# Patient Record
Sex: MALE | Race: WHITE | NOT HISPANIC OR LATINO | URBAN - METROPOLITAN AREA
[De-identification: names, ages, dates, MRNs, and addresses within clinical notes are randomized per-mention and may not be internally consistent; named-entity substitution may affect disease eponyms.]

---

## 2019-04-27 ENCOUNTER — EMERGENCY (EMERGENCY)
Facility: HOSPITAL | Age: 34
LOS: 1 days | Discharge: HOME | End: 2019-04-27
Attending: EMERGENCY MEDICINE
Payer: COMMERCIAL

## 2019-04-27 VITALS
DIASTOLIC BLOOD PRESSURE: 80 MMHG | HEART RATE: 67 BPM | TEMPERATURE: 98 F | OXYGEN SATURATION: 100 % | HEIGHT: 67 IN | WEIGHT: 151.9 LBS | RESPIRATION RATE: 18 BRPM | SYSTOLIC BLOOD PRESSURE: 125 MMHG

## 2019-04-27 LAB
ALBUMIN SERPL ELPH-MCNC: 4.1 G/DL — SIGNIFICANT CHANGE UP (ref 3.5–5)
ALP SERPL-CCNC: 64 U/L — SIGNIFICANT CHANGE UP (ref 40–120)
ALT FLD-CCNC: 27 U/L DA — SIGNIFICANT CHANGE UP (ref 10–60)
ANION GAP SERPL CALC-SCNC: 5 MMOL/L — SIGNIFICANT CHANGE UP (ref 5–17)
APPEARANCE UR: CLEAR — SIGNIFICANT CHANGE UP
APTT BLD: 33.4 SEC — SIGNIFICANT CHANGE UP (ref 27.5–36.3)
AST SERPL-CCNC: 21 U/L — SIGNIFICANT CHANGE UP (ref 10–40)
BASOPHILS # BLD AUTO: 0.08 K/UL — SIGNIFICANT CHANGE UP (ref 0–0.2)
BASOPHILS NFR BLD AUTO: 0.7 % — SIGNIFICANT CHANGE UP (ref 0–2)
BILIRUB SERPL-MCNC: 0.7 MG/DL — SIGNIFICANT CHANGE UP (ref 0.2–1.2)
BILIRUB UR-MCNC: NEGATIVE — SIGNIFICANT CHANGE UP
BLD GP AB SCN SERPL QL: SIGNIFICANT CHANGE UP
BUN SERPL-MCNC: 12 MG/DL — SIGNIFICANT CHANGE UP (ref 7–18)
CALCIUM SERPL-MCNC: 9.1 MG/DL — SIGNIFICANT CHANGE UP (ref 8.4–10.5)
CHLORIDE SERPL-SCNC: 104 MMOL/L — SIGNIFICANT CHANGE UP (ref 96–108)
CO2 SERPL-SCNC: 29 MMOL/L — SIGNIFICANT CHANGE UP (ref 22–31)
COLOR SPEC: YELLOW — SIGNIFICANT CHANGE UP
CREAT SERPL-MCNC: 0.74 MG/DL — SIGNIFICANT CHANGE UP (ref 0.5–1.3)
DIFF PNL FLD: NEGATIVE — SIGNIFICANT CHANGE UP
EOSINOPHIL # BLD AUTO: 0.08 K/UL — SIGNIFICANT CHANGE UP (ref 0–0.5)
EOSINOPHIL NFR BLD AUTO: 0.7 % — SIGNIFICANT CHANGE UP (ref 0–6)
GLUCOSE SERPL-MCNC: 97 MG/DL — SIGNIFICANT CHANGE UP (ref 70–99)
GLUCOSE UR QL: NEGATIVE — SIGNIFICANT CHANGE UP
HCT VFR BLD CALC: 44.3 % — SIGNIFICANT CHANGE UP (ref 39–50)
HGB BLD-MCNC: 15 G/DL — SIGNIFICANT CHANGE UP (ref 13–17)
IMM GRANULOCYTES NFR BLD AUTO: 0.3 % — SIGNIFICANT CHANGE UP (ref 0–1.5)
KETONES UR-MCNC: NEGATIVE — SIGNIFICANT CHANGE UP
LEUKOCYTE ESTERASE UR-ACNC: NEGATIVE — SIGNIFICANT CHANGE UP
LYMPHOCYTES # BLD AUTO: 2.82 K/UL — SIGNIFICANT CHANGE UP (ref 1–3.3)
LYMPHOCYTES # BLD AUTO: 24.1 % — SIGNIFICANT CHANGE UP (ref 13–44)
MCHC RBC-ENTMCNC: 31.4 PG — SIGNIFICANT CHANGE UP (ref 27–34)
MCHC RBC-ENTMCNC: 33.9 GM/DL — SIGNIFICANT CHANGE UP (ref 32–36)
MCV RBC AUTO: 92.9 FL — SIGNIFICANT CHANGE UP (ref 80–100)
MONOCYTES # BLD AUTO: 1.42 K/UL — HIGH (ref 0–0.9)
MONOCYTES NFR BLD AUTO: 12.2 % — SIGNIFICANT CHANGE UP (ref 2–14)
NEUTROPHILS # BLD AUTO: 7.25 K/UL — SIGNIFICANT CHANGE UP (ref 1.8–7.4)
NEUTROPHILS NFR BLD AUTO: 62 % — SIGNIFICANT CHANGE UP (ref 43–77)
NITRITE UR-MCNC: NEGATIVE — SIGNIFICANT CHANGE UP
NRBC # BLD: 0 /100 WBCS — SIGNIFICANT CHANGE UP (ref 0–0)
PH UR: 7 — SIGNIFICANT CHANGE UP (ref 5–8)
PLATELET # BLD AUTO: 269 K/UL — SIGNIFICANT CHANGE UP (ref 150–400)
POTASSIUM SERPL-MCNC: 4.3 MMOL/L — SIGNIFICANT CHANGE UP (ref 3.5–5.3)
POTASSIUM SERPL-SCNC: 4.3 MMOL/L — SIGNIFICANT CHANGE UP (ref 3.5–5.3)
PROT SERPL-MCNC: 7.8 G/DL — SIGNIFICANT CHANGE UP (ref 6–8.3)
PROT UR-MCNC: NEGATIVE — SIGNIFICANT CHANGE UP
RBC # BLD: 4.77 M/UL — SIGNIFICANT CHANGE UP (ref 4.2–5.8)
RBC # FLD: 11.3 % — SIGNIFICANT CHANGE UP (ref 10.3–14.5)
SODIUM SERPL-SCNC: 138 MMOL/L — SIGNIFICANT CHANGE UP (ref 135–145)
SP GR SPEC: 1.01 — SIGNIFICANT CHANGE UP (ref 1.01–1.02)
UROBILINOGEN FLD QL: NEGATIVE — SIGNIFICANT CHANGE UP
WBC # BLD: 11.68 K/UL — HIGH (ref 3.8–10.5)
WBC # FLD AUTO: 11.68 K/UL — HIGH (ref 3.8–10.5)

## 2019-04-27 PROCEDURE — 74177 CT ABD & PELVIS W/CONTRAST: CPT

## 2019-04-27 PROCEDURE — 36415 COLL VENOUS BLD VENIPUNCTURE: CPT

## 2019-04-27 PROCEDURE — 85730 THROMBOPLASTIN TIME PARTIAL: CPT

## 2019-04-27 PROCEDURE — 86901 BLOOD TYPING SEROLOGIC RH(D): CPT

## 2019-04-27 PROCEDURE — 74177 CT ABD & PELVIS W/CONTRAST: CPT | Mod: 26

## 2019-04-27 PROCEDURE — 96374 THER/PROPH/DIAG INJ IV PUSH: CPT | Mod: XU

## 2019-04-27 PROCEDURE — 99285 EMERGENCY DEPT VISIT HI MDM: CPT

## 2019-04-27 PROCEDURE — 85027 COMPLETE CBC AUTOMATED: CPT

## 2019-04-27 PROCEDURE — 81003 URINALYSIS AUTO W/O SCOPE: CPT

## 2019-04-27 PROCEDURE — 86900 BLOOD TYPING SEROLOGIC ABO: CPT

## 2019-04-27 PROCEDURE — 96375 TX/PRO/DX INJ NEW DRUG ADDON: CPT | Mod: XU

## 2019-04-27 PROCEDURE — 80053 COMPREHEN METABOLIC PANEL: CPT

## 2019-04-27 PROCEDURE — 86850 RBC ANTIBODY SCREEN: CPT

## 2019-04-27 PROCEDURE — 99284 EMERGENCY DEPT VISIT MOD MDM: CPT | Mod: 25

## 2019-04-27 RX ORDER — IOHEXOL 300 MG/ML
30 INJECTION, SOLUTION INTRAVENOUS ONCE
Qty: 0 | Refills: 0 | Status: COMPLETED | OUTPATIENT
Start: 2019-04-27 | End: 2019-04-27

## 2019-04-27 RX ORDER — SODIUM CHLORIDE 9 MG/ML
1000 INJECTION INTRAMUSCULAR; INTRAVENOUS; SUBCUTANEOUS ONCE
Qty: 0 | Refills: 0 | Status: COMPLETED | OUTPATIENT
Start: 2019-04-27 | End: 2019-04-27

## 2019-04-27 RX ORDER — PIPERACILLIN AND TAZOBACTAM 4; .5 G/20ML; G/20ML
3.38 INJECTION, POWDER, LYOPHILIZED, FOR SOLUTION INTRAVENOUS ONCE
Qty: 0 | Refills: 0 | Status: COMPLETED | OUTPATIENT
Start: 2019-04-27 | End: 2019-04-27

## 2019-04-27 RX ORDER — MORPHINE SULFATE 50 MG/1
4 CAPSULE, EXTENDED RELEASE ORAL ONCE
Qty: 0 | Refills: 0 | Status: DISCONTINUED | OUTPATIENT
Start: 2019-04-27 | End: 2019-04-27

## 2019-04-27 RX ORDER — CIPROFLOXACIN LACTATE 400MG/40ML
400 VIAL (ML) INTRAVENOUS ONCE
Qty: 0 | Refills: 0 | Status: DISCONTINUED | OUTPATIENT
Start: 2019-04-27 | End: 2019-04-27

## 2019-04-27 RX ORDER — METRONIDAZOLE 500 MG
500 TABLET ORAL ONCE
Qty: 0 | Refills: 0 | Status: DISCONTINUED | OUTPATIENT
Start: 2019-04-27 | End: 2019-04-27

## 2019-04-27 RX ADMIN — SODIUM CHLORIDE 1000 MILLILITER(S): 9 INJECTION INTRAMUSCULAR; INTRAVENOUS; SUBCUTANEOUS at 16:28

## 2019-04-27 RX ADMIN — MORPHINE SULFATE 4 MILLIGRAM(S): 50 CAPSULE, EXTENDED RELEASE ORAL at 18:58

## 2019-04-27 RX ADMIN — PIPERACILLIN AND TAZOBACTAM 200 GRAM(S): 4; .5 INJECTION, POWDER, LYOPHILIZED, FOR SOLUTION INTRAVENOUS at 19:50

## 2019-04-27 RX ADMIN — IOHEXOL 30 MILLILITER(S): 300 INJECTION, SOLUTION INTRAVENOUS at 16:25

## 2019-04-27 RX ADMIN — MORPHINE SULFATE 4 MILLIGRAM(S): 50 CAPSULE, EXTENDED RELEASE ORAL at 16:28

## 2019-04-27 NOTE — ED ADULT NURSE NOTE - OBJECTIVE STATEMENT
pt is here for abdominal pain.  pt stated that right lower abdominal pain since yesterday, denied N/V/D or fever, denied chills, pt calm at this time.

## 2019-04-27 NOTE — ED PROVIDER NOTE - OBJECTIVE STATEMENT
35 y/o M with a PMHx/PSHx of appendectomy (6-7 years ago, with complications) presents to ED c/o RLQ abd pain x 2 days, worsening pain today. States pain is similar to appendicitis. Endorses associated nausea with headache. Denies vomiting/diarrhea. NKDA.

## 2019-04-30 NOTE — ED POST DISCHARGE NOTE - OTHER COMMUNICATION
JULIET PHAM: received call from pt's PCP Dr. Brown, confirm discharge diagnose of diverticulitis because pt was d/c with Augmentin. Charted reviewed, CT shows acute cecal diverticulitis, pt treated with Zosyn, Cipro, Flagyl and d/c with Augmentin. States she'll switch patient to Cipro & Flagyl.

## 2019-09-20 ENCOUNTER — OCCUPATIONAL HEALTH (OUTPATIENT)
Dept: URGENT CARE | Facility: CLINIC | Age: 34
End: 2019-09-20

## 2019-09-20 DIAGNOSIS — Z02.89 ENCOUNTER FOR PHYSICAL EXAMINATION RELATED TO EMPLOYMENT: ICD-10-CM

## 2019-09-20 PROCEDURE — 82075 ASSAY OF BREATH ETHANOL: CPT | Mod: S$GLB,,, | Performed by: EMERGENCY MEDICINE

## 2019-09-20 PROCEDURE — 80305 PR COLLECTION ONLY DRUG SCREEN: ICD-10-PCS | Mod: S$GLB,,, | Performed by: EMERGENCY MEDICINE

## 2019-09-20 PROCEDURE — 80305 DRUG TEST PRSMV DIR OPT OBS: CPT | Mod: S$GLB,,, | Performed by: EMERGENCY MEDICINE

## 2019-09-20 PROCEDURE — 82075 CHG ASSAY OF BREATH ETHANOL: ICD-10-PCS | Mod: S$GLB,,, | Performed by: EMERGENCY MEDICINE

## 2024-05-16 ENCOUNTER — HOSPITAL ENCOUNTER (OUTPATIENT)
Dept: RADIOLOGY | Facility: HOSPITAL | Age: 39
Discharge: HOME | End: 2024-05-16
Attending: NURSE PRACTITIONER
Payer: COMMERCIAL

## 2024-05-16 ENCOUNTER — OFFICE VISIT (OUTPATIENT)
Dept: FAMILY MEDICINE | Facility: CLINIC | Age: 39
End: 2024-05-16
Payer: COMMERCIAL

## 2024-05-16 VITALS
SYSTOLIC BLOOD PRESSURE: 136 MMHG | WEIGHT: 174 LBS | BODY MASS INDEX: 26.37 KG/M2 | DIASTOLIC BLOOD PRESSURE: 88 MMHG | HEIGHT: 68 IN | OXYGEN SATURATION: 98 % | HEART RATE: 94 BPM

## 2024-05-16 DIAGNOSIS — G89.29 CHRONIC MIDLINE THORACIC BACK PAIN: ICD-10-CM

## 2024-05-16 DIAGNOSIS — M54.6 CHRONIC MIDLINE THORACIC BACK PAIN: ICD-10-CM

## 2024-05-16 DIAGNOSIS — Z13.30 ENCOUNTER FOR SCREENING EXAMINATION FOR MENTAL HEALTH AND BEHAVIORAL DISORDERS: ICD-10-CM

## 2024-05-16 DIAGNOSIS — Z11.4 SCREENING FOR HIV (HUMAN IMMUNODEFICIENCY VIRUS): ICD-10-CM

## 2024-05-16 DIAGNOSIS — Z76.89 ENCOUNTER TO ESTABLISH CARE: Primary | ICD-10-CM

## 2024-05-16 DIAGNOSIS — R14.2 BELCHING: ICD-10-CM

## 2024-05-16 DIAGNOSIS — Z13.1 SCREENING FOR DIABETES MELLITUS (DM): ICD-10-CM

## 2024-05-16 DIAGNOSIS — Z13.6 ENCOUNTER FOR LIPID SCREENING FOR CARDIOVASCULAR DISEASE: ICD-10-CM

## 2024-05-16 DIAGNOSIS — Z13.220 ENCOUNTER FOR LIPID SCREENING FOR CARDIOVASCULAR DISEASE: ICD-10-CM

## 2024-05-16 DIAGNOSIS — F41.9 ANXIETY: ICD-10-CM

## 2024-05-16 DIAGNOSIS — Z00.00 HEALTHCARE MAINTENANCE: ICD-10-CM

## 2024-05-16 DIAGNOSIS — K21.9 GASTROESOPHAGEAL REFLUX DISEASE, UNSPECIFIED WHETHER ESOPHAGITIS PRESENT: ICD-10-CM

## 2024-05-16 DIAGNOSIS — Z11.59 NEED FOR HEPATITIS C SCREENING TEST: ICD-10-CM

## 2024-05-16 PROCEDURE — 99204 OFFICE O/P NEW MOD 45 MIN: CPT | Mod: S$GLB,,, | Performed by: NURSE PRACTITIONER

## 2024-05-16 PROCEDURE — 72070 X-RAY EXAM THORAC SPINE 2VWS: CPT | Mod: TC

## 2024-05-16 PROCEDURE — 3075F SYST BP GE 130 - 139MM HG: CPT | Mod: CPTII,S$GLB,, | Performed by: NURSE PRACTITIONER

## 2024-05-16 PROCEDURE — 72070 X-RAY EXAM THORAC SPINE 2VWS: CPT | Mod: 26,,, | Performed by: RADIOLOGY

## 2024-05-16 PROCEDURE — 1160F RVW MEDS BY RX/DR IN RCRD: CPT | Mod: CPTII,S$GLB,, | Performed by: NURSE PRACTITIONER

## 2024-05-16 PROCEDURE — 3079F DIAST BP 80-89 MM HG: CPT | Mod: CPTII,S$GLB,, | Performed by: NURSE PRACTITIONER

## 2024-05-16 PROCEDURE — 99999 PR PBB SHADOW E&M-EST. PATIENT-LVL IV: CPT | Mod: PBBFAC,,, | Performed by: NURSE PRACTITIONER

## 2024-05-16 PROCEDURE — 1159F MED LIST DOCD IN RCRD: CPT | Mod: CPTII,S$GLB,, | Performed by: NURSE PRACTITIONER

## 2024-05-16 PROCEDURE — 3008F BODY MASS INDEX DOCD: CPT | Mod: CPTII,S$GLB,, | Performed by: NURSE PRACTITIONER

## 2024-05-16 RX ORDER — BUSPIRONE HYDROCHLORIDE 7.5 MG/1
7.5 TABLET ORAL 3 TIMES DAILY
Qty: 60 TABLET | Refills: 1 | Status: SHIPPED | OUTPATIENT
Start: 2024-05-16 | End: 2024-05-16

## 2024-05-16 RX ORDER — OMEPRAZOLE 40 MG/1
40 CAPSULE, DELAYED RELEASE ORAL
Qty: 90 CAPSULE | Refills: 1 | Status: SHIPPED | OUTPATIENT
Start: 2024-05-16

## 2024-05-16 RX ORDER — BUSPIRONE HYDROCHLORIDE 7.5 MG/1
7.5 TABLET ORAL 2 TIMES DAILY
Qty: 60 TABLET | Refills: 1 | Status: SHIPPED | OUTPATIENT
Start: 2024-05-16

## 2024-05-16 NOTE — PROGRESS NOTES
"    SUBJECTIVE:      Patient ID: Yovani Melvin is a 39 y.o. male.    Chief Complaint: Establish Care    39-year-old male presents to the clinic as a new patient to establish care. He has no significant past medical history and does not take any prescription medications. He is a nonsmoker. Denies alcohol and illicit drug use. He has multiple complaints today that he would like addressed.    *GERD - reports heartburn and belching for years. Gas Ex helps. Multiple otc meds have not helped, including some PPIs. He belches even when he does not drink soda or carbonated drinks. Tries to limits spicy foods. Tea triggers the heartburn. Mostly drinking water and lemonade.     *Anxiety - reports his anxiety has been elevated. He reports holding in his anxiety, until he eventually takes it out on his family. Feels it is stress related. Denies panic attacks. Denies depression.     *Back pain - report mid thoracic back pain. Symptoms stared 3 months ago. The pain is located in one spot. Bending the right way makes pain worse. The area is tender to palpation. Reports falling off a horse years ago. Report hx of spinal fx, which was found incidentally on imaging. CT scan showed chronic left L5 pars defect. Prior imaging in Epic showed no known fractures.     He would like to be tested to see if he is on the spectrum or has Asperger's. Reports often has difficulty talking if called upon at random or if he is put on the spot. Wears headphones at home 80% of the time to drown out the background nose. Sometimes has difficulty socializing. He also has "trigger's" throughout his body. Reports family hx of some relatives on the spectrum.         Family History   Problem Relation Name Age of Onset    Migraines Mother      Hypertension Mother      Diabetes Father      Diabetes Paternal Aunt      Sarcoidosis Paternal Aunt      Diabetes Paternal Grandfather      Emphysema Maternal Grandfather      COPD Maternal Grandfather        Social History "     Socioeconomic History    Marital status:    Tobacco Use    Smoking status: Never    Smokeless tobacco: Never   Substance and Sexual Activity    Alcohol use: Never    Drug use: Never     Social Determinants of Health     Financial Resource Strain: High Risk (5/15/2024)    Overall Financial Resource Strain (CARDIA)     Difficulty of Paying Living Expenses: Hard   Food Insecurity: Patient Declined (5/15/2024)    Hunger Vital Sign     Worried About Running Out of Food in the Last Year: Patient declined     Ran Out of Food in the Last Year: Patient declined   Transportation Needs: No Transportation Needs (5/15/2024)    PRAPARE - Transportation     Lack of Transportation (Medical): No     Lack of Transportation (Non-Medical): No   Physical Activity: Insufficiently Active (5/15/2024)    Exercise Vital Sign     Days of Exercise per Week: 3 days     Minutes of Exercise per Session: 20 min   Stress: Stress Concern Present (5/15/2024)    [employer] [place] [employer] - Occupational Stress Questionnaire     Feeling of Stress : To some extent   Housing Stability: Unknown (5/15/2024)    Housing Stability Vital Sign     Unable to Pay for Housing in the Last Year: No     Current Outpatient Medications   Medication Sig Dispense Refill    busPIRone (BUSPAR) 7.5 MG tablet Take 1 tablet (7.5 mg total) by mouth 3 (three) times daily. 60 tablet 1    omeprazole (PRILOSEC) 40 MG capsule Take 1 capsule (40 mg total) by mouth before breakfast. 90 capsule 1     No current facility-administered medications for this visit.     Review of patient's allergies indicates:   Allergen Reactions    Aspirin Palpitations and Shortness Of Breath    Hydrocodone     Penicillins       Past Medical History:   Diagnosis Date    Nephrolithiasis      History reviewed. No pertinent surgical history.    Review of Systems   Constitutional:  Negative for activity change, appetite change, chills, diaphoresis, fatigue, fever and unexpected weight  "change.   HENT:  Negative for hearing loss, rhinorrhea and trouble swallowing.    Eyes:  Negative for discharge and visual disturbance.   Respiratory:  Negative for chest tightness, shortness of breath and wheezing.    Cardiovascular:  Negative for chest pain and palpitations.   Gastrointestinal:  Negative for blood in stool, constipation, diarrhea and vomiting.   Endocrine: Negative for polydipsia and polyuria.   Genitourinary:  Negative for difficulty urinating, hematuria and urgency.   Musculoskeletal:  Positive for back pain. Negative for arthralgias, joint swelling and neck pain.   Neurological:  Negative for weakness and headaches.   Psychiatric/Behavioral:  Negative for confusion and dysphoric mood. The patient is nervous/anxious.       OBJECTIVE:      Vitals:    05/16/24 1031   BP: 136/88   BP Location: Left arm   Patient Position: Sitting   BP Method: Medium (Manual)   Pulse: 94   SpO2: 98%   Weight: 78.9 kg (174 lb)   Height: 5' 8" (1.727 m)     Physical Exam  Vitals and nursing note reviewed.   Constitutional:       General: He is awake. He is not in acute distress.     Appearance: Normal appearance. He is overweight. He is not ill-appearing, toxic-appearing or diaphoretic.   HENT:      Head: Normocephalic and atraumatic.      Right Ear: Tympanic membrane, ear canal and external ear normal.      Left Ear: Tympanic membrane, ear canal and external ear normal.      Nose: Nose normal.      Mouth/Throat:      Lips: Pink.      Mouth: Mucous membranes are moist.      Dentition: Normal dentition. No dental tenderness.      Tongue: Tongue does not deviate from midline.      Pharynx: Oropharynx is clear. No pharyngeal swelling, oropharyngeal exudate, posterior oropharyngeal erythema or uvula swelling.   Eyes:      General: Lids are normal. Gaze aligned appropriately.      Conjunctiva/sclera: Conjunctivae normal.      Right eye: Right conjunctiva is not injected.      Left eye: Left conjunctiva is not injected.     "  Pupils: Pupils are equal, round, and reactive to light.   Cardiovascular:      Rate and Rhythm: Normal rate and regular rhythm.      Pulses: Normal pulses.      Heart sounds: Normal heart sounds, S1 normal and S2 normal. No murmur heard.     No friction rub. No gallop.   Pulmonary:      Effort: Pulmonary effort is normal. No respiratory distress.      Breath sounds: Normal breath sounds. No stridor. No decreased breath sounds, wheezing, rhonchi or rales.   Chest:      Chest wall: No tenderness.   Musculoskeletal:      Cervical back: Neck supple.      Right lower leg: No edema.      Left lower leg: No edema.   Lymphadenopathy:      Cervical: No cervical adenopathy.   Skin:     General: Skin is warm and dry.      Capillary Refill: Capillary refill takes less than 2 seconds.      Findings: No erythema or rash.   Neurological:      Mental Status: He is alert and oriented to person, place, and time. Mental status is at baseline.   Psychiatric:         Attention and Perception: Attention normal.         Mood and Affect: Mood normal.         Speech: Speech normal.         Behavior: Behavior normal. Behavior is cooperative.         Thought Content: Thought content normal.         Judgment: Judgment normal.        Assessment:       1. Encounter to establish care    2. Gastroesophageal reflux disease, unspecified whether esophagitis present    3. Belching    4. Anxiety    5. Chronic midline thoracic back pain    6. Encounter for screening examination for mental health and behavioral disorders    7. Healthcare maintenance    8. Encounter for lipid screening for cardiovascular disease    9. Screening for HIV (human immunodeficiency virus)    10. Need for hepatitis C screening test    11. Screening for diabetes mellitus (DM)        Plan:       Encounter to establish care  New patient.  Will start getting his routine care and overdue health maintenance completed.  Advised patient about age and season appropriate immunizations/  cancer screenings. Influenza yearly and Tdap vaccine ever 10 years.  Age appropriate screenings ordered.    Gastroesophageal reflux disease, unspecified whether esophagitis present  Will check for H. Pylori with breast test. Patient to start Omeprazole. Will reevaluate in 1 month. If no improvement will send patient to GI for EGD. Limit acidic/spicy foods.   -     Helicobacter Pylori Urea Breath Test; Future; Expected date: 05/16/2024  -     omeprazole (PRILOSEC) 40 MG capsule; Take 1 capsule (40 mg total) by mouth before breakfast.  Dispense: 90 capsule; Refill: 1    Belching  H. Pylori test pending.  -     Helicobacter Pylori Urea Breath Test; Future; Expected date: 05/16/2024    Anxiety  Trial of Buspar. Take as prescribed. Follow-up in 1 month for further eval.  -     busPIRone (BUSPAR) 7.5 MG tablet; Take 1 tablet (7.5 mg total) by mouth 2 (two) times daily.  Dispense: 60 tablet; Refill: 1    Chronic midline thoracic back pain  X-ray pending. NSAIDs prn pain.   -     X-Ray Thoracic Spine AP Lateral; Future; Expected date: 05/16/2024    Encounter for screening examination for mental health and behavioral disorders  Patient needs further eval and testing by psychiatry if he wants to know if he has Asperger's or other syndromes. He is high functioning if testing turns out to be positive. Patient to contact a psychiatrist of his choice and I will place the referral.     Healthcare maintenance  -     Comprehensive Metabolic Panel; Future; Expected date: 05/16/2024  -     Lipid Panel; Future; Expected date: 05/16/2024  -     TSH; Future; Expected date: 05/16/2024  -     Hepatitis C Antibody; Future; Expected date: 05/16/2024  -     HIV 1/2 Ag/Ab (4th Gen); Future; Expected date: 05/16/2024  -     Hemoglobin A1C; Future; Expected date: 05/16/2024    Encounter for lipid screening for cardiovascular disease  -     Lipid Panel; Future; Expected date: 05/16/2024    Screening for HIV (human immunodeficiency virus)  -      HIV 1/2 Ag/Ab (4th Gen); Future; Expected date: 05/16/2024    Need for hepatitis C screening test  -     Hepatitis C Antibody; Future; Expected date: 05/16/2024    Screening for diabetes mellitus (DM)  -     Comprehensive Metabolic Panel; Future; Expected date: 05/16/2024  -     Hemoglobin A1C; Future; Expected date: 05/16/2024    This note was created using [employer] voice recognition software that occasionally misinterprets phrases or words.     I spent a total of 47 minutes on the day of the visit.This includes face to face time and non-face to face time preparing to see the patient (eg, review of tests), obtaining and/or reviewing separately obtained history, documenting clinical information in the electronic or other health record, independently interpreting results and communicating results to the patient/family/caregiver, or care coordinator.    Follow up in about 1 month (around 6/16/2024) for Anxiety and GERD.          5/16/2024 JASON Rodas, FNP

## 2024-05-17 ENCOUNTER — PATIENT MESSAGE (OUTPATIENT)
Dept: FAMILY MEDICINE | Facility: CLINIC | Age: 39
End: 2024-05-17
Payer: COMMERCIAL

## 2024-05-17 NOTE — TELEPHONE ENCOUNTER
Patient message    Okay thanks! Ill let the pharmacy know as well. So is it the whole pill twice daily or just the half? Sorry for all the questions just got all confused with the two prescriptions getting jumbled up.

## 2024-05-18 LAB
ALBUMIN SERPL-MCNC: 4.8 G/DL (ref 3.6–5.1)
ALBUMIN/GLOB SERPL: 1.9 (CALC) (ref 1–2.5)
ALP SERPL-CCNC: 70 U/L (ref 36–130)
ALT SERPL-CCNC: 26 U/L (ref 9–46)
AST SERPL-CCNC: 19 U/L (ref 10–40)
BILIRUB SERPL-MCNC: 0.7 MG/DL (ref 0.2–1.2)
BUN SERPL-MCNC: 11 MG/DL (ref 7–25)
BUN/CREAT SERPL: NORMAL (CALC) (ref 6–22)
CALCIUM SERPL-MCNC: 9.9 MG/DL (ref 8.6–10.3)
CHLORIDE SERPL-SCNC: 104 MMOL/L (ref 98–110)
CHOLEST SERPL-MCNC: 228 MG/DL
CHOLEST/HDLC SERPL: 6.5 (CALC)
CO2 SERPL-SCNC: 27 MMOL/L (ref 20–32)
CREAT SERPL-MCNC: 1.1 MG/DL (ref 0.6–1.26)
EGFR: 89 ML/MIN/1.73M2
GLOBULIN SER CALC-MCNC: 2.5 G/DL (CALC) (ref 1.9–3.7)
GLUCOSE SERPL-MCNC: 87 MG/DL (ref 65–99)
HBA1C MFR BLD: 5.6 % OF TOTAL HGB
HCV AB SERPL QL IA: NORMAL
HDLC SERPL-MCNC: 35 MG/DL
HIV 1+2 AB+HIV1 P24 AG SERPL QL IA: NORMAL
LDLC SERPL CALC-MCNC: 163 MG/DL (CALC)
NONHDLC SERPL-MCNC: 193 MG/DL (CALC)
POTASSIUM SERPL-SCNC: 5.1 MMOL/L (ref 3.5–5.3)
PROT SERPL-MCNC: 7.3 G/DL (ref 6.1–8.1)
SODIUM SERPL-SCNC: 142 MMOL/L (ref 135–146)
TRIGL SERPL-MCNC: 152 MG/DL
TSH SERPL-ACNC: 2.71 MIU/L (ref 0.4–4.5)
UREA BREATH TEST QL: NOT DETECTED

## 2024-05-21 ENCOUNTER — TELEPHONE (OUTPATIENT)
Dept: FAMILY MEDICINE | Facility: CLINIC | Age: 39
End: 2024-05-21
Payer: COMMERCIAL

## 2024-05-21 NOTE — TELEPHONE ENCOUNTER
Called and spoke to patient's wife and advise for patient to discontinue the medication. Verbal understanding was expressed.

## 2024-05-21 NOTE — TELEPHONE ENCOUNTER
Patient's spouse called stating the patient is having severe side effects from the Buspar. She stated that he is having numbness in his hands, arms and legs, severe dizziness and it has put him in a panic attack. She stated that he is currently trying to calm himself down.  The patient has started the medication on Friday and has taken the medication today.   Please advise

## 2024-06-02 ENCOUNTER — HOSPITAL ENCOUNTER (EMERGENCY)
Facility: HOSPITAL | Age: 39
Discharge: HOME | End: 2024-06-02
Attending: EMERGENCY MEDICINE
Payer: COMMERCIAL

## 2024-06-02 VITALS
DIASTOLIC BLOOD PRESSURE: 86 MMHG | TEMPERATURE: 99 F | OXYGEN SATURATION: 96 % | SYSTOLIC BLOOD PRESSURE: 133 MMHG | HEIGHT: 68 IN | HEART RATE: 78 BPM | WEIGHT: 170 LBS | RESPIRATION RATE: 18 BRPM | BODY MASS INDEX: 25.76 KG/M2

## 2024-06-02 DIAGNOSIS — N20.0 KIDNEY STONE: Primary | ICD-10-CM

## 2024-06-02 LAB
ALBUMIN SERPL BCP-MCNC: 4.5 G/DL (ref 3.5–5.2)
ALP SERPL-CCNC: 60 U/L (ref 55–135)
ALT SERPL W/O P-5'-P-CCNC: 30 U/L (ref 10–44)
ANION GAP SERPL CALC-SCNC: 13 MMOL/L (ref 8–16)
AST SERPL-CCNC: 21 U/L (ref 10–40)
BACTERIA #/AREA URNS HPF: ABNORMAL /HPF
BASOPHILS # BLD AUTO: 0.05 K/UL (ref 0–0.2)
BASOPHILS NFR BLD: 0.7 % (ref 0–1.9)
BILIRUB SERPL-MCNC: 0.6 MG/DL (ref 0.1–1)
BILIRUB UR QL STRIP: NEGATIVE
BUN SERPL-MCNC: 11 MG/DL (ref 6–20)
CALCIUM SERPL-MCNC: 9.6 MG/DL (ref 8.7–10.5)
CHLORIDE SERPL-SCNC: 108 MMOL/L (ref 95–110)
CLARITY UR: CLEAR
CO2 SERPL-SCNC: 21 MMOL/L (ref 23–29)
COLOR UR: YELLOW
CREAT SERPL-MCNC: 1.3 MG/DL (ref 0.5–1.4)
DIFFERENTIAL METHOD BLD: ABNORMAL
EOSINOPHIL # BLD AUTO: 0.1 K/UL (ref 0–0.5)
EOSINOPHIL NFR BLD: 0.7 % (ref 0–8)
ERYTHROCYTE [DISTWIDTH] IN BLOOD BY AUTOMATED COUNT: 12.2 % (ref 11.5–14.5)
EST. GFR  (NO RACE VARIABLE): >60 ML/MIN/1.73 M^2
GLUCOSE SERPL-MCNC: 148 MG/DL (ref 70–110)
GLUCOSE UR QL STRIP: NEGATIVE
HCT VFR BLD AUTO: 44.3 % (ref 40–54)
HGB BLD-MCNC: 15.3 G/DL (ref 14–18)
HGB UR QL STRIP: ABNORMAL
IMM GRANULOCYTES # BLD AUTO: 0.01 K/UL (ref 0–0.04)
IMM GRANULOCYTES NFR BLD AUTO: 0.1 % (ref 0–0.5)
KETONES UR QL STRIP: ABNORMAL
LEUKOCYTE ESTERASE UR QL STRIP: NEGATIVE
LYMPHOCYTES # BLD AUTO: 0.8 K/UL (ref 1–4.8)
LYMPHOCYTES NFR BLD: 11.1 % (ref 18–48)
MCH RBC QN AUTO: 30.5 PG (ref 27–31)
MCHC RBC AUTO-ENTMCNC: 34.5 G/DL (ref 32–36)
MCV RBC AUTO: 88 FL (ref 82–98)
MICROSCOPIC COMMENT: ABNORMAL
MONOCYTES # BLD AUTO: 0.4 K/UL (ref 0.3–1)
MONOCYTES NFR BLD: 5.1 % (ref 4–15)
NEUTROPHILS # BLD AUTO: 5.7 K/UL (ref 1.8–7.7)
NEUTROPHILS NFR BLD: 82.3 % (ref 38–73)
NITRITE UR QL STRIP: NEGATIVE
NRBC BLD-RTO: 0 /100 WBC
PH UR STRIP: 6 [PH] (ref 5–8)
PLATELET # BLD AUTO: 238 K/UL (ref 150–450)
PMV BLD AUTO: 9.3 FL (ref 9.2–12.9)
POTASSIUM SERPL-SCNC: 3.9 MMOL/L (ref 3.5–5.1)
PROT SERPL-MCNC: 7.3 G/DL (ref 6–8.4)
PROT UR QL STRIP: ABNORMAL
RBC # BLD AUTO: 5.02 M/UL (ref 4.6–6.2)
RBC #/AREA URNS HPF: 18 /HPF (ref 0–4)
SODIUM SERPL-SCNC: 142 MMOL/L (ref 136–145)
SP GR UR STRIP: 1.02 (ref 1–1.03)
SQUAMOUS #/AREA URNS HPF: 1 /HPF
URN SPEC COLLECT METH UR: ABNORMAL
UROBILINOGEN UR STRIP-ACNC: NEGATIVE EU/DL
WBC # BLD AUTO: 6.93 K/UL (ref 3.9–12.7)
WBC #/AREA URNS HPF: 3 /HPF (ref 0–5)

## 2024-06-02 PROCEDURE — 25000003 PHARM REV CODE 250: Performed by: EMERGENCY MEDICINE

## 2024-06-02 PROCEDURE — 99285 EMERGENCY DEPT VISIT HI MDM: CPT | Mod: 25

## 2024-06-02 PROCEDURE — 85025 COMPLETE CBC W/AUTO DIFF WBC: CPT | Performed by: EMERGENCY MEDICINE

## 2024-06-02 PROCEDURE — 96361 HYDRATE IV INFUSION ADD-ON: CPT

## 2024-06-02 PROCEDURE — 36415 COLL VENOUS BLD VENIPUNCTURE: CPT | Performed by: EMERGENCY MEDICINE

## 2024-06-02 PROCEDURE — 80053 COMPREHEN METABOLIC PANEL: CPT | Performed by: EMERGENCY MEDICINE

## 2024-06-02 PROCEDURE — 81000 URINALYSIS NONAUTO W/SCOPE: CPT | Performed by: EMERGENCY MEDICINE

## 2024-06-02 PROCEDURE — 96374 THER/PROPH/DIAG INJ IV PUSH: CPT

## 2024-06-02 PROCEDURE — 96375 TX/PRO/DX INJ NEW DRUG ADDON: CPT

## 2024-06-02 PROCEDURE — 63600175 PHARM REV CODE 636 W HCPCS: Performed by: EMERGENCY MEDICINE

## 2024-06-02 RX ORDER — TAMSULOSIN HYDROCHLORIDE 0.4 MG/1
0.4 CAPSULE ORAL DAILY
Qty: 30 CAPSULE | Refills: 0 | Status: SHIPPED | OUTPATIENT
Start: 2024-06-02 | End: 2024-06-05

## 2024-06-02 RX ORDER — KETOROLAC TROMETHAMINE 30 MG/ML
15 INJECTION, SOLUTION INTRAMUSCULAR; INTRAVENOUS
Status: COMPLETED | OUTPATIENT
Start: 2024-06-02 | End: 2024-06-02

## 2024-06-02 RX ORDER — ONDANSETRON HYDROCHLORIDE 2 MG/ML
4 INJECTION, SOLUTION INTRAVENOUS
Status: COMPLETED | OUTPATIENT
Start: 2024-06-02 | End: 2024-06-02

## 2024-06-02 RX ORDER — TAMSULOSIN HYDROCHLORIDE 0.4 MG/1
0.4 CAPSULE ORAL
Status: COMPLETED | OUTPATIENT
Start: 2024-06-02 | End: 2024-06-02

## 2024-06-02 RX ORDER — TRAMADOL HYDROCHLORIDE 50 MG/1
50 TABLET ORAL EVERY 6 HOURS PRN
Qty: 10 TABLET | Refills: 0 | Status: SHIPPED | OUTPATIENT
Start: 2024-06-02

## 2024-06-02 RX ORDER — DICLOFENAC SODIUM 50 MG/1
50 TABLET, DELAYED RELEASE ORAL 3 TIMES DAILY PRN
Qty: 15 TABLET | Refills: 0 | Status: SHIPPED | OUTPATIENT
Start: 2024-06-02 | End: 2024-06-05

## 2024-06-02 RX ORDER — ONDANSETRON 4 MG/1
4 TABLET, ORALLY DISINTEGRATING ORAL EVERY 8 HOURS PRN
Qty: 12 TABLET | Refills: 0 | Status: SHIPPED | OUTPATIENT
Start: 2024-06-02

## 2024-06-02 RX ADMIN — SODIUM CHLORIDE 1000 ML: 9 INJECTION, SOLUTION INTRAVENOUS at 08:06

## 2024-06-02 RX ADMIN — KETOROLAC TROMETHAMINE 15 MG: 30 INJECTION, SOLUTION INTRAMUSCULAR at 08:06

## 2024-06-02 RX ADMIN — TAMSULOSIN HYDROCHLORIDE 0.4 MG: 0.4 CAPSULE ORAL at 10:06

## 2024-06-02 RX ADMIN — ONDANSETRON 4 MG: 2 INJECTION INTRAMUSCULAR; INTRAVENOUS at 08:06

## 2024-06-02 NOTE — ED PROVIDER NOTES
Encounter Date: 6/2/2024       History     Chief Complaint   Patient presents with    Flank Pain     39-year-old male with a past medical history of kidney stones presents for evaluation of left-sided flank pain.  The patient reports he actually started with left sided abdominal pain yesterday that has now spread to the left flank and left posterior back this morning.  He reports some associated nausea.  He denies any dysuria, hematuria, fever, cough, or congestion.  He reports that he did take over-the-counter ibuprofen and Zofran for his symptoms this morning, without improvement in them.  There are no alleviating or aggravating factors.      Review of patient's allergies indicates:   Allergen Reactions    Aspirin Palpitations and Shortness Of Breath    Hydrocodone     Penicillins      Past Medical History:   Diagnosis Date    Nephrolithiasis      No past surgical history on file.  Family History   Problem Relation Name Age of Onset    Migraines Mother      Hypertension Mother      Diabetes Father      Diabetes Paternal Aunt      Sarcoidosis Paternal Aunt      Diabetes Paternal Grandfather      Emphysema Maternal Grandfather      COPD Maternal Grandfather       Social History     Tobacco Use    Smoking status: Never    Smokeless tobacco: Never   Substance Use Topics    Alcohol use: Never    Drug use: Never     Review of Systems   Constitutional:  Negative for chills, diaphoresis, fatigue and fever.   HENT:  Negative for congestion and rhinorrhea.    Respiratory:  Negative for cough and shortness of breath.    Cardiovascular:  Negative for chest pain.   Gastrointestinal:  Positive for nausea. Negative for abdominal pain, diarrhea and vomiting.   Genitourinary:  Positive for flank pain. Negative for dysuria, frequency and testicular pain.   Musculoskeletal:  Negative for gait problem.   Skin:  Negative for color change.   Neurological:  Negative for dizziness and numbness.   Psychiatric/Behavioral:  Negative for  agitation and confusion.        Physical Exam     Initial Vitals [06/02/24 0746]   BP Pulse Resp Temp SpO2   120/82 63 18 97.9 °F (36.6 °C) 97 %      MAP       --         Physical Exam    Nursing note and vitals reviewed.  Constitutional: He appears well-developed.   Pale appearing   HENT:   Head: Normocephalic and atraumatic.   Eyes: EOM are normal. Pupils are equal, round, and reactive to light.   Neck: Neck supple.   Cardiovascular:  Normal rate and regular rhythm.           Pulmonary/Chest: Breath sounds normal.   Abdominal: Abdomen is soft. Bowel sounds are normal. He exhibits no distension. There is no abdominal tenderness. There is no rebound.   Musculoskeletal:         General: Normal range of motion.      Cervical back: Neck supple.     Neurological: He is alert and oriented to person, place, and time.   Skin: Skin is warm and dry.   Psychiatric: He has a normal mood and affect.         ED Course   Procedures  Labs Reviewed   CBC W/ AUTO DIFFERENTIAL - Abnormal; Notable for the following components:       Result Value    Lymph # 0.8 (*)     Gran % 82.3 (*)     Lymph % 11.1 (*)     All other components within normal limits   COMPREHENSIVE METABOLIC PANEL - Abnormal; Notable for the following components:    CO2 21 (*)     Glucose 148 (*)     All other components within normal limits   URINALYSIS, REFLEX TO URINE CULTURE - Abnormal; Notable for the following components:    Protein, UA Trace (*)     Ketones, UA 1+ (*)     Occult Blood UA 3+ (*)     All other components within normal limits    Narrative:     Specimen Source->Urine   URINALYSIS MICROSCOPIC - Abnormal; Notable for the following components:    RBC, UA 18 (*)     Bacteria Few (*)     All other components within normal limits    Narrative:     Specimen Source->Urine          Imaging Results              CT Renal Stone Study ABD Pelvis WO (Final result)  Result time 06/02/24 09:32:18      Final result by Michael Mckeon MD (06/02/24 09:32:18)                    Impression:      1. Mild obstructive uropathy on the left with a 5 mm proximal ureterolith.  2. Additional right nephrolith.  3. Mild splenomegaly.      Electronically signed by: Michael Mckeon  Date:    06/02/2024  Time:    09:32               Narrative:    EXAMINATION:  CT RENAL STONE STUDY ABD PELVIS WO    CLINICAL HISTORY:  Flank pain, kidney stone suspected;    TECHNIQUE:  Low dose axial images, sagittal and coronal reformations were obtained from the lung bases to the pubic symphysis.  Contrast was not administered.    COMPARISON:  06/19/2019    FINDINGS:  Minimal atelectasis in the lung bases.  Normal size heart.  Unremarkable gallbladder.    2 mm right renal stone.  There is hydroureteronephrosis on the left with a 5 mm proximal ureteral stone.  Spleen is 14 cm in length.  Remaining solid abdominal organs are grossly unremarkable on this noncontrast exam.    There is no enteric contrast which limits bowel assessment.  No dilated bowel loops.    Small fat containing umbilical defect.  Under distension presumably accounts for urinary bladder wall thickening.  Unremarkable prostate noncontrast appearance.    Unilateral left L5 pars defect.  Slight dextrocurvature lumbar spine.                                       Medications   sodium chloride 0.9% bolus 1,000 mL 1,000 mL (0 mLs Intravenous Stopped 6/2/24 0934)   ondansetron injection 4 mg (4 mg Intravenous Given 6/2/24 0830)   ketorolac injection 15 mg (15 mg Intravenous Given 6/2/24 0830)   tamsulosin 24 hr capsule 0.4 mg (0.4 mg Oral Given 6/2/24 1043)     Medical Decision Making  39-year-old male presented for flank pain.    Initial differential diagnosis included but not limited to nephrolithiasis, pyelonephritis, and diverticulitis.    Amount and/or Complexity of Data Reviewed  Labs: ordered.  Radiology: ordered.    Risk  Prescription drug management.  Risk Details: The patient was emergently evaluated in the emergency department, his  evaluation was significant for a middle-age male who is pale appearing.  The patient was treated with IV fluids, IV Toradol, and IV Zofran here in the emergency department.  The patient reports improvement in his symptoms after treatment.  The patient's labs were significant for red blood cells in his urine without evidence of infection.  The patient's CT scan does show a kidney stone coming down on the left side, this is likely the etiology of his symptoms.  The patient was also started on p.o. Flomax here in the emergency department.  He is stable for discharge to home and does not require further care or workup at this time.  He will be discharged home with p.o. Flomax, p.o. Ultram, p.o. diclofenac, and ODT Zofran.  He is referred to Urology for follow-up.                                      Clinical Impression:  Final diagnoses:  [N20.0] Kidney stone (Primary)          ED Disposition Condition    Discharge Stable          ED Prescriptions       Medication Sig Dispense Start Date End Date Auth. Provider    tamsulosin (FLOMAX) 0.4 mg Cap Take 1 capsule (0.4 mg total) by mouth once daily. 30 capsule 6/2/2024 7/2/2024 Cassius Duenas MD    diclofenac (VOLTAREN) 50 MG EC tablet Take 1 tablet (50 mg total) by mouth 3 (three) times daily as needed (pain). 15 tablet 6/2/2024 -- Cassius Duenas MD    ondansetron (ZOFRAN-ODT) 4 MG TbDL Take 1 tablet (4 mg total) by mouth every 8 (eight) hours as needed (nausea/vomiting). 12 tablet 6/2/2024 -- Cassius Duenas MD    traMADoL (ULTRAM) 50 mg tablet Take 1 tablet (50 mg total) by mouth every 6 (six) hours as needed for Pain. 10 tablet 6/2/2024 -- Cassius Duenas MD          Follow-up Information       Follow up With Specialties Details Why Contact Info    Primo Chappell MD Urology Schedule an appointment as soon as possible for a visit   [address] [place] [address] [address]ANELIS CURRAN [id number]  [phone]               Cassius Duenas MD  06/02/24  [id number]

## 2024-06-05 ENCOUNTER — OFFICE VISIT (OUTPATIENT)
Dept: UROLOGY | Facility: CLINIC | Age: 39
End: 2024-06-05
Payer: COMMERCIAL

## 2024-06-05 VITALS
SYSTOLIC BLOOD PRESSURE: 133 MMHG | HEART RATE: 78 BPM | BODY MASS INDEX: 26.19 KG/M2 | HEIGHT: 68 IN | DIASTOLIC BLOOD PRESSURE: 86 MMHG | WEIGHT: 172.81 LBS

## 2024-06-05 DIAGNOSIS — N20.0 KIDNEY STONE: ICD-10-CM

## 2024-06-05 DIAGNOSIS — N20.1 URETERAL STONE: Primary | ICD-10-CM

## 2024-06-05 DIAGNOSIS — N22 CALCULUS OF URINARY TRACT IN DISEASES CLASSIFIED ELSEWHERE: ICD-10-CM

## 2024-06-05 LAB
BILIRUBIN, UA POC OHS: NEGATIVE
BLOOD, UA POC OHS: ABNORMAL
CLARITY, UA POC OHS: CLEAR
COLOR, UA POC OHS: YELLOW
GLUCOSE, UA POC OHS: NEGATIVE
KETONES, UA POC OHS: NEGATIVE
LEUKOCYTES, UA POC OHS: NEGATIVE
NITRITE, UA POC OHS: NEGATIVE
PH, UA POC OHS: 6
PROTEIN, UA POC OHS: NEGATIVE
SPECIFIC GRAVITY, UA POC OHS: <=1.005
UROBILINOGEN, UA POC OHS: 0.2

## 2024-06-05 PROCEDURE — 3075F SYST BP GE 130 - 139MM HG: CPT | Mod: CPTII,S$GLB,, | Performed by: NURSE PRACTITIONER

## 2024-06-05 PROCEDURE — 99204 OFFICE O/P NEW MOD 45 MIN: CPT | Mod: S$GLB,,, | Performed by: NURSE PRACTITIONER

## 2024-06-05 PROCEDURE — 1160F RVW MEDS BY RX/DR IN RCRD: CPT | Mod: CPTII,S$GLB,, | Performed by: NURSE PRACTITIONER

## 2024-06-05 PROCEDURE — 3079F DIAST BP 80-89 MM HG: CPT | Mod: CPTII,S$GLB,, | Performed by: NURSE PRACTITIONER

## 2024-06-05 PROCEDURE — 81003 URINALYSIS AUTO W/O SCOPE: CPT | Mod: QW,S$GLB,, | Performed by: NURSE PRACTITIONER

## 2024-06-05 PROCEDURE — 99999 PR PBB SHADOW E&M-EST. PATIENT-LVL IV: CPT | Mod: PBBFAC,,, | Performed by: NURSE PRACTITIONER

## 2024-06-05 PROCEDURE — 3044F HG A1C LEVEL LT 7.0%: CPT | Mod: CPTII,S$GLB,, | Performed by: NURSE PRACTITIONER

## 2024-06-05 PROCEDURE — 3008F BODY MASS INDEX DOCD: CPT | Mod: CPTII,S$GLB,, | Performed by: NURSE PRACTITIONER

## 2024-06-05 PROCEDURE — 82365 CALCULUS SPECTROSCOPY: CPT | Performed by: NURSE PRACTITIONER

## 2024-06-05 PROCEDURE — 1159F MED LIST DOCD IN RCRD: CPT | Mod: CPTII,S$GLB,, | Performed by: NURSE PRACTITIONER

## 2024-06-05 NOTE — H&P (VIEW-ONLY)
"[facility] Note  Staff: MARCUS Middleton    PCP: MARCUS Fountain    Chief Complaint: ER F/UP-Ureteral Stone    Subjective:        HPI: Yovani Melvin is a 39 y.o. male NEW PT presents today for evaluation of ureteral stone.  Was evaluated in ER few days ago.    ER Encounter on 6/2/24 (3 days ago)  39-year-old male with a past medical history of kidney stones presents for evaluation of left-sided flank pain. The patient reports he actually started with left sided abdominal pain yesterday that has now spread to the left flank and left posterior back this morning. He reports some associated nausea. He denies any dysuria, hematuria, fever, cough, or congestion. He reports that he did take over-the-counter ibuprofen and Zofran for his symptoms this morning, without improvement in them. There are no alleviating or aggravating factors.     BUN/Cr on 6/2/24 is 11/1.3  GFR of >60    CT RSS done during ER Evaluation:  IMPRESSION:  1. Mild obstructive uropathy on the left with a 5 mm proximal ureterolith.  2. Additional right nephrolith.  3. Mild splenomegaly.    OV 6/5/24:  UA in office showed trace-intact blood, otherwise WNL  No gross hematuria  Pain has improved as of today's ov.  He only took "3" doses of the Flomax, due to side affect of dizziness with med.  Pt brings in an old stone today to be analyzed at this time.    REVIEW OF SYSTEMS:  A comprehensive 10 system review was performed and is negative except as noted above in HPI    PMHx:  Past Medical History:   Diagnosis Date    Nephrolithiasis      PSHx:  History reviewed. No pertinent surgical history.    Allergies:  Aspirin, Hydrocodone, and Penicillins    Medications: reviewed     Objective:     Vitals:    06/05/24 1020   BP: 133/86   Pulse: 78     General:WDWN in NAD  Eyes: PERRLA, normal conjunctiva  Respiratory: no increased work on breathing, clear to auscultation  Cardiovascular: regular rate and rhythm. No obvious extremity " edema.  GI: palpation of masses. No tenderness. No hepatosplenomegaly to palpation.  Musculoskeletal: normal range of motion of bilateral upper extremities. Normal muscle strength and tone.  Skin: no obvious rashes or lesions. No tightening of skin noted.  Neurologic: CN grossly normal. Normal sensation.   Psychiatric: awake, alert and oriented x 3. Mood and affect normal. Cooperative.    Assessment:       1. Ureteral stone    2. Kidney stone    3. Calculus of urinary tract in diseases classified elsewhere          Plan:   Per AUA Guidelines/NIH.Gov on kidney stone trial of passage.  Please be advised that Up to 95% of 2-4 mm ureteral stones will pass spontaneously over a period of 40 days by medical observation alone.  Stone passage rate up to 80% for distal ureteral stone size of 4.6-6.7 mm has also been reported.   A period of 2-6 weeks of clinical observation and medical therapy has been recommended in literature to maximize the chance for spontaneous stone passage.      CT RSS in 2 weeks with BUN/CR recheck.  Stone analysis to be performed on stone brought into clinic today. (It's an old stone)    F/u TBMICK Puentes, MARCUS

## 2024-06-11 LAB
COMPN STONE: NORMAL
LABORATORY COMMENT REPORT: NORMAL
SPECIMEN SOURCE: NORMAL
STONE ANALYSIS IR-IMP: NORMAL

## 2024-06-13 ENCOUNTER — TELEPHONE (OUTPATIENT)
Dept: UROLOGY | Facility: CLINIC | Age: 39
End: 2024-06-13
Payer: COMMERCIAL

## 2024-06-13 NOTE — TELEPHONE ENCOUNTER
----- Message from Zarina Toribio sent at 6/13/2024  4:43 PM CDT -----  Type:  Patient Returning Call    Who Called:PT  Who Left Message for Patient:NURSE  Does the patient know what this is regarding?:Pt returning missed call in regards to kidney stone results  Would the patient rather a call back or a response via [facility]? CALL  Best Call Back Number:.Telephone Information:  Mobile          [phone]      Additional Information: Please advise thank you

## 2024-06-24 ENCOUNTER — HOSPITAL ENCOUNTER (OUTPATIENT)
Dept: RADIOLOGY | Facility: HOSPITAL | Age: 39
Discharge: HOME | End: 2024-06-24
Attending: NURSE PRACTITIONER
Payer: COMMERCIAL

## 2024-06-24 ENCOUNTER — LAB VISIT (OUTPATIENT)
Dept: LAB | Facility: HOSPITAL | Age: 39
End: 2024-06-24
Attending: NURSE PRACTITIONER
Payer: COMMERCIAL

## 2024-06-24 ENCOUNTER — PATIENT MESSAGE (OUTPATIENT)
Dept: UROLOGY | Facility: CLINIC | Age: 39
End: 2024-06-24
Payer: COMMERCIAL

## 2024-06-24 ENCOUNTER — TELEPHONE (OUTPATIENT)
Dept: UROLOGY | Facility: CLINIC | Age: 39
End: 2024-06-24

## 2024-06-24 DIAGNOSIS — N20.1 URETERAL STONE: Primary | ICD-10-CM

## 2024-06-24 DIAGNOSIS — N22 CALCULUS OF URINARY TRACT IN DISEASES CLASSIFIED ELSEWHERE: ICD-10-CM

## 2024-06-24 DIAGNOSIS — N20.1 URETERAL STONE: ICD-10-CM

## 2024-06-24 DIAGNOSIS — N20.0 KIDNEY STONE: ICD-10-CM

## 2024-06-24 LAB
ANION GAP SERPL CALC-SCNC: 8 MMOL/L (ref 8–16)
BUN SERPL-MCNC: 10 MG/DL (ref 6–20)
CALCIUM SERPL-MCNC: 9.6 MG/DL (ref 8.7–10.5)
CHLORIDE SERPL-SCNC: 106 MMOL/L (ref 95–110)
CO2 SERPL-SCNC: 25 MMOL/L (ref 23–29)
CREAT SERPL-MCNC: 1.1 MG/DL (ref 0.5–1.4)
EST. GFR  (NO RACE VARIABLE): >60 ML/MIN/1.73 M^2
GLUCOSE SERPL-MCNC: 98 MG/DL (ref 70–110)
POTASSIUM SERPL-SCNC: 4.2 MMOL/L (ref 3.5–5.1)
SODIUM SERPL-SCNC: 139 MMOL/L (ref 136–145)

## 2024-06-24 PROCEDURE — 36415 COLL VENOUS BLD VENIPUNCTURE: CPT | Performed by: NURSE PRACTITIONER

## 2024-06-24 PROCEDURE — 80048 BASIC METABOLIC PNL TOTAL CA: CPT | Performed by: NURSE PRACTITIONER

## 2024-06-24 PROCEDURE — 74176 CT ABD & PELVIS W/O CONTRAST: CPT | Mod: TC

## 2024-06-24 RX ORDER — CIPROFLOXACIN 2 MG/ML
400 INJECTION, SOLUTION INTRAVENOUS
OUTPATIENT
Start: 2024-06-24

## 2024-06-24 NOTE — PROGRESS NOTES
Procedure Order to Urology [[id number]]    Electronically signed by: Beatrice Puentes FNP on 06/24/24 1325 Status: Active   Ordering user: Beatrice Puentes FNP 06/24/24 1325 Authorized by: Beatrice Puentes FNP   Ordering mode: Standard   Frequency:  06/24/24 -     Diagnoses  Ureteral stone [N20.1]   Questionnaire    Question Answer   Procedure Ureteroscopy Stone Extraction (Remove Calculus Ureter) Comment - Ce  7/5/24   Facility Name: [facility]   Please order out patient hospital.CBC, BMP, U/A and culture , EKG over 40, Start IV,NPO, General sedation ,  Ancef 2gram ( alternative for pcn allergy cipro 400mg IV ) cpt- [id number]

## 2024-06-26 DIAGNOSIS — N20.0 KIDNEY STONE: ICD-10-CM

## 2024-06-26 RX ORDER — ONDANSETRON 4 MG/1
4 TABLET, ORALLY DISINTEGRATING ORAL EVERY 8 HOURS PRN
Qty: 12 TABLET | Refills: 0 | Status: SHIPPED | OUTPATIENT
Start: 2024-06-26 | End: 2024-06-27 | Stop reason: SDUPTHER

## 2024-06-27 DIAGNOSIS — N20.0 KIDNEY STONE: ICD-10-CM

## 2024-06-27 RX ORDER — KETOROLAC TROMETHAMINE 10 MG/1
10 TABLET, FILM COATED ORAL EVERY 6 HOURS
COMMUNITY

## 2024-06-27 RX ORDER — TAMSULOSIN HYDROCHLORIDE 0.4 MG/1
0.4 CAPSULE ORAL DAILY
COMMUNITY
End: 2024-06-27 | Stop reason: SDUPTHER

## 2024-06-27 RX ORDER — TRAMADOL HYDROCHLORIDE 50 MG/1
50 TABLET ORAL EVERY 6 HOURS PRN
Qty: 20 TABLET | Refills: 0 | Status: SHIPPED | OUTPATIENT
Start: 2024-06-27

## 2024-06-27 RX ORDER — ONDANSETRON 4 MG/1
4 TABLET, ORALLY DISINTEGRATING ORAL EVERY 8 HOURS PRN
Qty: 15 TABLET | Refills: 0 | Status: SHIPPED | OUTPATIENT
Start: 2024-06-27

## 2024-06-27 RX ORDER — TAMSULOSIN HYDROCHLORIDE 0.4 MG/1
0.4 CAPSULE ORAL DAILY
Qty: 30 CAPSULE | Refills: 0 | Status: SHIPPED | OUTPATIENT
Start: 2024-06-27 | End: 2024-07-27

## 2024-06-27 NOTE — PROGRESS NOTES
Sent a secure chat message to Dr Encinas's office about pt reporting that he needed prescriptions for kidney stone pain. Told patient that I would message and that he should follow up with the office as well, v/u.

## 2024-06-27 NOTE — PROGRESS NOTES
Dr Encinas called in scripts for patient when she read the secure chat messages. Notified the patient of the update for the medications, v/u.

## 2024-07-03 ENCOUNTER — ANESTHESIA EVENT (OUTPATIENT)
Dept: SURGERY | Facility: HOSPITAL | Age: 39
End: 2024-07-03
Payer: COMMERCIAL

## 2024-07-05 ENCOUNTER — ANESTHESIA (OUTPATIENT)
Dept: SURGERY | Facility: HOSPITAL | Age: 39
End: 2024-07-05
Payer: COMMERCIAL

## 2024-07-05 ENCOUNTER — HOSPITAL ENCOUNTER (OUTPATIENT)
Facility: HOSPITAL | Age: 39
Discharge: HOME | End: 2024-07-05
Attending: STUDENT IN AN ORGANIZED HEALTH CARE EDUCATION/TRAINING PROGRAM | Admitting: STUDENT IN AN ORGANIZED HEALTH CARE EDUCATION/TRAINING PROGRAM
Payer: COMMERCIAL

## 2024-07-05 DIAGNOSIS — N20.1 URETERAL STONE: Primary | ICD-10-CM

## 2024-07-05 DIAGNOSIS — N20.0 KIDNEY STONE: ICD-10-CM

## 2024-07-05 PROCEDURE — C1894 INTRO/SHEATH, NON-LASER: HCPCS | Performed by: STUDENT IN AN ORGANIZED HEALTH CARE EDUCATION/TRAINING PROGRAM

## 2024-07-05 PROCEDURE — 52356 CYSTO/URETERO W/LITHOTRIPSY: CPT | Mod: LT,,, | Performed by: STUDENT IN AN ORGANIZED HEALTH CARE EDUCATION/TRAINING PROGRAM

## 2024-07-05 PROCEDURE — 63600175 PHARM REV CODE 636 W HCPCS: Performed by: STUDENT IN AN ORGANIZED HEALTH CARE EDUCATION/TRAINING PROGRAM

## 2024-07-05 PROCEDURE — C1747 OPTIME ENDOSCOPE, SINGLE, URINARY TRACT: HCPCS | Performed by: STUDENT IN AN ORGANIZED HEALTH CARE EDUCATION/TRAINING PROGRAM

## 2024-07-05 PROCEDURE — 63600175 PHARM REV CODE 636 W HCPCS: Performed by: NURSE ANESTHETIST, CERTIFIED REGISTERED

## 2024-07-05 PROCEDURE — 63600175 PHARM REV CODE 636 W HCPCS: Performed by: ANESTHESIOLOGY

## 2024-07-05 PROCEDURE — 74420 UROGRAPHY RTRGR +-KUB: CPT | Mod: 26,,, | Performed by: STUDENT IN AN ORGANIZED HEALTH CARE EDUCATION/TRAINING PROGRAM

## 2024-07-05 PROCEDURE — 71000015 HC POSTOP RECOV 1ST HR: Performed by: STUDENT IN AN ORGANIZED HEALTH CARE EDUCATION/TRAINING PROGRAM

## 2024-07-05 PROCEDURE — C1769 GUIDE WIRE: HCPCS | Performed by: STUDENT IN AN ORGANIZED HEALTH CARE EDUCATION/TRAINING PROGRAM

## 2024-07-05 PROCEDURE — 82365 CALCULUS SPECTROSCOPY: CPT | Performed by: STUDENT IN AN ORGANIZED HEALTH CARE EDUCATION/TRAINING PROGRAM

## 2024-07-05 PROCEDURE — 37000009 HC ANESTHESIA EA ADD 15 MINS: Performed by: STUDENT IN AN ORGANIZED HEALTH CARE EDUCATION/TRAINING PROGRAM

## 2024-07-05 PROCEDURE — 94799 UNLISTED PULMONARY SVC/PX: CPT

## 2024-07-05 PROCEDURE — 36000707: Performed by: STUDENT IN AN ORGANIZED HEALTH CARE EDUCATION/TRAINING PROGRAM

## 2024-07-05 PROCEDURE — 25000003 PHARM REV CODE 250: Performed by: ANESTHESIOLOGY

## 2024-07-05 PROCEDURE — 36000706: Performed by: STUDENT IN AN ORGANIZED HEALTH CARE EDUCATION/TRAINING PROGRAM

## 2024-07-05 PROCEDURE — 25000003 PHARM REV CODE 250: Performed by: NURSE ANESTHETIST, CERTIFIED REGISTERED

## 2024-07-05 PROCEDURE — 71000033 HC RECOVERY, INTIAL HOUR: Performed by: STUDENT IN AN ORGANIZED HEALTH CARE EDUCATION/TRAINING PROGRAM

## 2024-07-05 PROCEDURE — 27201423 OPTIME MED/SURG SUP & DEVICES STERILE SUPPLY: Performed by: STUDENT IN AN ORGANIZED HEALTH CARE EDUCATION/TRAINING PROGRAM

## 2024-07-05 PROCEDURE — C1758 CATHETER, URETERAL: HCPCS | Performed by: STUDENT IN AN ORGANIZED HEALTH CARE EDUCATION/TRAINING PROGRAM

## 2024-07-05 PROCEDURE — 25500020 PHARM REV CODE 255: Performed by: STUDENT IN AN ORGANIZED HEALTH CARE EDUCATION/TRAINING PROGRAM

## 2024-07-05 PROCEDURE — 71000039 HC RECOVERY, EACH ADD'L HOUR: Performed by: STUDENT IN AN ORGANIZED HEALTH CARE EDUCATION/TRAINING PROGRAM

## 2024-07-05 PROCEDURE — 71000016 HC POSTOP RECOV ADDL HR: Performed by: STUDENT IN AN ORGANIZED HEALTH CARE EDUCATION/TRAINING PROGRAM

## 2024-07-05 PROCEDURE — 37000008 HC ANESTHESIA 1ST 15 MINUTES: Performed by: STUDENT IN AN ORGANIZED HEALTH CARE EDUCATION/TRAINING PROGRAM

## 2024-07-05 PROCEDURE — C2617 STENT, NON-COR, TEM W/O DEL: HCPCS | Performed by: STUDENT IN AN ORGANIZED HEALTH CARE EDUCATION/TRAINING PROGRAM

## 2024-07-05 DEVICE — STENT SET URETERAL 6X26CM: Type: IMPLANTABLE DEVICE | Site: URETER | Status: FUNCTIONAL

## 2024-07-05 RX ORDER — ONDANSETRON 4 MG/1
8 TABLET, ORALLY DISINTEGRATING ORAL EVERY 8 HOURS PRN
Status: DISCONTINUED | OUTPATIENT
Start: 2024-07-05 | End: 2024-07-05 | Stop reason: HOSPADM

## 2024-07-05 RX ORDER — PHENYLEPHRINE HYDROCHLORIDE 10 MG/ML
INJECTION INTRAVENOUS
Status: DISCONTINUED | OUTPATIENT
Start: 2024-07-05 | End: 2024-07-05

## 2024-07-05 RX ORDER — DEXAMETHASONE SODIUM PHOSPHATE 4 MG/ML
INJECTION, SOLUTION INTRA-ARTICULAR; INTRALESIONAL; INTRAMUSCULAR; INTRAVENOUS; SOFT TISSUE
Status: DISCONTINUED | OUTPATIENT
Start: 2024-07-05 | End: 2024-07-05

## 2024-07-05 RX ORDER — LIDOCAINE HYDROCHLORIDE 10 MG/ML
1 INJECTION, SOLUTION EPIDURAL; INFILTRATION; INTRACAUDAL; PERINEURAL ONCE
Status: DISCONTINUED | OUTPATIENT
Start: 2024-07-05 | End: 2024-07-05 | Stop reason: HOSPADM

## 2024-07-05 RX ORDER — PROPOFOL 10 MG/ML
VIAL (ML) INTRAVENOUS
Status: DISCONTINUED | OUTPATIENT
Start: 2024-07-05 | End: 2024-07-05

## 2024-07-05 RX ORDER — DEXMEDETOMIDINE HYDROCHLORIDE 100 UG/ML
INJECTION, SOLUTION INTRAVENOUS
Status: DISCONTINUED | OUTPATIENT
Start: 2024-07-05 | End: 2024-07-05

## 2024-07-05 RX ORDER — PHENAZOPYRIDINE HYDROCHLORIDE 100 MG/1
100 TABLET, FILM COATED ORAL 3 TIMES DAILY PRN
Qty: 30 TABLET | Refills: 0 | Status: SHIPPED | OUTPATIENT
Start: 2024-07-05 | End: 2024-07-15

## 2024-07-05 RX ORDER — OXYCODONE HYDROCHLORIDE 5 MG/1
5 TABLET ORAL
Status: DISCONTINUED | OUTPATIENT
Start: 2024-07-05 | End: 2024-07-05 | Stop reason: HOSPADM

## 2024-07-05 RX ORDER — OXYCODONE HYDROCHLORIDE 5 MG/1
5 TABLET ORAL EVERY 6 HOURS PRN
Status: COMPLETED | OUTPATIENT
Start: 2024-07-05 | End: 2024-07-05

## 2024-07-05 RX ORDER — ACETAMINOPHEN 10 MG/ML
INJECTION, SOLUTION INTRAVENOUS
Status: DISCONTINUED | OUTPATIENT
Start: 2024-07-05 | End: 2024-07-05

## 2024-07-05 RX ORDER — HYDROCODONE BITARTRATE AND ACETAMINOPHEN 5; 325 MG/1; MG/1
1 TABLET ORAL EVERY 4 HOURS PRN
Status: DISCONTINUED | OUTPATIENT
Start: 2024-07-05 | End: 2024-07-05

## 2024-07-05 RX ORDER — ONDANSETRON HYDROCHLORIDE 2 MG/ML
INJECTION, SOLUTION INTRAVENOUS
Status: DISCONTINUED | OUTPATIENT
Start: 2024-07-05 | End: 2024-07-05

## 2024-07-05 RX ORDER — LIDOCAINE HYDROCHLORIDE 20 MG/ML
INJECTION INTRAVENOUS
Status: DISCONTINUED | OUTPATIENT
Start: 2024-07-05 | End: 2024-07-05

## 2024-07-05 RX ORDER — ONDANSETRON HYDROCHLORIDE 2 MG/ML
4 INJECTION, SOLUTION INTRAVENOUS ONCE
Status: COMPLETED | OUTPATIENT
Start: 2024-07-05 | End: 2024-07-05

## 2024-07-05 RX ORDER — FENTANYL CITRATE 50 UG/ML
INJECTION, SOLUTION INTRAMUSCULAR; INTRAVENOUS
Status: DISCONTINUED | OUTPATIENT
Start: 2024-07-05 | End: 2024-07-05

## 2024-07-05 RX ORDER — MIDAZOLAM HYDROCHLORIDE 1 MG/ML
INJECTION INTRAMUSCULAR; INTRAVENOUS
Status: DISCONTINUED | OUTPATIENT
Start: 2024-07-05 | End: 2024-07-05

## 2024-07-05 RX ORDER — ONDANSETRON HYDROCHLORIDE 2 MG/ML
4 INJECTION, SOLUTION INTRAVENOUS DAILY PRN
Status: DISCONTINUED | OUTPATIENT
Start: 2024-07-05 | End: 2024-07-05 | Stop reason: HOSPADM

## 2024-07-05 RX ORDER — TRAMADOL HYDROCHLORIDE 50 MG/1
50 TABLET ORAL EVERY 6 HOURS PRN
Qty: 20 TABLET | Refills: 0 | Status: SHIPPED | OUTPATIENT
Start: 2024-07-05

## 2024-07-05 RX ORDER — SODIUM CHLORIDE 0.9 % (FLUSH) 0.9 %
3 SYRINGE (ML) INJECTION
Status: DISCONTINUED | OUTPATIENT
Start: 2024-07-05 | End: 2024-07-05 | Stop reason: HOSPADM

## 2024-07-05 RX ORDER — CIPROFLOXACIN 2 MG/ML
400 INJECTION, SOLUTION INTRAVENOUS
Status: COMPLETED | OUTPATIENT
Start: 2024-07-05 | End: 2024-07-05

## 2024-07-05 RX ORDER — FENTANYL CITRATE 50 UG/ML
25 INJECTION, SOLUTION INTRAMUSCULAR; INTRAVENOUS EVERY 5 MIN PRN
Status: DISCONTINUED | OUTPATIENT
Start: 2024-07-05 | End: 2024-07-05 | Stop reason: HOSPADM

## 2024-07-05 RX ADMIN — SODIUM CHLORIDE, SODIUM GLUCONATE, SODIUM ACETATE, POTASSIUM CHLORIDE AND MAGNESIUM CHLORIDE: 526; 502; 368; 37; 30 INJECTION, SOLUTION INTRAVENOUS at 09:07

## 2024-07-05 RX ADMIN — DEXAMETHASONE SODIUM PHOSPHATE 4 MG: 4 INJECTION, SOLUTION INTRA-ARTICULAR; INTRALESIONAL; INTRAMUSCULAR; INTRAVENOUS; SOFT TISSUE at 12:07

## 2024-07-05 RX ADMIN — FENTANYL CITRATE 50 MCG: 50 INJECTION, SOLUTION INTRAMUSCULAR; INTRAVENOUS at 12:07

## 2024-07-05 RX ADMIN — SODIUM CHLORIDE, SODIUM GLUCONATE, SODIUM ACETATE, POTASSIUM CHLORIDE AND MAGNESIUM CHLORIDE: 526; 502; 368; 37; 30 INJECTION, SOLUTION INTRAVENOUS at 01:07

## 2024-07-05 RX ADMIN — MIDAZOLAM HYDROCHLORIDE 2 MG: 1 INJECTION INTRAMUSCULAR; INTRAVENOUS at 11:07

## 2024-07-05 RX ADMIN — FENTANYL CITRATE 50 MCG: 50 INJECTION, SOLUTION INTRAMUSCULAR; INTRAVENOUS at 11:07

## 2024-07-05 RX ADMIN — ONDANSETRON 4 MG: 2 INJECTION INTRAMUSCULAR; INTRAVENOUS at 02:07

## 2024-07-05 RX ADMIN — DEXMEDETOMIDINE HYDROCHLORIDE 8 MCG: 100 INJECTION, SOLUTION INTRAVENOUS at 12:07

## 2024-07-05 RX ADMIN — CIPROFLOXACIN 400 MG: 2 INJECTION INTRAVENOUS at 12:07

## 2024-07-05 RX ADMIN — ONDANSETRON 4 MG: 2 INJECTION INTRAMUSCULAR; INTRAVENOUS at 12:07

## 2024-07-05 RX ADMIN — LIDOCAINE HYDROCHLORIDE 100 MG: 20 INJECTION, SOLUTION INTRAVENOUS at 12:07

## 2024-07-05 RX ADMIN — OXYCODONE HYDROCHLORIDE 5 MG: 5 TABLET ORAL at 01:07

## 2024-07-05 RX ADMIN — GLYCOPYRROLATE 0.2 MG: 0.2 INJECTION, SOLUTION INTRAMUSCULAR; INTRAVITREAL at 11:07

## 2024-07-05 RX ADMIN — ACETAMINOPHEN 1000 MG: 10 INJECTION, SOLUTION INTRAVENOUS at 12:07

## 2024-07-05 RX ADMIN — PHENYLEPHRINE HYDROCHLORIDE 100 MCG: 10 INJECTION INTRAVENOUS at 12:07

## 2024-07-05 RX ADMIN — PROPOFOL 200 MG: 10 INJECTION, EMULSION INTRAVENOUS at 12:07

## 2024-07-05 RX ADMIN — OXYCODONE HYDROCHLORIDE 5 MG: 5 TABLET ORAL at 02:07

## 2024-07-05 NOTE — DISCHARGE SUMMARY
[facility]  Discharge Note  Short Stay    Admit Date: 7/5/2024    Discharge Date and Time: 07/05/2024 1:00 PM      Attending Physician: Milana Encinas MD     Discharge Provider: Milana Encinas    Diagnoses:  Active Hospital Problems    Diagnosis  POA    *Ureteral stone [N20.1]  Yes      Resolved Hospital Problems   No resolved problems to display.       Discharged Condition: good    Hospital Course: Patient was admitted for outpatient procedure and tolerated the procedure well with no complications. The patient was discharged home in good condition on the same day.       Final Diagnoses: Same as principal problem.    Disposition: Home or Self Care    Follow up/Patient Instructions:    Medications:  Reconciled Home Medications:   Current Discharge Medication List        START taking these medications    Details   phenazopyridine (PYRIDIUM) 100 MG tablet Take 1 tablet (100 mg total) by mouth 3 (three) times daily as needed.  Qty: 30 tablet, Refills: 0           CONTINUE these medications which have CHANGED    Details   traMADoL (ULTRAM) 50 mg tablet Take 1 tablet (50 mg total) by mouth every 6 (six) hours as needed for Pain.  Qty: 20 tablet, Refills: 0    Comments: Quantity prescribed more than 7 day supply? No  Associated Diagnoses: Kidney stone           CONTINUE these medications which have NOT CHANGED    Details   ondansetron (ZOFRAN-ODT) 4 MG TbDL Take 1 tablet (4 mg total) by mouth every 8 (eight) hours as needed (nausea/vomiting).  Qty: 15 tablet, Refills: 0      ketorolac (TORADOL) 10 mg tablet Take 10 mg by mouth every 6 (six) hours.      tamsulosin (FLOMAX) 0.4 mg Cap Take 1 capsule (0.4 mg total) by mouth once daily.  Qty: 30 capsule, Refills: 0           Discharge Procedure Orders   US Retroperitoneal Complete   Standing Status: Future Standing Exp. Date: 07/05/25     Order Specific Question Answer Comments   May the Radiologist modify the order per protocol to meet the clinical needs  of the patient? Yes    Release to patient Immediate      Diet general     Call MD for:  temperature >100.4     Call MD for:  persistent nausea and vomiting     Call MD for:  severe uncontrolled pain     No dressing needed     Activity as tolerated     Procedure Order to Urology   Standing Status: Future Standing Exp. Date: 07/05/25     Order Specific Question Answer Comments   Procedure Cystoscopy with Stent Removal left, 7/31   Facility Name: [facility]       Follow-up Information       Milana Encinas MD Follow up on 7/31/2024.    Specialty: Urology  Why: stent removal  Contact information:  [address] [place] [address]  [address]  [facility] [address]  [phone]                               Milana Encinas MD  Urology Department

## 2024-07-05 NOTE — TRANSFER OF CARE
"Anesthesia Transfer of Care Note    Patient: Yovani Melvin    Procedure(s) Performed: Procedure(s) (LRB):  REMOVAL, CALCULUS, URETER, URETEROSCOPIC (N/A)  CYSTOSCOPY, WITH RETROGRADE PYELOGRAM AND URETERAL STENT INSERTION (Left)    Patient location: PACU    Anesthesia Type: general    Transport from OR: Transported from OR on 2-3 L/min O2 by NC with adequate spontaneous ventilation    Post pain: adequate analgesia    Post assessment: no apparent anesthetic complications and tolerated procedure well    Post vital signs: stable    Level of consciousness: sedated and responds to stimulation    Nausea/Vomiting: no nausea/vomiting    Complications: none    Transfer of care protocol was followed    Last vitals: Visit Vitals  BP (!) 140/91 (BP Location: Right arm, Patient Position: Lying)   Pulse 84   Temp 37 °C (98.6 °F) (Skin)   Resp 16   Ht 5' 8" (1.727 m)   Wt 77.1 kg (170 lb)   SpO2 99%   BMI 25.85 kg/m²     "

## 2024-07-05 NOTE — OP NOTE
[facility]  Operative Note    Date: 07/05/2024    Pre-Op Diagnosis: Right ureteral stone    Post-Op Diagnosis: same    Procedure(s) Performed:   1.  Left ureteroscopy  2.  Cystoscopy  3.  Laser lithotripsy   4.  Stone basket extraction   5.  Left retrograde pyelogram   6.  Left ureteral stent placement   7.  Fluoro < 1 h    Specimen(s): stone for analysis     Staff Surgeon:  Milana Encinas MD    Anesthesia: General LMA anesthesia    Indications: Yovani Melvin is a 39 y.o. male with a left ureteral stone, presenting for definitive stone management.  He currently does not have a JJ ureteral stent in place.      Findings:   - difficulty passing scope into distal ureter requiring placement of access sheath into the mid ureter, this passed without complication   - severe impaction of proximal left ureteral stone with surrounding inflammation   - on attempted advancement up to the stone there was a large posterior false passage created just distal to the stone level. Remainder of mucosa circumferentially appeared intact and undamaged.  - stone was ultimately able to be grasped and pulled down into the mid ureter, here it was fragmented and removed  - a large amount of extravasation was seen on RGP however I was able to navigate the scope into the renal pelvis to confirm wire was in correct location       Estimated Blood Loss: min    Drains: 6 Fr x 26 cm JJ ureteral stent without strings    Procedure in detail:  After informed consent was obtained, the patient was brought the the cystoscopy suite and placed in the supine position.  SCDs were applied and working.  Anesthesia was administered.  The patient was then placed in the dorsal lithotomy position and prepped and draped in the usual sterile fashion.      A rigid cystoscope in a 22 Fr sheath was introduced into the patient's urethra.  This passed easily.  The entire urethra was visualized which showed no strictures or masses.  Formal cystoscopy was  performed which revealed no masses or lesions suspicious for malignancy, no bladder stones, no bladder diverticuli, no trabeculations.  The ureteral orifices were visualized in the normal anatomic position bilaterally.    A motion wire was passed up the left ureteral orifice and up into the kidney.  There was difficulty passing the wire into the renal pelvis past the level of the suspected stone.  The cystoscope was removed keeping the guidewire in place and the wire was secured to the drape.      An 8 Fr rigid ureteroscope was passed into the patient's bladder alongside the wire under direct vision.  It was then passed through the left ureteral orifice alongside the wire.  I was then unable to advance the scope past the distal ureter over the iliacs. A second wire was placed. I attempted to then advance just the flexible scope over the wire into the mid ureter however this was unsuccessful. I then was able to gently pass a 10/12 ureteral access sheath over the wire into the mid ureter.     The flexible scope was then inserted up to the proximal ureter without difficulty. At the level of the known stone there was severe impaction and inflammation noted. On attempts to advance scope up to the stone a posterior false passage was created. A second motion wire was placed through the true lumen and the scope was advanced up to the stone. The stone was grasped and was able to be pulled into the mid ureter. Here, a 272 micro laser fiber was used to fragment the stone and all fragments were removed using a Nitinol tipless basket.     The scope was advanced back to the proximal ureter. A RGP was performed which did show a large amount of extravasation. However, I was then able to easily advance the scope into the renal pelvis and confirmed correct placement of the wire. The scope was removed. The access sheath was removed under direct vision, no other significant trauma was noted to the ureter.     A 6 Fr x 26 cm JJ ureteral  stent without strings was passed over the wire and up into the renal pelvis using fluoro as well as direct vision.  When the coil appeared to be in good position in the kidney the wire was removed under continuous fluoro.  Good coils were seen in the kidney and the bladder using fluoro.      A 16 fr tyler was placed for maximal drainage.    The patient tolerated the procedure well and was transferred to the recovery room in stable condition.      Disposition:  The patient will follow up on 7/31 for stent removal, and then in 3 months with a renal US. He will remove his tyler at home in 72 hours.     Milana Encinas MD

## 2024-07-05 NOTE — PLAN OF CARE
Patient and spouse given discharge instructions. Educated on post-anesthesia precautions, catheter and stent care and when to notify MD. Instructed when to follow up. Peripheral IV discontinued with catheter intact. Transferred to car via wheelchair and left with spouse to home.

## 2024-07-05 NOTE — ANESTHESIA PROCEDURE NOTES
Intubation    Date/Time: 7/5/2024 12:09 PM    Performed by: Froy Reyes CRNA  Authorized by: Renny Curtis MD    Intubation:     Induction:  Intravenous    Intubated:  Postinduction    Mask Ventilation:  Not attempted    Attempts:  1    Attempted By:  CRNA    Difficult Airway Encountered?: No      Complications:  None    Airway Device:  Supraglottic airway/LMA    Airway Device Size:  4.0    Style/Cuff Inflation:  Cuffed (inflated to minimal occlusive pressure)    Placement Verified By:  Capnometry    Complicating Factors:  None    Findings Post-Intubation:  BS equal bilateral and atraumatic/condition of teeth unchanged

## 2024-07-05 NOTE — DISCHARGE INSTRUCTIONS
The patient will follow up on 7/31 for stent removal, and then in 3 months with a renal US. He will remove his tyler at home in 72 hours.      Milana Encinas MD          Using an Incentive Spirometer    An incentive spirometer is a device that helps you do deep breathing exercises. These exercises expand your lungs, aid in circulation, and help prevent pneumonia. Deep breathing exercises also help you breathe better and improve the function of your lungs by:  Keeping your lungs clear  Strengthening your breathing muscles  Helping prevent respiratory complications or problems  The incentive spirometer gives you a way to take an active part in recover. A nurse or therapist will teach you breathing exercises. To do these exercises, you will breathe in through your mouth and not your nose. The incentive spirometer only works correctly if you breathe in through your mouth.  Steps to clear lungs  Step 1. Exhale normally. Then, inhale normally.  Relax and breathe out.  Step 2. Place your lips tightly around the mouthpiece.  Make sure the device is upright and not tilted.  Step 3. Inhale as much air as you can through the mouthpiece (don't breath through your nose).  Inhale slowly and deeply.  Hold your breath long enough to keep the balls or disk raised for at least 3 to 5 seconds, or as instructed by your healthcare provider.  Some spirometers have an indicator to let you know that you are breathing in too fast. If the indicator goes off, breathe in more slowly.  Step 4. Repeat the exercise regularly.  Do this exercise every hour while you're awake, or as instructed by your healthcare provider.  If you were taught deep breathing and coughing exercises, do them regularly as instructed by your healthcare provider.             Post op instructions for prevention of DVT  What is deep vein thrombosis?  Deep vein thrombosis (DVT) is the medical term for blood clots in the deep veins of the leg.  These blood clots can be  dangerous.  A DVT can block a blood vessel and keep blood from getting where it needs to go.  Another problem is that the clot can travel to other parts of the body such as the lungs.  A clot that travels to the lungs is called a pulmonary embolus (PE) and can cause serious problems with breathing which can lead to death.  Am I at risk for DVT/PE?  If you are not very active, you are at risk of DVT.  Anyone confined to bed, sitting for long periods of time, recovering from surgery, etc. increases the risk of DVT.  Other risk factors are cancer diagnosis, certain medications, estrogen replacement in any form,older age, obesity, pregnancy, smoking, history of clotting disorders, and dehydration.  How will I know if I have a DVT?  Swelling in the lower leg  Pain  Warmth, redness, hardness or bulging of the vein  If you have any of these symptoms, call your doctors office right away.  Some people will not have any symptoms until the clot moves to the lungs.  What are the symptoms of a PE?  Panting, shortness of breath, or trouble breathing  Sharp, knife-like chest pain when you breathe  Coughing or coughing up blood  Rapid heartbeat  If you have any of these symptoms or get worse quickly, call 911 for emergency treatment.  How can I prevent a DVT?  Avoid long periods of inactivity and dont cross your legs--get up and walk around every hour or so.  Stay active--walking after surgery is highly encouraged.  This means you should get out of the house and walk in the neighborhood.  Going up and down stairs will not impair healing (unless advised against such activity by your doctor).    Drink plenty of noncaffeinated, nonalcoholic fluids each day to prevent dehydration.  Wear special support stockings, if they have been advised by your doctor.  If you travel, stop at least once an hour and walk around.  Avoid smoking (assistance with stopping is available through your healthcare provider)  Always notify your doctor if you  are not able to follow the post operative instructions that are given to you at the time of discharge.  It may be necessary to prescribe one of the medications available to prevent DVT.

## 2024-07-05 NOTE — ANESTHESIA PREPROCEDURE EVALUATION
07/05/2024  Yovani Melvin is a 39 y.o., male.      Pre-op Assessment    I have reviewed the Patient Summary Reports.     I have reviewed the Nursing Notes. I have reviewed the NPO Status.   I have reviewed the Medications.     Review of Systems  Anesthesia Hx:  No previous Anesthesia                Social:  Non-Smoker       Hematology/Oncology:  Hematology Normal   Oncology Normal                                   EENT/Dental:  EENT/Dental Normal           Cardiovascular:  Cardiovascular Normal                                            Pulmonary:  Pulmonary Normal                       Renal/:  Chronic Renal Disease renal calculi               Hepatic/GI:  Hepatic/GI Normal                 Musculoskeletal:  Musculoskeletal Normal                Neurological:  Neurology Normal                                      Endocrine:  Endocrine Normal            Dermatological:  Skin Normal    Psych:  Psychiatric Normal                    Physical Exam  General: Well nourished, Cooperative, Alert and Oriented    Airway:  Mallampati: II   Mouth Opening: Normal  TM Distance: Normal  Tongue: Normal  Neck ROM: Normal ROM    Dental:  Intact    Chest/Lungs:  Normal Respiratory Rate    Heart:  Rate: Normal        Anesthesia Plan  Type of Anesthesia, risks & benefits discussed:    Anesthesia Type: Gen Supraglottic Airway  Intra-op Monitoring Plan: Standard ASA Monitors  Post Op Pain Control Plan: multimodal analgesia  Induction:  IV  Airway Plan: , Post-Induction  Informed Consent: Informed consent signed with the Patient and all parties understand the risks and agree with anesthesia plan.  All questions answered.   ASA Score: 1    Ready For Surgery From Anesthesia Perspective.     .

## 2024-07-06 NOTE — ANESTHESIA POSTPROCEDURE EVALUATION
Anesthesia Post Evaluation    Patient: Yovani Melvin    Procedure(s) Performed: Procedure(s) (LRB):  REMOVAL, CALCULUS, URETER, URETEROSCOPIC (N/A)  CYSTOSCOPY, WITH RETROGRADE PYELOGRAM AND URETERAL STENT INSERTION (Left)    Final Anesthesia Type: general      Patient location during evaluation: PACU  Patient participation: Yes- Able to Participate  Level of consciousness: awake and alert  Post-procedure vital signs: reviewed and stable  Pain management: adequate  Airway patency: patent    PONV status at discharge: No PONV  Anesthetic complications: no      Cardiovascular status: blood pressure returned to baseline  Respiratory status: unassisted and room air  Hydration status: euvolemic  Follow-up not needed.              Vitals Value Taken Time   /81 07/05/24 1437   Temp 36.5 °C (97.7 °F) 07/05/24 1303   Pulse 86 07/05/24 1437   Resp 16 07/05/24 1449   SpO2 96 % 07/05/24 1437         Event Time   Out of Recovery 14:07:00         Pain/Ashvin Score: Pain Rating Prior to Med Admin: 7 (7/5/2024  2:49 PM)  Pain Rating Post Med Admin: 3 (7/5/2024  1:47 PM)  Ashvin Score: 10 (7/5/2024  2:38 PM)

## 2024-07-08 VITALS
SYSTOLIC BLOOD PRESSURE: 120 MMHG | HEART RATE: 86 BPM | BODY MASS INDEX: 25.76 KG/M2 | TEMPERATURE: 98 F | DIASTOLIC BLOOD PRESSURE: 81 MMHG | RESPIRATION RATE: 16 BRPM | WEIGHT: 170 LBS | OXYGEN SATURATION: 96 % | HEIGHT: 68 IN

## 2024-07-08 DIAGNOSIS — N20.0 KIDNEY STONE: Primary | ICD-10-CM

## 2024-07-08 NOTE — PROGRESS NOTES
7/8/24 Stated very pleased with care given.  All staff were kind and helpful. Pt. And/or family member states they have read and understand all discharge instructions. They stated they know how to reach out to MD for any needs.

## 2024-07-08 NOTE — PROGRESS NOTES
Procedure Order to Urology [[id number]]    Electronically signed by: Milana Encinas MD on 07/05/24 1259 Status: Active   Ordering user: Milana Encinas MD 07/05/24 1259 Ordering provider: Milana Encinas MD   Authorized by: Milana Encinas MD Ordering mode: Standard   Frequency:  07/05/24 -     Diagnoses  Ureteral stone [N20.1]   Questionnaire    Question Answer   Procedure Cystoscopy with Stent Removal Comment - left, 7/31   Facility Name: [facility], Please order Local sedation, order poct urine (No urine Dr Booth, or Dr Encinas )

## 2024-07-18 ENCOUNTER — TELEPHONE (OUTPATIENT)
Dept: UROLOGY | Facility: CLINIC | Age: 39
End: 2024-07-18
Payer: COMMERCIAL

## 2024-07-18 ENCOUNTER — PATIENT MESSAGE (OUTPATIENT)
Dept: UROLOGY | Facility: CLINIC | Age: 39
End: 2024-07-18
Payer: COMMERCIAL

## 2024-07-18 NOTE — TELEPHONE ENCOUNTER
Spoke with patient, advisement given, CT rescheduled to be done sooner on next week, patient verbally understood.

## 2024-07-21 ENCOUNTER — HOSPITAL ENCOUNTER (INPATIENT)
Facility: HOSPITAL | Age: 39
LOS: 1 days | Discharge: HOME | DRG: 176 | End: 2024-07-22
Attending: EMERGENCY MEDICINE | Admitting: STUDENT IN AN ORGANIZED HEALTH CARE EDUCATION/TRAINING PROGRAM
Payer: COMMERCIAL

## 2024-07-21 DIAGNOSIS — I26.99 ACUTE PULMONARY EMBOLISM WITHOUT ACUTE COR PULMONALE, UNSPECIFIED PULMONARY EMBOLISM TYPE: Primary | ICD-10-CM

## 2024-07-21 DIAGNOSIS — R07.9 CHEST PAIN: ICD-10-CM

## 2024-07-21 DIAGNOSIS — R00.0 TACHYCARDIA: ICD-10-CM

## 2024-07-21 DIAGNOSIS — I26.99 ACUTE PULMONARY EMBOLISM: ICD-10-CM

## 2024-07-21 PROBLEM — N30.01 ACUTE CYSTITIS WITH HEMATURIA: Status: ACTIVE | Noted: 2024-07-21

## 2024-07-21 PROBLEM — R00.2 PALPITATIONS: Status: ACTIVE | Noted: 2024-07-21

## 2024-07-21 PROBLEM — R55 NEAR SYNCOPE: Status: ACTIVE | Noted: 2024-07-21

## 2024-07-21 LAB
ALBUMIN SERPL BCP-MCNC: 4.1 G/DL (ref 3.5–5.2)
ALLENS TEST: NORMAL
ALP SERPL-CCNC: 67 U/L (ref 55–135)
ALT SERPL W/O P-5'-P-CCNC: 19 U/L (ref 10–44)
ANION GAP SERPL CALC-SCNC: 13 MMOL/L (ref 8–16)
APTT PPP: 30 SEC (ref 21–32)
AST SERPL-CCNC: 12 U/L (ref 10–40)
BACTERIA #/AREA URNS HPF: ABNORMAL /HPF
BASOPHILS # BLD AUTO: 0.05 K/UL (ref 0–0.2)
BASOPHILS # BLD AUTO: 0.07 K/UL (ref 0–0.2)
BASOPHILS NFR BLD: 0.6 % (ref 0–1.9)
BASOPHILS NFR BLD: 0.8 % (ref 0–1.9)
BILIRUB SERPL-MCNC: 0.4 MG/DL (ref 0.1–1)
BILIRUB UR QL STRIP: NEGATIVE
BUN SERPL-MCNC: 12 MG/DL (ref 6–20)
CALCIUM SERPL-MCNC: 9.8 MG/DL (ref 8.7–10.5)
CHLORIDE SERPL-SCNC: 105 MMOL/L (ref 95–110)
CLARITY UR: ABNORMAL
CO2 SERPL-SCNC: 22 MMOL/L (ref 23–29)
COLOR UR: ABNORMAL
CREAT SERPL-MCNC: 1.2 MG/DL (ref 0.5–1.4)
D DIMER PPP IA.FEU-MCNC: 1.01 MG/L FEU
DIFFERENTIAL METHOD BLD: ABNORMAL
DIFFERENTIAL METHOD BLD: ABNORMAL
EOSINOPHIL # BLD AUTO: 0.1 K/UL (ref 0–0.5)
EOSINOPHIL # BLD AUTO: 0.1 K/UL (ref 0–0.5)
EOSINOPHIL NFR BLD: 1.4 % (ref 0–8)
EOSINOPHIL NFR BLD: 1.6 % (ref 0–8)
ERYTHROCYTE [DISTWIDTH] IN BLOOD BY AUTOMATED COUNT: 11.8 % (ref 11.5–14.5)
ERYTHROCYTE [DISTWIDTH] IN BLOOD BY AUTOMATED COUNT: 11.9 % (ref 11.5–14.5)
EST. GFR  (NO RACE VARIABLE): >60 ML/MIN/1.73 M^2
GLUCOSE SERPL-MCNC: 122 MG/DL (ref 70–110)
GLUCOSE UR QL STRIP: NEGATIVE
HCT VFR BLD AUTO: 45.9 % (ref 40–54)
HCT VFR BLD AUTO: 47.6 % (ref 40–54)
HGB BLD-MCNC: 15.3 G/DL (ref 14–18)
HGB BLD-MCNC: 16 G/DL (ref 14–18)
HGB UR QL STRIP: ABNORMAL
HYALINE CASTS #/AREA URNS LPF: 0 /LPF
IMM GRANULOCYTES # BLD AUTO: 0.03 K/UL (ref 0–0.04)
IMM GRANULOCYTES # BLD AUTO: 0.03 K/UL (ref 0–0.04)
IMM GRANULOCYTES NFR BLD AUTO: 0.3 % (ref 0–0.5)
IMM GRANULOCYTES NFR BLD AUTO: 0.4 % (ref 0–0.5)
INR PPP: 1 (ref 0.8–1.2)
KETONES UR QL STRIP: NEGATIVE
LACTATE SERPL-SCNC: 1 MMOL/L (ref 0.5–2.2)
LDH SERPL L TO P-CCNC: 1.12 MMOL/L (ref 0.5–2.2)
LEUKOCYTE ESTERASE UR QL STRIP: ABNORMAL
LYMPHOCYTES # BLD AUTO: 1.1 K/UL (ref 1–4.8)
LYMPHOCYTES # BLD AUTO: 1.3 K/UL (ref 1–4.8)
LYMPHOCYTES NFR BLD: 12.8 % (ref 18–48)
LYMPHOCYTES NFR BLD: 15 % (ref 18–48)
MCH RBC QN AUTO: 30 PG (ref 27–31)
MCH RBC QN AUTO: 30.1 PG (ref 27–31)
MCHC RBC AUTO-ENTMCNC: 33.3 G/DL (ref 32–36)
MCHC RBC AUTO-ENTMCNC: 33.6 G/DL (ref 32–36)
MCV RBC AUTO: 89 FL (ref 82–98)
MCV RBC AUTO: 90 FL (ref 82–98)
MICROSCOPIC COMMENT: ABNORMAL
MONOCYTES # BLD AUTO: 0.6 K/UL (ref 0.3–1)
MONOCYTES # BLD AUTO: 0.7 K/UL (ref 0.3–1)
MONOCYTES NFR BLD: 7.6 % (ref 4–15)
MONOCYTES NFR BLD: 7.6 % (ref 4–15)
NEUTROPHILS # BLD AUTO: 6.4 K/UL (ref 1.8–7.7)
NEUTROPHILS # BLD AUTO: 6.5 K/UL (ref 1.8–7.7)
NEUTROPHILS NFR BLD: 74.9 % (ref 38–73)
NEUTROPHILS NFR BLD: 77 % (ref 38–73)
NITRITE UR QL STRIP: NEGATIVE
NRBC BLD-RTO: 0 /100 WBC
NRBC BLD-RTO: 0 /100 WBC
PH UR STRIP: 6 [PH] (ref 5–8)
PLATELET # BLD AUTO: 275 K/UL (ref 150–450)
PLATELET # BLD AUTO: 288 K/UL (ref 150–450)
PMV BLD AUTO: 9.1 FL (ref 9.2–12.9)
PMV BLD AUTO: 9.3 FL (ref 9.2–12.9)
POTASSIUM SERPL-SCNC: 3.8 MMOL/L (ref 3.5–5.1)
PROCALCITONIN SERPL IA-MCNC: 0.05 NG/ML (ref 0–0.5)
PROT SERPL-MCNC: 7.3 G/DL (ref 6–8.4)
PROT UR QL STRIP: ABNORMAL
PROTHROMBIN TIME: 10.8 SEC (ref 9–12.5)
RBC # BLD AUTO: 5.09 M/UL (ref 4.6–6.2)
RBC # BLD AUTO: 5.34 M/UL (ref 4.6–6.2)
RBC #/AREA URNS HPF: >100 /HPF (ref 0–4)
SAMPLE: NORMAL
SITE: NORMAL
SODIUM SERPL-SCNC: 140 MMOL/L (ref 136–145)
SP GR UR STRIP: 1.02 (ref 1–1.03)
UNIDENT CRYS URNS QL MICRO: 3
URN SPEC COLLECT METH UR: ABNORMAL
UROBILINOGEN UR STRIP-ACNC: NEGATIVE EU/DL
WBC # BLD AUTO: 8.37 K/UL (ref 3.9–12.7)
WBC # BLD AUTO: 8.6 K/UL (ref 3.9–12.7)
WBC #/AREA URNS HPF: >100 /HPF (ref 0–5)

## 2024-07-21 PROCEDURE — 94799 UNLISTED PULMONARY SVC/PX: CPT

## 2024-07-21 PROCEDURE — 81000 URINALYSIS NONAUTO W/SCOPE: CPT | Performed by: EMERGENCY MEDICINE

## 2024-07-21 PROCEDURE — 87040 BLOOD CULTURE FOR BACTERIA: CPT | Mod: 59 | Performed by: EMERGENCY MEDICINE

## 2024-07-21 PROCEDURE — 25000003 PHARM REV CODE 250: Performed by: NURSE PRACTITIONER

## 2024-07-21 PROCEDURE — 94760 N-INVAS EAR/PLS OXIMETRY 1: CPT

## 2024-07-21 PROCEDURE — 11000001 HC ACUTE MED/SURG PRIVATE ROOM

## 2024-07-21 PROCEDURE — 85730 THROMBOPLASTIN TIME PARTIAL: CPT | Performed by: EMERGENCY MEDICINE

## 2024-07-21 PROCEDURE — 83605 ASSAY OF LACTIC ACID: CPT

## 2024-07-21 PROCEDURE — 25000003 PHARM REV CODE 250: Performed by: EMERGENCY MEDICINE

## 2024-07-21 PROCEDURE — 99900035 HC TECH TIME PER 15 MIN (STAT)

## 2024-07-21 PROCEDURE — 96367 TX/PROPH/DG ADDL SEQ IV INF: CPT

## 2024-07-21 PROCEDURE — 36415 COLL VENOUS BLD VENIPUNCTURE: CPT | Performed by: EMERGENCY MEDICINE

## 2024-07-21 PROCEDURE — 80053 COMPREHEN METABOLIC PANEL: CPT | Performed by: EMERGENCY MEDICINE

## 2024-07-21 PROCEDURE — 94761 N-INVAS EAR/PLS OXIMETRY MLT: CPT

## 2024-07-21 PROCEDURE — 84145 PROCALCITONIN (PCT): CPT | Performed by: NURSE PRACTITIONER

## 2024-07-21 PROCEDURE — 99285 EMERGENCY DEPT VISIT HI MDM: CPT | Mod: 25

## 2024-07-21 PROCEDURE — 63600175 PHARM REV CODE 636 W HCPCS: Performed by: EMERGENCY MEDICINE

## 2024-07-21 PROCEDURE — 63600175 PHARM REV CODE 636 W HCPCS: Performed by: NURSE PRACTITIONER

## 2024-07-21 PROCEDURE — 96365 THER/PROPH/DIAG IV INF INIT: CPT

## 2024-07-21 PROCEDURE — 93010 ELECTROCARDIOGRAM REPORT: CPT | Mod: ,,, | Performed by: GENERAL PRACTICE

## 2024-07-21 PROCEDURE — 25500020 PHARM REV CODE 255

## 2024-07-21 PROCEDURE — 85025 COMPLETE CBC W/AUTO DIFF WBC: CPT | Performed by: EMERGENCY MEDICINE

## 2024-07-21 PROCEDURE — 93005 ELECTROCARDIOGRAM TRACING: CPT

## 2024-07-21 PROCEDURE — 85379 FIBRIN DEGRADATION QUANT: CPT | Performed by: EMERGENCY MEDICINE

## 2024-07-21 PROCEDURE — 87086 URINE CULTURE/COLONY COUNT: CPT | Performed by: EMERGENCY MEDICINE

## 2024-07-21 PROCEDURE — 83605 ASSAY OF LACTIC ACID: CPT | Performed by: EMERGENCY MEDICINE

## 2024-07-21 PROCEDURE — 36416 COLLJ CAPILLARY BLOOD SPEC: CPT

## 2024-07-21 PROCEDURE — 85610 PROTHROMBIN TIME: CPT | Performed by: EMERGENCY MEDICINE

## 2024-07-21 RX ORDER — TALC
6 POWDER (GRAM) TOPICAL NIGHTLY PRN
Status: DISCONTINUED | OUTPATIENT
Start: 2024-07-21 | End: 2024-07-22 | Stop reason: HOSPADM

## 2024-07-21 RX ORDER — SODIUM,POTASSIUM PHOSPHATES 280-250MG
2 POWDER IN PACKET (EA) ORAL
Status: DISCONTINUED | OUTPATIENT
Start: 2024-07-21 | End: 2024-07-22 | Stop reason: HOSPADM

## 2024-07-21 RX ORDER — ENOXAPARIN SODIUM 100 MG/ML
1 INJECTION SUBCUTANEOUS EVERY 12 HOURS
Status: DISCONTINUED | OUTPATIENT
Start: 2024-07-21 | End: 2024-07-22

## 2024-07-21 RX ORDER — AMOXICILLIN 250 MG
1 CAPSULE ORAL 2 TIMES DAILY PRN
Status: DISCONTINUED | OUTPATIENT
Start: 2024-07-21 | End: 2024-07-22 | Stop reason: HOSPADM

## 2024-07-21 RX ORDER — ACETAMINOPHEN 325 MG/1
650 TABLET ORAL EVERY 4 HOURS PRN
Status: DISCONTINUED | OUTPATIENT
Start: 2024-07-21 | End: 2024-07-22 | Stop reason: HOSPADM

## 2024-07-21 RX ORDER — SODIUM CHLORIDE 0.9 % (FLUSH) 0.9 %
10 SYRINGE (ML) INJECTION EVERY 12 HOURS PRN
Status: DISCONTINUED | OUTPATIENT
Start: 2024-07-21 | End: 2024-07-22 | Stop reason: HOSPADM

## 2024-07-21 RX ORDER — KETOROLAC TROMETHAMINE 30 MG/ML
15 INJECTION, SOLUTION INTRAMUSCULAR; INTRAVENOUS EVERY 6 HOURS PRN
Status: DISCONTINUED | OUTPATIENT
Start: 2024-07-21 | End: 2024-07-22 | Stop reason: HOSPADM

## 2024-07-21 RX ORDER — ONDANSETRON HYDROCHLORIDE 2 MG/ML
4 INJECTION, SOLUTION INTRAVENOUS EVERY 6 HOURS PRN
Status: DISCONTINUED | OUTPATIENT
Start: 2024-07-21 | End: 2024-07-22 | Stop reason: HOSPADM

## 2024-07-21 RX ORDER — HEPARIN SODIUM,PORCINE/D5W 25000/250
0-40 INTRAVENOUS SOLUTION INTRAVENOUS CONTINUOUS
Status: DISCONTINUED | OUTPATIENT
Start: 2024-07-21 | End: 2024-07-21

## 2024-07-21 RX ORDER — TRAMADOL HYDROCHLORIDE 50 MG/1
50 TABLET ORAL EVERY 6 HOURS PRN
Status: DISCONTINUED | OUTPATIENT
Start: 2024-07-21 | End: 2024-07-22 | Stop reason: HOSPADM

## 2024-07-21 RX ORDER — LANOLIN ALCOHOL/MO/W.PET/CERES
800 CREAM (GRAM) TOPICAL
Status: DISCONTINUED | OUTPATIENT
Start: 2024-07-21 | End: 2024-07-22 | Stop reason: HOSPADM

## 2024-07-21 RX ORDER — IPRATROPIUM BROMIDE AND ALBUTEROL SULFATE 2.5; .5 MG/3ML; MG/3ML
3 SOLUTION RESPIRATORY (INHALATION) EVERY 6 HOURS PRN
Status: DISCONTINUED | OUTPATIENT
Start: 2024-07-21 | End: 2024-07-22 | Stop reason: HOSPADM

## 2024-07-21 RX ORDER — ALUMINUM HYDROXIDE, MAGNESIUM HYDROXIDE, AND SIMETHICONE 1200; 120; 1200 MG/30ML; MG/30ML; MG/30ML
30 SUSPENSION ORAL EVERY 6 HOURS PRN
Status: DISCONTINUED | OUTPATIENT
Start: 2024-07-21 | End: 2024-07-22 | Stop reason: HOSPADM

## 2024-07-21 RX ORDER — PHENAZOPYRIDINE HYDROCHLORIDE 100 MG/1
200 TABLET, FILM COATED ORAL 3 TIMES DAILY PRN
Status: DISCONTINUED | OUTPATIENT
Start: 2024-07-21 | End: 2024-07-22 | Stop reason: HOSPADM

## 2024-07-21 RX ORDER — TAMSULOSIN HYDROCHLORIDE 0.4 MG/1
0.4 CAPSULE ORAL DAILY
Status: DISCONTINUED | OUTPATIENT
Start: 2024-07-22 | End: 2024-07-22 | Stop reason: HOSPADM

## 2024-07-21 RX ORDER — NALOXONE HCL 0.4 MG/ML
0.02 VIAL (ML) INJECTION
Status: DISCONTINUED | OUTPATIENT
Start: 2024-07-21 | End: 2024-07-22 | Stop reason: HOSPADM

## 2024-07-21 RX ADMIN — ACETAMINOPHEN 650 MG: 325 TABLET ORAL at 09:07

## 2024-07-21 RX ADMIN — ENOXAPARIN SODIUM 80 MG: 80 INJECTION SUBCUTANEOUS at 10:07

## 2024-07-21 RX ADMIN — CEFTRIAXONE SODIUM 1 G: 1 INJECTION, POWDER, FOR SOLUTION INTRAMUSCULAR; INTRAVENOUS at 05:07

## 2024-07-21 RX ADMIN — ENOXAPARIN SODIUM 80 MG: 80 INJECTION SUBCUTANEOUS at 11:07

## 2024-07-21 RX ADMIN — HEPARIN SODIUM 12 UNITS/KG/HR: 10000 INJECTION, SOLUTION INTRAVENOUS at 07:07

## 2024-07-21 RX ADMIN — IOHEXOL 100 ML: 350 INJECTION, SOLUTION INTRAVENOUS at 05:07

## 2024-07-21 RX ADMIN — KETOROLAC TROMETHAMINE 15 MG: 30 INJECTION, SOLUTION INTRAMUSCULAR at 05:07

## 2024-07-21 NOTE — ED PROVIDER NOTES
Encounter Date: 7/21/2024       History     Chief Complaint   Patient presents with    Shoulder Pain     On right side to right arm and side      HPI 39-year-old man presents emergency department for evaluation acute onset pain to his right flank radiating to his right shoulder.  Worse with taking a deep breath.  Recent left ureteral stent, history of kidney stones.  Has been having some burning with urination.  No fever.  History of pleurisy/pneumonia in the past.  Review of patient's allergies indicates:   Allergen Reactions    Aspirin Palpitations and Shortness Of Breath    Hydrocodone     Penicillins      Past Medical History:   Diagnosis Date    Nephrolithiasis      Past Surgical History:   Procedure Laterality Date    CYSTOURETEROSCOPY, WITH HOLMIUM LASER LITHOTRIPSY OF URETERAL CALCULUS AND STENT INSERTION Left 7/5/2024    Procedure: CYSTOURETEROSCOPY, WITH HOLMIUM LASER LITHOTRIPSY OF URETERAL CALCULUS AND STENT INSERTION;  Surgeon: Milana Encinas MD;  Location: [facility];  Service: Urology;  Laterality: Left;    URETEROSCOPIC REMOVAL OF URETERIC CALCULUS N/A 7/5/2024    Procedure: REMOVAL, CALCULUS, URETER, URETEROSCOPIC;  Surgeon: Milana Encinas MD;  Location: [facility];  Service: Urology;  Laterality: N/A;     Family History   Problem Relation Name Age of Onset    Migraines Mother      Hypertension Mother      Diabetes Father      Diabetes Paternal Aunt      Sarcoidosis Paternal Aunt      Diabetes Paternal Grandfather      Emphysema Maternal Grandfather      COPD Maternal Grandfather       Social History     Tobacco Use    Smoking status: Never    Smokeless tobacco: Never   Substance Use Topics    Alcohol use: Never    Drug use: Never     Review of Systems   Constitutional:  Negative for fever.   HENT:  Negative for sore throat.    Respiratory:  Negative for shortness of breath.    Cardiovascular:  Negative for chest pain.   Gastrointestinal:  Negative for nausea.   Genitourinary:  Positive for  dysuria, flank pain and hematuria.   Musculoskeletal:  Negative for back pain.   Skin:  Negative for rash.   Neurological:  Negative for weakness.   Hematological:  Does not bruise/bleed easily.       Physical Exam     Initial Vitals [07/21/24 0328]   BP Pulse Resp Temp SpO2   126/84 (!) 136 18 98.4 °F (36.9 °C) 96 %      MAP       --         Physical Exam    Constitutional: Vital signs are normal. He appears well-developed and well-nourished.  Non-toxic appearance. No distress.   HENT:   Head: Normocephalic and atraumatic.   Eyes: EOM are normal. Pupils are equal, round, and reactive to light.   Neck: Neck supple. No JVD present.   Normal range of motion.  Cardiovascular:  Regular rhythm, normal heart sounds and intact distal pulses.   Tachycardia present.   Exam reveals no gallop and no friction rub.       No murmur heard.  Pulmonary/Chest: Breath sounds normal. He has no wheezes. He has no rhonchi. He has no rales.   Abdominal: Abdomen is soft. Bowel sounds are normal. There is no abdominal tenderness.   No right CVA tenderness.  No left CVA tenderness. There is no rebound, no guarding and negative Nelson's sign.   Musculoskeletal:         General: Normal range of motion.      Cervical back: Normal range of motion and neck supple. No rigidity.     Neurological: He is alert and oriented to person, place, and time. He has normal strength and normal reflexes. No cranial nerve deficit or sensory deficit. He exhibits normal muscle tone. Coordination normal. GCS eye subscore is 4. GCS verbal subscore is 5. GCS motor subscore is 6.   Skin: Skin is warm and dry.   Psychiatric: He has a normal mood and affect. His speech is normal and behavior is normal. He is not actively hallucinating.         ED Course   Procedures  Labs Reviewed   CBC W/ AUTO DIFFERENTIAL - Abnormal       Result Value    WBC 8.37      RBC 5.09      Hemoglobin 15.3      Hematocrit 45.9      MCV 90      MCH 30.1      MCHC 33.3      RDW 11.8       Platelets 275      MPV 9.1 (*)     Immature Granulocytes 0.4      Gran # (ANC) 6.5      Immature Grans (Abs) 0.03      Lymph # 1.1      Mono # 0.6      Eos # 0.1      Baso # 0.05      nRBC 0      Gran % 77.0 (*)     Lymph % 12.8 (*)     Mono % 7.6      Eosinophil % 1.6      Basophil % 0.6      Differential Method Automated     COMPREHENSIVE METABOLIC PANEL - Abnormal    Sodium 140      Potassium 3.8      Chloride 105      CO2 22 (*)     Glucose 122 (*)     BUN 12      Creatinine 1.2      Calcium 9.8      Total Protein 7.3      Albumin 4.1      Total Bilirubin 0.4      Alkaline Phosphatase 67      AST 12      ALT 19      eGFR >60      Anion Gap 13     URINALYSIS, REFLEX TO URINE CULTURE - Abnormal    Specimen UA Urine, Clean Catch      Color, UA Orange (*)     Appearance, UA Hazy (*)     pH, UA 6.0      Specific Gravity, UA 1.020      Protein, UA 2+ (*)     Glucose, UA Negative      Ketones, UA Negative      Bilirubin (UA) Negative      Occult Blood UA 3+ (*)     Nitrite, UA Negative      Urobilinogen, UA Negative      Leukocytes, UA 3+ (*)     Narrative:     Specimen Source->Urine   D DIMER, QUANTITATIVE - Abnormal    D-Dimer 1.01 (*)     Narrative:      DDimer  critical result(s) called and verbal readback obtained from   Nola Tobar RN ED by [facility] 07/21/2024 04:53   URINALYSIS MICROSCOPIC - Abnormal    RBC, UA >100 (*)     WBC, UA >100 (*)     Bacteria Many (*)     Hyaline Casts, UA 0      Unclass Karena UA 3      Microscopic Comment SEE COMMENT      Narrative:     Specimen Source->Urine   CBC W/ AUTO DIFFERENTIAL - Abnormal    WBC 8.60      RBC 5.34      Hemoglobin 16.0      Hematocrit 47.6      MCV 89      MCH 30.0      MCHC 33.6      RDW 11.9      Platelets 288      MPV 9.3      Immature Granulocytes 0.3      Gran # (ANC) 6.4      Immature Grans (Abs) 0.03      Lymph # 1.3      Mono # 0.7      Eos # 0.1      Baso # 0.07      nRBC 0      Gran % 74.9 (*)     Lymph % 15.0 (*)     Mono % 7.6      Eosinophil % 1.4       Basophil % 0.8      Differential Method Automated      Narrative:     Draw baseline aPTT prior to starting the heparin bolus or  infusion  (if patient is on warfarin prior to heparin therapy)   CULTURE, URINE   APTT    aPTT 30.0      Narrative:     Draw baseline aPTT prior to starting the heparin bolus or  infusion  (if patient is on warfarin prior to heparin therapy)   PROTIME-INR    Prothrombin Time 10.8      INR 1.0      Narrative:     Draw baseline aPTT prior to starting the heparin bolus or  infusion  (if patient is on warfarin prior to heparin therapy)   LACTIC ACID, PLASMA    Lactate (Lactic Acid) 1.0     PROCALCITONIN    Procalcitonin 0.05     ISTAT LACTATE    POC Lactate 1.12      Sample VENOUS      Site Other      Allens Test N/A       EKG Readings: (Independently Interpreted)   Sinus tachycardia, 115 beats per minute with normal T-waves, normal ST segments, otherwise normal EKG.       Imaging Results              US Lower Extremity Veins Bilateral (Final result)  Result time 07/21/24 09:32:00      Final result by Isidro Gee Jr., MD (07/21/24 09:32:00)                   Impression:      No evidence of deep venous thrombosis in either lower extremity.      Electronically signed by: Isidro Gee MD  Date:    07/21/2024  Time:    09:32               Narrative:    EXAMINATION:  US LOWER EXTREMITY VEINS BILATERAL    CLINICAL HISTORY:  PE;    TECHNIQUE:  Duplex and color flow Doppler and dynamic compression was performed of the bilateral lower extremity veins was performed.    COMPARISON:  None    FINDINGS:  Right thigh veins: The common femoral, femoral, popliteal, upper greater saphenous, and deep femoral veins are patent and free of thrombus. The veins are normally compressible and have normal phasic flow and augmentation response.    Right calf veins: The visualized calf veins are patent.    Left thigh veins: The common femoral, femoral, popliteal, upper greater saphenous, and deep femoral  veins are patent and free of thrombus. The veins are normally compressible and have normal phasic flow and augmentation response.    Left calf veins: The visualized calf veins are patent.    Miscellaneous: None                                       CTA Chest Non-Coronary (PE Studies) (Final result)  Result time 07/21/24 08:32:39      Final result by Isidro Gee Jr., MD (07/21/24 08:32:39)                   Impression:      Probable small tertiary artery pulmonary emboli bilaterally.  Bilateral dependent atelectasis.  Small area of atelectasis in the left lingula.      Electronically signed by: Isidro Gee MD  Date:    07/21/2024  Time:    08:32               Narrative:    EXAMINATION:  CTA CHEST NON CORONARY (PE STUDIES)    CLINICAL HISTORY:  Pulmonary embolism (PE) suspected, positive D-dimer;    TECHNIQUE:  Low dose axial images, sagittal and coronal reformations were obtained from the thoracic inlet to the lung bases following the IV administration of 100 mL of Omnipaque 350.  Contrast timing was optimized to evaluate the pulmonary arteries.  MIP images were performed.    COMPARISON:  CTA chest of November 6, 2019.    FINDINGS:  There are small vessels in both lower lobes showing probable tertiary pulmonary emboli.  Larger emboli in the main pulmonary arteries are not seen however.  The heart and great vessels are of normal size and contour.  Dissection or aneurysm is not seen.    There is a small area of atelectasis in the left lingula.  Other intrapulmonary masses are not seen.  There is mild bilateral dependent atelectasis.  No pneumothorax or pleural effusion is noted.                                       CT Abdomen Pelvis With IV Contrast NO Oral Contrast (Final result)  Result time 07/21/24 08:36:16      Final result by Isidro Gee Jr., MD (07/21/24 08:36:16)                   Impression:      Double pigtail stent in place on the left from the kidney to the bladder without residual  hydronephrosis.      Electronically signed by: Isidro Gee MD  Date:    07/21/2024  Time:    08:36               Narrative:    EXAMINATION:  CT ABDOMEN PELVIS WITH IV CONTRAST    CLINICAL HISTORY:  Abdominal abscess/infection suspected;Flank pain, kidney stone suspected;    TECHNIQUE:  Low dose axial images, sagittal and coronal reformations were obtained from the lung bases to the pubic symphysis following the IV administration of 100 mL of Omnipaque 350    COMPARISON:  CT abdomen pelvis of June 24, 2024.    FINDINGS:  The liver is of normal size contour and CT density without focal defect.  The gallbladder is of normal size without CT evidence of stone.  The pancreas is of normal contour and CT density without edema or mass.  The spleen is of normal size and CT density.  A small accessory spleen is noted.    The adrenal glands are not enlarged.  The kidneys are of normal size contour and contrast enhancement.  On the left there is a double pigtail stent which extends from the renal pelvis to the bladder without hydronephrosis.  A left ureteral stone is not presently seen.  Hydronephrosis on the right is not seen.  The abdominal aorta and inferior vena cava are of normal caliber.    The stomach is of normal configuration.  Small bowel dilatation or air-fluid levels are not seen.  The colon appears of normal configuration without distention or mass.  A normal appendix is noted.  Free fluid or free air is not seen.    The bladder is of normal contour without mass or asymmetry.  The prostate is not enlarged.                                       X-Ray Chest AP Portable (Final result)  Result time 07/21/24 08:43:59      Final result by Isidro Gee Jr., MD (07/21/24 08:43:59)                   Impression:      No acute abnormality.      Electronically signed by: Isidro Gee MD  Date:    07/21/2024  Time:    08:43               Narrative:    EXAMINATION:  XR CHEST AP PORTABLE    CLINICAL  HISTORY:  Sepsis;    TECHNIQUE:  Single frontal view of the chest was performed.    COMPARISON:  Chest x-ray of November 15, 2019    FINDINGS:  The lungs are clear, with normal appearance of pulmonary vasculature and no pleural effusion or pneumothorax.    The cardiac silhouette is normal in size. The hilar and mediastinal contours are unremarkable.    Bones are intact.                                       Medications   sodium chloride 0.9% flush 10 mL (has no administration in time range)   naloxone 0.4 mg/mL injection 0.02 mg (has no administration in time range)   potassium bicarbonate disintegrating tablet 50 mEq (has no administration in time range)   potassium bicarbonate disintegrating tablet 35 mEq (has no administration in time range)   potassium bicarbonate disintegrating tablet 60 mEq (has no administration in time range)   magnesium oxide tablet 800 mg (has no administration in time range)   magnesium oxide tablet 800 mg (has no administration in time range)   potassium, sodium phosphates 280-160-250 mg packet 2 packet (has no administration in time range)   potassium, sodium phosphates 280-160-250 mg packet 2 packet (has no administration in time range)   potassium, sodium phosphates 280-160-250 mg packet 2 packet (has no administration in time range)   acetaminophen tablet 650 mg (650 mg Oral Given 7/21/24 0906)   senna-docusate 8.6-50 mg per tablet 1 tablet (has no administration in time range)   ondansetron injection 4 mg (has no administration in time range)   albuterol-ipratropium 2.5 mg-0.5 mg/3 mL nebulizer solution 3 mL (has no administration in time range)   melatonin tablet 6 mg (has no administration in time range)   aluminum-magnesium hydroxide-simethicone 200-200-20 mg/5 mL suspension 30 mL (has no administration in time range)   tamsulosin 24 hr capsule 0.4 mg (has no administration in time range)   traMADoL tablet 50 mg (has no administration in time range)   phenazopyridine tablet 200  mg (has no administration in time range)   ketorolac injection 15 mg (15 mg Intravenous Given 7/21/24 3669)   enoxaparin injection 80 mg (80 mg Subcutaneous Given 7/21/24 1024)   iohexoL (OMNIPAQUE 350) 350 mg iodine/mL injection (100 mLs Intravenous Given 7/21/24 0519)   cefTRIAXone (Rocephin) 1 g in D5W 100 mL IVPB (MB+) (0 g Intravenous Stopped 7/21/24 0615)     Medical Decision Making  39-year-old man presents emergency department for evaluation acute onset pain to his right flank radiating to his right shoulder.  Worse with taking a deep breath.  Recent left ureteral stent, history of kidney stones.  Has been having some burning with urination.  No fever.  Tachycardic on examination.  Differential diagnosis includes pyelonephritis, acute pulmonary embolism, pneumothorax, pneumonia, perinephric abscess.  Chest x-ray interpreted myself shows no evidence of pneumothorax or pneumonia.  D-dimer elevated at 1 with a CTA PE study showing bilateral pulmonary embolisms.  Patient with renal stents and hematuria with UA showing many bacteria, nitrite negative, greater than 100 white blood cells and greater than 100 red blood cells.  Will start on minimal dose heparin given active hematuria.  Patient is not anemic with a normal hemoglobin 15.3 white blood cell of 8.3.  Patient covered with Rocephin possible urinary tract infection although this may just be due to the stent.  Discussed Hospital Medicine.  Patient is stable condition.  They will admit for further treatment.    Amount and/or Complexity of Data Reviewed  Labs: ordered.  Radiology: ordered.    Risk  Decision regarding hospitalization.                                      Clinical Impression:  Final diagnoses:  [R00.0] Tachycardia  [I26.99] Acute pulmonary embolism without acute cor pulmonale, unspecified pulmonary embolism type (Primary)          ED Disposition Condition    Admit Stable                Amadou Benoit MD  07/21/24 2579

## 2024-07-21 NOTE — HPI
Mr. Melvin is a 39-year-old male with a past medical history of nephrolithiasis and is status post left ureteral stent placement on 07/05/2024 per Dr. Encinas.  He presented to the ED last night for complaints of left flank pain.  While in the ED, he was found to be tachycardic in his D-dimer came back elevated.  He had a chest CTA which was positive bilateral PE.  He was started on a heparin infusion.  He also had a CT of abdomen/pelvis with IV contrast which showed the left ureteral stent to be stable without residual hydronephrosis and a nonobstructing right renal stone.  He was also found to have a UTI and started on IV Rocephin.  A culture was collected. He reports having a recent episode at home where he felt like he was going to pass out.  He also gets a fluttering on the left side of his neck intermittently.  This started after he was prescribed BuSpar, which she has since stopped taking, however, this fluttering persist.  He also feels like he has trouble swallowing.  His spouse is at bedside endorses talking with Dr. Encinas today about getting the ureteral stent removed while he is here.  It was scheduled to come out on 07/30.  He reports the pain to be significant and has not taken anything other than Tylenol.  He has not tried the tramadol.  He was also been having urinary burning in his urine has been dark. He currently denies dizziness, shortness of breath, chest pain, nausea/vomiting. He is being admitted for further treatment and management of his condition.

## 2024-07-21 NOTE — SIGNIFICANT EVENT
Latest Reference Range & Units 07/21/24 04:20   Sample  VENOUS   Site  Other   POC Lactate 0.5 - 2.2 mmol/L 1.12

## 2024-07-21 NOTE — PLAN OF CARE
"[facility] - Med/Surg  Initial Discharge Assessment       Primary Care Provider: Mumtaz Fountain FNP-C    Admission Diagnosis: Acute pulmonary embolism without acute cor pulmonale, unspecified pulmonary embolism type [I26.99]    Admission Date: 7/21/2024  Expected Discharge Date:     Transition of Care Barriers: None    Initial assessment completed at bedside with pt and spouse,Estela. Pt confirmed demographics, PCP and pharmacy. Pt manages medical care independently. Pt anticipates discharge home when medically clear. Pt denies discharge needs.    Payor: BLUE CROSS BLUE [employer]IELD / Plan: [employer] EM[employer] [place] / Product Type: Commercial /     Extended Emergency Contact Information  Primary Emergency Contact: Estela Melvin  Address: [address] [place] [address] [place]  Mobile Phone: [phone]  Relation: Spouse    Discharge Plan A: Home  Discharge Plan B: Home      [facility] - [place], [place] - [address]  [address]  [facility] [address]  Phone: [phone] Fax: [phone]      Initial Assessment (most recent)       Adult Discharge Assessment - 07/21/24 1057          Discharge Assessment    Assessment Type Discharge Planning Assessment     Confirmed/corrected address, phone number and insurance Yes     Confirmed Demographics Correct on Facesheet     Source of Information patient;family     When was your last doctors appointment? 06/03/24     Reason For Admission "severe shoulder pain, right lower side and heart"     People in Home spouse     Facility Arrived From: home     Do you expect to return to your current living situation? Yes     Do you have help at home or someone to help you manage your care at home? Yes     Who are your caregiver(s) and their phone number(s)? Estela (spouse)     Prior to hospitilization cognitive status: Alert/Oriented     Current cognitive status: Alert/Oriented     Walking or Climbing Stairs Difficulty no "     Dressing/Bathing Difficulty no     Equipment Currently Used at Home none     Readmission within 30 days? No     Patient currently being followed by outpatient case management? No     Do you currently have service(s) that help you manage your care at home? No     Do you take prescription medications? Yes     Do you have prescription coverage? Yes     Coverage BCBS     Do you have any problems affording any of your prescribed medications? No     Is the patient taking medications as prescribed? yes     Who is going to help you get home at discharge? Estela     How do you get to doctors appointments? car, drives self     Are you on dialysis? No     Do you take coumadin? No     Discharge Plan A Home     Discharge Plan B Home     DME Needed Upon Discharge  none     Discharge Plan discussed with: Spouse/sig other;Patient     Transition of Care Barriers None        Physical Activity    On average, how many days per week do you engage in moderate to strenuous exercise (like a brisk walk)? 3 days     On average, how many minutes do you engage in exercise at this level? 20 min        Financial Resource Strain    How hard is it for you to pay for the very basics like food, housing, medical care, and heating? Hard        Housing Stability    In the last 12 months, was there a time when you were not able to pay the mortgage or rent on time? Yes     At any time in the past 12 months, were you homeless or living in a shelter (including now)? No        Transportation Needs    Has the lack of transportation kept you from medical appointments, meetings, work or from getting things needed for daily living? No        Food Insecurity    Within the past 12 months, you worried that your food would run out before you got the money to buy more. Never true     Within the past 12 months, the food you bought just didn't last and you didn't have money to get more. Never true        Stress    Do you feel stress - tense, restless, nervous, or  anxious, or unable to sleep at night because your mind is troubled all the time - these days? Very much        Social Isolation    How often do you feel lonely or isolated from those around you?  Never        Alcohol Use    Q1: How often do you have a drink containing alcohol? Never     Q2: How many drinks containing alcohol do you have on a typical day when you are drinking? Patient does not drink     Q3: How often do you have six or more drinks on one occasion? Never        Utilities    In the past 12 months has the electric, gas, oil, or water company threatened to shut off services in your home? No        Health Literacy    How often do you need to have someone help you when you read instructions, pamphlets, or other written material from your doctor or pharmacy? Never

## 2024-07-21 NOTE — ASSESSMENT & PLAN NOTE
Reportedly after starting BuSpar, no episodes since discontinuing the medication   Obtain echocardiogram and carotid ultrasound  Monitor

## 2024-07-21 NOTE — ASSESSMENT & PLAN NOTE
Currently sinus tachycardia on telemetry, likely due to PE  Patient with underlying anxiety, and may also be contributory to sensation  Monitor on telemetry

## 2024-07-21 NOTE — ED TRIAGE NOTES
Yovani Melvin is here with right neck to shoulder to right side pain and heart rate 'issues', watch saying HR over 100 for extended time.

## 2024-07-21 NOTE — SUBJECTIVE & OBJECTIVE
Past Medical History:   Diagnosis Date    Nephrolithiasis        Past Surgical History:   Procedure Laterality Date    CYSTOURETEROSCOPY, WITH HOLMIUM LASER LITHOTRIPSY OF URETERAL CALCULUS AND STENT INSERTION Left 7/5/2024    Procedure: CYSTOURETEROSCOPY, WITH HOLMIUM LASER LITHOTRIPSY OF URETERAL CALCULUS AND STENT INSERTION;  Surgeon: Milana Encinas MD;  Location: [facility];  Service: Urology;  Laterality: Left;    URETEROSCOPIC REMOVAL OF URETERIC CALCULUS N/A 7/5/2024    Procedure: REMOVAL, CALCULUS, URETER, URETEROSCOPIC;  Surgeon: Milana Encinas MD;  Location: [facility];  Service: Urology;  Laterality: N/A;       Review of patient's allergies indicates:   Allergen Reactions    Aspirin Palpitations and Shortness Of Breath    Hydrocodone     Penicillins        No current facility-administered medications on file prior to encounter.     Current Outpatient Medications on File Prior to Encounter   Medication Sig    ketorolac (TORADOL) 10 mg tablet Take 10 mg by mouth every 6 (six) hours.    ondansetron (ZOFRAN-ODT) 4 MG TbDL Take 1 tablet (4 mg total) by mouth every 8 (eight) hours as needed (nausea/vomiting).    tamsulosin (FLOMAX) 0.4 mg Cap Take 1 capsule (0.4 mg total) by mouth once daily.    traMADoL (ULTRAM) 50 mg tablet Take 1 tablet (50 mg total) by mouth every 6 (six) hours as needed for Pain.     Family History       Problem Relation (Age of Onset)    COPD Maternal Grandfather    Diabetes Father, Paternal Aunt, Paternal Grandfather    Emphysema Maternal Grandfather    Hypertension Mother    Migraines Mother    Sarcoidosis Paternal Aunt          Tobacco Use    Smoking status: Never    Smokeless tobacco: Never   Substance and Sexual Activity    Alcohol use: Never    Drug use: Never    Sexual activity: Not on file     Review of Systems   Cardiovascular:  Positive for palpitations.        Feels flutter in neck   Gastrointestinal:  Positive for abdominal pain (stent area).   Genitourinary:  Positive  for dysuria and flank pain.        Urine dark   Musculoskeletal:  Positive for myalgias.   Neurological:  Positive for light-headedness (episode of pre-syncope).   Psychiatric/Behavioral:  The patient is nervous/anxious.      Objective:     Vital Signs (Most Recent):  Temp: 98.4 °F (36.9 °C) (07/21/24 0328)  Pulse: 106 (07/21/24 0603)  Resp: 18 (07/21/24 0328)  BP: 125/89 (07/21/24 0603)  SpO2: 95 % (07/21/24 0603) Vital Signs (24h Range):  Temp:  [98.4 °F (36.9 °C)] 98.4 °F (36.9 °C)  Pulse:  [106-136] 106  Resp:  [18] 18  SpO2:  [95 %-96 %] 95 %  BP: (125-126)/(84-89) 125/89     Weight: 77.1 kg (170 lb)  Body mass index is 25.85 kg/m².     Physical Exam  Constitutional:       General: He is not in acute distress.  HENT:      Head: Normocephalic and atraumatic.   Eyes:      Conjunctiva/sclera: Conjunctivae normal.      Pupils: Pupils are equal, round, and reactive to light.   Cardiovascular:      Rate and Rhythm: Regular rhythm. Tachycardia present.      Heart sounds: Normal heart sounds.   Pulmonary:      Effort: Pulmonary effort is normal. No respiratory distress.      Breath sounds: Normal breath sounds.   Abdominal:      General: Bowel sounds are normal. There is no distension.      Palpations: Abdomen is soft.      Tenderness: There is no abdominal tenderness.   Musculoskeletal:         General: No swelling. Normal range of motion.      Cervical back: Normal range of motion and neck supple.   Skin:     General: Skin is warm and dry.      Capillary Refill: Capillary refill takes less than 2 seconds.   Neurological:      General: No focal deficit present.      Mental Status: He is alert and oriented to person, place, and time.              CRANIAL NERVES     CN III, IV, VI   Pupils are equal, round, and reactive to light.       Significant Labs: All pertinent labs within the past 24 hours have been reviewed.  CBC:   Recent Labs   Lab 07/21/24  0428 07/21/24  0716   WBC 8.37 8.60   HGB 15.3 16.0   HCT 45.9 47.6     288     CMP:   Recent Labs   Lab 07/21/24 0427      K 3.8      CO2 22*   *   BUN 12   CREATININE 1.2   CALCIUM 9.8   PROT 7.3   ALBUMIN 4.1   BILITOT 0.4   ALKPHOS 67   AST 12   ALT 19   ANIONGAP 13     Coagulation:   Recent Labs   Lab 07/21/24  0716   INR 1.0   APTT 30.0     Urine Studies:   Recent Labs   Lab 07/21/24 0433   COLORU [place]*   APPEARANCEUA Hazy*   PHUR 6.0   SPECGRAV 1.020   PROTEINUA 2+*   GLUCUA Negative   KETONESU Negative   BILIRUBINUA Negative   OCCULTUA 3+*   NITRITE Negative   UROBILINOGEN Negative   LEUKOCYTESUR 3+*   RBCUA >100*   WBCUA >100*   BACTERIA Many*   HYALINECASTS 0     Microbiology Results (last 7 days)       Procedure Component Value Units Date/Time    Urine culture [[id number]] Collected: 07/21/24 0433    Order Status: No result Specimen: Urine Updated: 07/21/24 0508    Blood culture x two cultures. Draw prior to antibiotics. [[id number]] Collected: 07/21/24 0427    Order Status: Sent Specimen: Blood from Peripheral, Hand, Right     Blood culture x two cultures. Draw prior to antibiotics. [[id number]] Collected: 07/21/24 0427    Order Status: Sent Specimen: Blood from Peripheral, Antecubital, Left                 Significant Imaging: I have reviewed all pertinent imaging results/findings within the past 24 hours.

## 2024-07-21 NOTE — NURSING
Nurses Note -- 4 Eyes      7/21/2024   10:46 AM      Skin assessed during: Admit      [x] No Altered Skin Integrity Present    [x]Prevention Measures Documented      [] Yes- Altered Skin Integrity Present or Discovered   [] LDA Added if Not in Epic (Describe Wound)   [] New Altered Skin Integrity was Present on Admit and Documented in LDA   [] Wound Image Taken    Wound Care Consulted? No    Attending Nurse:  Anny Baig RN/Staff Member:  Sherie

## 2024-07-21 NOTE — H&P
[facility] Medicine  History & Physical    Patient Name: Yovani Melvin  MRN: [id number]  Patient Class: IP- Inpatient  Admission Date: 7/21/2024  Attending Physician: Galdino Hurst MD   Primary Care Provider: Mumtaz Fountain FNP-C         Patient information was obtained from patient, past medical records, and ER records.     Subjective:     Principal Problem:Bilateral pulmonary embolism    Chief Complaint:   Chief Complaint   Patient presents with    Shoulder Pain     On right side to right arm and side         HPI: Mr. Melvin is a 39-year-old male with a past medical history of nephrolithiasis and is status post left ureteral stent placement on 07/05/2024 per Dr. Encinas.  He presented to the ED last night for complaints of left flank pain.  While in the ED, he was found to be tachycardic in his D-dimer came back elevated.  He had a chest CTA which was positive bilateral PE.  He was started on a heparin infusion.  He also had a CT of abdomen/pelvis with IV contrast which showed the left ureteral stent to be stable without residual hydronephrosis and a nonobstructing right renal stone.  He was also found to have a UTI and started on IV Rocephin.  A culture was collected. He reports having a recent episode at home where he felt like he was going to pass out.  He also gets a fluttering on the left side of his neck intermittently.  This started after he was prescribed BuSpar, which she has since stopped taking, however, this fluttering persist.  He also feels like he has trouble swallowing.  His spouse is at bedside endorses talking with Dr. Encinas today about getting the ureteral stent removed while he is here.  It was scheduled to come out on 07/30.  He reports the pain to be significant and has not taken anything other than Tylenol.  He has not tried the tramadol.  He was also been having urinary burning in his urine has been dark. He currently denies dizziness, shortness of  breath, chest pain, nausea/vomiting. He is being admitted for further treatment and management of his condition.    Past Medical History:   Diagnosis Date    Nephrolithiasis        Past Surgical History:   Procedure Laterality Date    CYSTOURETEROSCOPY, WITH HOLMIUM LASER LITHOTRIPSY OF URETERAL CALCULUS AND STENT INSERTION Left 7/5/2024    Procedure: CYSTOURETEROSCOPY, WITH HOLMIUM LASER LITHOTRIPSY OF URETERAL CALCULUS AND STENT INSERTION;  Surgeon: Milana Encinas MD;  Location: [facility];  Service: Urology;  Laterality: Left;    URETEROSCOPIC REMOVAL OF URETERIC CALCULUS N/A 7/5/2024    Procedure: REMOVAL, CALCULUS, URETER, URETEROSCOPIC;  Surgeon: Milana Encinas MD;  Location: [facility];  Service: Urology;  Laterality: N/A;       Review of patient's allergies indicates:   Allergen Reactions    Aspirin Palpitations and Shortness Of Breath    Hydrocodone     Penicillins        No current facility-administered medications on file prior to encounter.     Current Outpatient Medications on File Prior to Encounter   Medication Sig    ketorolac (TORADOL) 10 mg tablet Take 10 mg by mouth every 6 (six) hours.    ondansetron (ZOFRAN-ODT) 4 MG TbDL Take 1 tablet (4 mg total) by mouth every 8 (eight) hours as needed (nausea/vomiting).    tamsulosin (FLOMAX) 0.4 mg Cap Take 1 capsule (0.4 mg total) by mouth once daily.    traMADoL (ULTRAM) 50 mg tablet Take 1 tablet (50 mg total) by mouth every 6 (six) hours as needed for Pain.     Family History       Problem Relation (Age of Onset)    COPD Maternal Grandfather    Diabetes Father, Paternal Aunt, Paternal Grandfather    Emphysema Maternal Grandfather    Hypertension Mother    Migraines Mother    Sarcoidosis Paternal Aunt          Tobacco Use    Smoking status: Never    Smokeless tobacco: Never   Substance and Sexual Activity    Alcohol use: Never    Drug use: Never    Sexual activity: Not on file     Review of Systems   Cardiovascular:  Positive for palpitations.         Feels flutter in neck   Gastrointestinal:  Positive for abdominal pain (stent area).   Genitourinary:  Positive for dysuria and flank pain.        Urine dark   Musculoskeletal:  Positive for myalgias.   Neurological:  Positive for light-headedness (episode of pre-syncope).   Psychiatric/Behavioral:  The patient is nervous/anxious.      Objective:     Vital Signs (Most Recent):  Temp: 98.4 °F (36.9 °C) (07/21/24 0328)  Pulse: 106 (07/21/24 0603)  Resp: 18 (07/21/24 0328)  BP: 125/89 (07/21/24 0603)  SpO2: 95 % (07/21/24 0603) Vital Signs (24h Range):  Temp:  [98.4 °F (36.9 °C)] 98.4 °F (36.9 °C)  Pulse:  [106-136] 106  Resp:  [18] 18  SpO2:  [95 %-96 %] 95 %  BP: (125-126)/(84-89) 125/89     Weight: 77.1 kg (170 lb)  Body mass index is 25.85 kg/m².     Physical Exam  Constitutional:       General: He is not in acute distress.  HENT:      Head: Normocephalic and atraumatic.   Eyes:      Conjunctiva/sclera: Conjunctivae normal.      Pupils: Pupils are equal, round, and reactive to light.   Cardiovascular:      Rate and Rhythm: Regular rhythm. Tachycardia present.      Heart sounds: Normal heart sounds.   Pulmonary:      Effort: Pulmonary effort is normal. No respiratory distress.      Breath sounds: Normal breath sounds.   Abdominal:      General: Bowel sounds are normal. There is no distension.      Palpations: Abdomen is soft.      Tenderness: There is no abdominal tenderness.   Musculoskeletal:         General: No swelling. Normal range of motion.      Cervical back: Normal range of motion and neck supple.   Skin:     General: Skin is warm and dry.      Capillary Refill: Capillary refill takes less than 2 seconds.   Neurological:      General: No focal deficit present.      Mental Status: He is alert and oriented to person, place, and time.              CRANIAL NERVES     CN III, IV, VI   Pupils are equal, round, and reactive to light.       Significant Labs: All pertinent labs within the past 24 hours have been  reviewed.  CBC:   Recent Labs   Lab 07/21/24 0428 07/21/24 0716   WBC 8.37 8.60   HGB 15.3 16.0   HCT 45.9 47.6    288     CMP:   Recent Labs   Lab 07/21/24 0427      K 3.8      CO2 22*   *   BUN 12   CREATININE 1.2   CALCIUM 9.8   PROT 7.3   ALBUMIN 4.1   BILITOT 0.4   ALKPHOS 67   AST 12   ALT 19   ANIONGAP 13     Coagulation:   Recent Labs   Lab 07/21/24 0716   INR 1.0   APTT 30.0     Urine Studies:   Recent Labs   Lab 07/21/24 0433   COLORU [place]*   APPEARANCEUA Hazy*   PHUR 6.0   SPECGRAV 1.020   PROTEINUA 2+*   GLUCUA Negative   KETONESU Negative   BILIRUBINUA Negative   OCCULTUA 3+*   NITRITE Negative   UROBILINOGEN Negative   LEUKOCYTESUR 3+*   RBCUA >100*   WBCUA >100*   BACTERIA Many*   HYALINECASTS 0     Microbiology Results (last 7 days)       Procedure Component Value Units Date/Time    Urine culture [[id number]] Collected: 07/21/24 0433    Order Status: No result Specimen: Urine Updated: 07/21/24 0508    Blood culture x two cultures. Draw prior to antibiotics. [[id number]] Collected: 07/21/24 0427    Order Status: Sent Specimen: Blood from Peripheral, Hand, Right     Blood culture x two cultures. Draw prior to antibiotics. [[id number]] Collected: 07/21/24 0427    Order Status: Sent Specimen: Blood from Peripheral, Antecubital, Left                 Significant Imaging: I have reviewed all pertinent imaging results/findings within the past 24 hours.  Assessment/Plan:     * Bilateral pulmonary embolism  CTA (-) RV strain  Obtain venous US BLE an echocardiogram  Transition to full-dose Lovenox    Palpitations  Currently sinus tachycardia on telemetry, likely due to PE  Patient with underlying anxiety, and may also be contributory to sensation  Monitor on telemetry      Near syncope  Reportedly after starting BuSpar, no episodes since discontinuing the medication   Obtain echocardiogram and carotid ultrasound  Monitor      Acute cystitis with hematuria  Urine culture  pending  IV Rocephin  Await final culture      Ureteral stone  S/P L-ureteral stent 7/5/24 per Dr. Encinas  CT Abd/pelvis: Non obstructing R-renal stone. Left ureteral stent stable.  Pain management: Resume home Tramadol. Add IV Toradol prn.  Ureteral stent removal scheduled for 07/31/2024 outpatient        VTE Risk Mitigation (From admission, onward)           Ordered     enoxaparin injection 80 mg  Every 12 hours         07/21/24 0947     IP VTE HIGH RISK PATIENT  Once         07/21/24 0756     Place sequential compression device  Until discontinued         07/21/24 0756                                    Diana German NP  Department of Hospital Medicine  Formerly [facility]

## 2024-07-21 NOTE — ASSESSMENT & PLAN NOTE
S/P L-ureteral stent 7/5/24 per Dr. Encinas  CT Abd/pelvis: Non obstructing R-renal stone. Left ureteral stent stable.  Pain management: Resume home Tramadol. Add IV Toradol prn.  Ureteral stent removal scheduled for 07/31/2024 outpatient

## 2024-07-21 NOTE — HOSPITAL COURSE
Mr. Melvin was admitted for bilateral PE and UTI.  While here, his labs and vital signs were monitored and he was maintained on telemetry. He was initially placed on a heparin infusion, then transitioned to full-dose Lovenox injections.  A venous ultrasound of BLE was done and was negative for DVT.  His echocardiogram was unremarkable.  He reports having an episode of pre syncope, which he thinks was due to a medication, and a carotid ultrasound was done and was negative for stenosis.  He had no neuro deficits or loss of consciousness and so head imaging was not obtained. A urine culture was collected and he was placed on empiric IV Rocephin. He had a recent left ureteral stent which was placed and causing him significant pain, for which he was treated with IV Toradol PRN. His urologist, Dr. Encinas, was consulted and evaluated him.  He had a CT of the abdomen/pelvis w& w/o contrast and was negative for contrast extravasation. He has not had any presyncope symptoms or sensation of palpitations since being here.  He reports having no overnight events and has been ambulating in the room without issues.  His vital signs remained stable and tachycardia improved.  He did urinate a blood clot and have some hematuria during the night, which is expected due to the ureteral stent, and has had no other issues with urinating.  He has been transitioned to Eliquis today and will continue a course of cefdinir for his UTI.  His urine culture was negative so far.  He is being discharged to home today in stable condition. He will follow up with Urology on 07/25 for ureteral stent removal.  He will be referred to Hematology, however, he states that he will be moving to [place] soon and we will most likely wait and follow up with a hematologist there.  He did not have any adverse events while here.

## 2024-07-22 ENCOUNTER — HOSPITAL ENCOUNTER (OUTPATIENT)
Facility: HOSPITAL | Age: 39
Discharge: HOME | End: 2024-07-23
Attending: STUDENT IN AN ORGANIZED HEALTH CARE EDUCATION/TRAINING PROGRAM | Admitting: STUDENT IN AN ORGANIZED HEALTH CARE EDUCATION/TRAINING PROGRAM
Payer: COMMERCIAL

## 2024-07-22 VITALS
HEART RATE: 107 BPM | RESPIRATION RATE: 16 BRPM | WEIGHT: 169 LBS | DIASTOLIC BLOOD PRESSURE: 83 MMHG | HEIGHT: 68 IN | TEMPERATURE: 99 F | BODY MASS INDEX: 25.61 KG/M2 | SYSTOLIC BLOOD PRESSURE: 132 MMHG | OXYGEN SATURATION: 94 %

## 2024-07-22 DIAGNOSIS — R07.9 CHEST PAIN: ICD-10-CM

## 2024-07-22 PROBLEM — R00.2 PALPITATIONS: Status: RESOLVED | Noted: 2024-07-21 | Resolved: 2024-07-22

## 2024-07-22 PROBLEM — R55 NEAR SYNCOPE: Status: RESOLVED | Noted: 2024-07-21 | Resolved: 2024-07-22

## 2024-07-22 LAB
ALBUMIN SERPL BCP-MCNC: 4.6 G/DL (ref 3.5–5.2)
ALP SERPL-CCNC: 62 U/L (ref 55–135)
ALT SERPL W/O P-5'-P-CCNC: 17 U/L (ref 10–44)
ANION GAP SERPL CALC-SCNC: 10 MMOL/L (ref 8–16)
ANION GAP SERPL CALC-SCNC: 13 MMOL/L (ref 8–16)
AORTIC ROOT ANNULUS: 3.46 CM
AORTIC VALVE CUSP SEPERATION: 1.96 CM
APICAL FOUR CHAMBER EJECTION FRACTION: 56 %
APICAL TWO CHAMBER EJECTION FRACTION: 62 %
APTT PPP: 31.5 SEC (ref 21–32)
ASCENDING AORTA: 2.85 CM
AST SERPL-CCNC: 15 U/L (ref 10–40)
AV INDEX (PROSTH): 0.88
AV MEAN GRADIENT: 2 MMHG
AV PEAK GRADIENT: 3 MMHG
AV VALVE AREA BY VELOCITY RATIO: 3.36 CM²
AV VALVE AREA: 3.5 CM²
AV VELOCITY RATIO: 0.85
BASOPHILS # BLD AUTO: 0.06 K/UL (ref 0–0.2)
BASOPHILS # BLD AUTO: 0.06 K/UL (ref 0–0.2)
BASOPHILS NFR BLD: 0.9 % (ref 0–1.9)
BASOPHILS NFR BLD: 1 % (ref 0–1.9)
BILIRUB SERPL-MCNC: 0.4 MG/DL (ref 0.1–1)
BNP SERPL-MCNC: 3 PG/ML (ref 0–99)
BSA FOR ECHO PROCEDURE: 1.92 M2
BUN SERPL-MCNC: 12 MG/DL (ref 6–20)
BUN SERPL-MCNC: 15 MG/DL (ref 6–20)
CALCIUM SERPL-MCNC: 10.2 MG/DL (ref 8.7–10.5)
CALCIUM SERPL-MCNC: 9.5 MG/DL (ref 8.7–10.5)
CHLORIDE SERPL-SCNC: 103 MMOL/L (ref 95–110)
CHLORIDE SERPL-SCNC: 104 MMOL/L (ref 95–110)
CO2 SERPL-SCNC: 22 MMOL/L (ref 23–29)
CO2 SERPL-SCNC: 24 MMOL/L (ref 23–29)
CREAT SERPL-MCNC: 1.2 MG/DL (ref 0.5–1.4)
CREAT SERPL-MCNC: 1.2 MG/DL (ref 0.5–1.4)
CV ECHO LV RWT: 0.57 CM
DIFFERENTIAL METHOD BLD: ABNORMAL
DIFFERENTIAL METHOD BLD: ABNORMAL
DOP CALC AO PEAK VEL: 0.84 M/S
DOP CALC AO VTI: 14.2 CM
DOP CALC LVOT AREA: 4 CM2
DOP CALC LVOT DIAMETER: 2.25 CM
DOP CALC LVOT PEAK VEL: 0.71 M/S
DOP CALC LVOT STROKE VOLUME: 49.68 CM3
DOP CALC MV VTI: 11.5 CM
DOP CALCLVOT PEAK VEL VTI: 12.5 CM
E WAVE DECELERATION TIME: 118.64 MSEC
E/A RATIO: 1.12
E/E' RATIO: 4 M/S
ECHO LV POSTERIOR WALL: 0.92 CM (ref 0.6–1.1)
EOSINOPHIL # BLD AUTO: 0.1 K/UL (ref 0–0.5)
EOSINOPHIL # BLD AUTO: 0.2 K/UL (ref 0–0.5)
EOSINOPHIL NFR BLD: 2.1 % (ref 0–8)
EOSINOPHIL NFR BLD: 2.9 % (ref 0–8)
ERYTHROCYTE [DISTWIDTH] IN BLOOD BY AUTOMATED COUNT: 12 % (ref 11.5–14.5)
ERYTHROCYTE [DISTWIDTH] IN BLOOD BY AUTOMATED COUNT: 12 % (ref 11.5–14.5)
EST. GFR  (NO RACE VARIABLE): >60 ML/MIN/1.73 M^2
EST. GFR  (NO RACE VARIABLE): >60 ML/MIN/1.73 M^2
FRACTIONAL SHORTENING: 31 % (ref 28–44)
GLUCOSE SERPL-MCNC: 103 MG/DL (ref 70–110)
GLUCOSE SERPL-MCNC: 111 MG/DL (ref 70–110)
HCT VFR BLD AUTO: 47.6 % (ref 40–54)
HCT VFR BLD AUTO: 50.7 % (ref 40–54)
HGB BLD-MCNC: 15.8 G/DL (ref 14–18)
HGB BLD-MCNC: 16.4 G/DL (ref 14–18)
IMM GRANULOCYTES # BLD AUTO: 0.02 K/UL (ref 0–0.04)
IMM GRANULOCYTES # BLD AUTO: 0.02 K/UL (ref 0–0.04)
IMM GRANULOCYTES NFR BLD AUTO: 0.3 % (ref 0–0.5)
IMM GRANULOCYTES NFR BLD AUTO: 0.3 % (ref 0–0.5)
INR PPP: 1 (ref 0.8–1.2)
INTERVENTRICULAR SEPTUM: 0.91 CM (ref 0.6–1.1)
IVRT: 133.21 MSEC
LA MAJOR: 4.25 CM
LEFT ATRIUM AREA SYSTOLIC (APICAL 2 CHAMBER): 13.26 CM2
LEFT ATRIUM AREA SYSTOLIC (APICAL 4 CHAMBER): 10.87 CM2
LEFT ATRIUM VOLUME INDEX MOD: 15 ML/M2
LEFT ATRIUM VOLUME MOD: 28.49 CM3
LEFT INTERNAL DIMENSION IN SYSTOLE: 2.22 CM (ref 2.1–4)
LEFT VENTRICLE DIASTOLIC VOLUME INDEX: 21.78 ML/M2
LEFT VENTRICLE DIASTOLIC VOLUME: 41.38 ML
LEFT VENTRICLE END DIASTOLIC VOLUME APICAL 2 CHAMBER: 51.53 ML
LEFT VENTRICLE END DIASTOLIC VOLUME APICAL 4 CHAMBER: 54.24 ML
LEFT VENTRICLE END SYSTOLIC VOLUME APICAL 2 CHAMBER: 32.14 ML
LEFT VENTRICLE END SYSTOLIC VOLUME APICAL 4 CHAMBER: 22.36 ML
LEFT VENTRICLE MASS INDEX: 42 G/M2
LEFT VENTRICLE SYSTOLIC VOLUME INDEX: 8.7 ML/M2
LEFT VENTRICLE SYSTOLIC VOLUME: 16.61 ML
LEFT VENTRICULAR INTERNAL DIMENSION IN DIASTOLE: 3.21 CM (ref 3.5–6)
LEFT VENTRICULAR MASS: 79.6 G
LV LATERAL E/E' RATIO: 3.29 M/S
LV SEPTAL E/E' RATIO: 5.11 M/S
LVED V (TEICH): 41.38 ML
LVES V (TEICH): 16.61 ML
LVOT MG: 1.41 MMHG
LVOT MV: 0.57 CM/S
LYMPHOCYTES # BLD AUTO: 1.4 K/UL (ref 1–4.8)
LYMPHOCYTES # BLD AUTO: 1.6 K/UL (ref 1–4.8)
LYMPHOCYTES NFR BLD: 21.4 % (ref 18–48)
LYMPHOCYTES NFR BLD: 24 % (ref 18–48)
MAGNESIUM SERPL-MCNC: 2.3 MG/DL (ref 1.6–2.6)
MCH RBC QN AUTO: 29.7 PG (ref 27–31)
MCH RBC QN AUTO: 30.2 PG (ref 27–31)
MCHC RBC AUTO-ENTMCNC: 32.3 G/DL (ref 32–36)
MCHC RBC AUTO-ENTMCNC: 33.2 G/DL (ref 32–36)
MCV RBC AUTO: 91 FL (ref 82–98)
MCV RBC AUTO: 92 FL (ref 82–98)
MONOCYTES # BLD AUTO: 0.5 K/UL (ref 0.3–1)
MONOCYTES # BLD AUTO: 0.5 K/UL (ref 0.3–1)
MONOCYTES NFR BLD: 7.5 % (ref 4–15)
MONOCYTES NFR BLD: 8.2 % (ref 4–15)
MV A" WAVE DURATION": 102.76 MSEC
MV MEAN GRADIENT: 1 MMHG
MV PEAK A VEL: 0.41 M/S
MV PEAK E VEL: 0.46 M/S
MV PEAK GRADIENT: 1 MMHG
MV STENOSIS PRESSURE HALF TIME: 42.73 MS
MV VALVE AREA BY CONTINUITY EQUATION: 4.32 CM2
MV VALVE AREA P 1/2 METHOD: 5.15 CM2
NEUTROPHILS # BLD AUTO: 4.2 K/UL (ref 1.8–7.7)
NEUTROPHILS # BLD AUTO: 4.3 K/UL (ref 1.8–7.7)
NEUTROPHILS NFR BLD: 64.5 % (ref 38–73)
NEUTROPHILS NFR BLD: 66.9 % (ref 38–73)
NRBC BLD-RTO: 0 /100 WBC
NRBC BLD-RTO: 0 /100 WBC
OHS CV RV/LV RATIO: 1.1 CM
OHS LV EJECTION FRACTION SIMPSONS BIPLANE MOD: 61 %
PHOSPHATE SERPL-MCNC: 3.7 MG/DL (ref 2.7–4.5)
PISA MRMAX VEL: 4.1 M/S
PISA TR MAX VEL: 2.4 M/S
PLATELET # BLD AUTO: 287 K/UL (ref 150–450)
PLATELET # BLD AUTO: 329 K/UL (ref 150–450)
PMV BLD AUTO: 9 FL (ref 9.2–12.9)
PMV BLD AUTO: 9.1 FL (ref 9.2–12.9)
POTASSIUM SERPL-SCNC: 4.1 MMOL/L (ref 3.5–5.1)
POTASSIUM SERPL-SCNC: 4.3 MMOL/L (ref 3.5–5.1)
PROT SERPL-MCNC: 7.8 G/DL (ref 6–8.4)
PROTHROMBIN TIME: 11.1 SEC (ref 9–12.5)
PULM VEIN S/D RATIO: 1.22
PV MV: 0.54 M/S
PV PEAK D VEL: 0.37 M/S
PV PEAK GRADIENT: 2 MMHG
PV PEAK S VEL: 0.45 M/S
PV PEAK VELOCITY: 0.69 M/S
RA MAJOR: 4.2 CM
RA PRESSURE ESTIMATED: 3 MMHG
RBC # BLD AUTO: 5.24 M/UL (ref 4.6–6.2)
RBC # BLD AUTO: 5.52 M/UL (ref 4.6–6.2)
RIGHT VENTRICLE DIASTOLIC BASEL DIMENSION: 3.6 CM
RIGHT VENTRICLE DIASTOLIC LENGTH: 5.7 CM
RIGHT VENTRICULAR END-DIASTOLIC DIMENSION: 3.52 CM
RIGHT VENTRICULAR LENGTH IN DIASTOLE (APICAL 4-CHAMBER VIEW): 5.72 CM
RV TB RVSP: 5 MMHG
RV TISSUE DOPPLER FREE WALL SYSTOLIC VELOCITY 1 (APICAL 4 CHAMBER VIEW): 15.23 CM/S
SODIUM SERPL-SCNC: 137 MMOL/L (ref 136–145)
SODIUM SERPL-SCNC: 139 MMOL/L (ref 136–145)
STJ: 2.7 CM
TDI LATERAL: 0.14 M/S
TDI SEPTAL: 0.09 M/S
TDI: 0.12 M/S
TR MAX PG: 23 MMHG
TRICUSPID ANNULAR PLANE SYSTOLIC EXCURSION: 2.21 CM
TROPONIN I SERPL HS-MCNC: <2.3 PG/ML (ref 0–14.9)
TV REST PULMONARY ARTERY PRESSURE: 26 MMHG
WBC # BLD AUTO: 6.3 K/UL (ref 3.9–12.7)
WBC # BLD AUTO: 6.62 K/UL (ref 3.9–12.7)
Z-SCORE OF LEFT VENTRICULAR DIMENSION IN END DIASTOLE: -5.07
Z-SCORE OF LEFT VENTRICULAR DIMENSION IN END SYSTOLE: -3.1

## 2024-07-22 PROCEDURE — 80053 COMPREHEN METABOLIC PANEL: CPT | Performed by: NURSE PRACTITIONER

## 2024-07-22 PROCEDURE — 85025 COMPLETE CBC W/AUTO DIFF WBC: CPT | Mod: 91 | Performed by: NURSE PRACTITIONER

## 2024-07-22 PROCEDURE — 93010 ELECTROCARDIOGRAM REPORT: CPT | Mod: ,,, | Performed by: INTERNAL MEDICINE

## 2024-07-22 PROCEDURE — 85730 THROMBOPLASTIN TIME PARTIAL: CPT | Performed by: NURSE PRACTITIONER

## 2024-07-22 PROCEDURE — 25500020 PHARM REV CODE 255

## 2024-07-22 PROCEDURE — G0378 HOSPITAL OBSERVATION PER HR: HCPCS

## 2024-07-22 PROCEDURE — 99285 EMERGENCY DEPT VISIT HI MDM: CPT | Mod: 25

## 2024-07-22 PROCEDURE — 85025 COMPLETE CBC W/AUTO DIFF WBC: CPT | Performed by: EMERGENCY MEDICINE

## 2024-07-22 PROCEDURE — 83880 ASSAY OF NATRIURETIC PEPTIDE: CPT | Performed by: NURSE PRACTITIONER

## 2024-07-22 PROCEDURE — 85610 PROTHROMBIN TIME: CPT | Performed by: NURSE PRACTITIONER

## 2024-07-22 PROCEDURE — 83735 ASSAY OF MAGNESIUM: CPT | Performed by: NURSE PRACTITIONER

## 2024-07-22 PROCEDURE — 25000003 PHARM REV CODE 250: Performed by: NURSE PRACTITIONER

## 2024-07-22 PROCEDURE — 80048 BASIC METABOLIC PNL TOTAL CA: CPT | Performed by: NURSE PRACTITIONER

## 2024-07-22 PROCEDURE — 99223 1ST HOSP IP/OBS HIGH 75: CPT | Mod: ,,, | Performed by: STUDENT IN AN ORGANIZED HEALTH CARE EDUCATION/TRAINING PROGRAM

## 2024-07-22 PROCEDURE — 36415 COLL VENOUS BLD VENIPUNCTURE: CPT | Performed by: NURSE PRACTITIONER

## 2024-07-22 PROCEDURE — 84100 ASSAY OF PHOSPHORUS: CPT | Performed by: NURSE PRACTITIONER

## 2024-07-22 PROCEDURE — 93005 ELECTROCARDIOGRAM TRACING: CPT | Performed by: INTERNAL MEDICINE

## 2024-07-22 PROCEDURE — 84484 ASSAY OF TROPONIN QUANT: CPT | Performed by: NURSE PRACTITIONER

## 2024-07-22 RX ORDER — TAMSULOSIN HYDROCHLORIDE 0.4 MG/1
0.4 CAPSULE ORAL DAILY
Status: DISCONTINUED | OUTPATIENT
Start: 2024-07-23 | End: 2024-07-23 | Stop reason: HOSPADM

## 2024-07-22 RX ORDER — SODIUM,POTASSIUM PHOSPHATES 280-250MG
2 POWDER IN PACKET (EA) ORAL
Status: DISCONTINUED | OUTPATIENT
Start: 2024-07-23 | End: 2024-07-23 | Stop reason: HOSPADM

## 2024-07-22 RX ORDER — CEFDINIR 300 MG/1
300 CAPSULE ORAL EVERY 12 HOURS
Status: DISCONTINUED | OUTPATIENT
Start: 2024-07-22 | End: 2024-07-22 | Stop reason: HOSPADM

## 2024-07-22 RX ORDER — ACETAMINOPHEN 325 MG/1
650 TABLET ORAL EVERY 4 HOURS PRN
Status: DISCONTINUED | OUTPATIENT
Start: 2024-07-23 | End: 2024-07-23 | Stop reason: HOSPADM

## 2024-07-22 RX ORDER — SODIUM CHLORIDE 0.9 % (FLUSH) 0.9 %
10 SYRINGE (ML) INJECTION EVERY 6 HOURS PRN
Status: DISCONTINUED | OUTPATIENT
Start: 2024-07-23 | End: 2024-07-23 | Stop reason: HOSPADM

## 2024-07-22 RX ORDER — IBUPROFEN 200 MG
16 TABLET ORAL
Status: DISCONTINUED | OUTPATIENT
Start: 2024-07-23 | End: 2024-07-23 | Stop reason: HOSPADM

## 2024-07-22 RX ORDER — GLUCAGON 1 MG
1 KIT INJECTION
Status: DISCONTINUED | OUTPATIENT
Start: 2024-07-23 | End: 2024-07-23 | Stop reason: HOSPADM

## 2024-07-22 RX ORDER — POLYETHYLENE GLYCOL 3350 17 G/17G
17 POWDER, FOR SOLUTION ORAL DAILY PRN
Status: DISCONTINUED | OUTPATIENT
Start: 2024-07-23 | End: 2024-07-23 | Stop reason: HOSPADM

## 2024-07-22 RX ORDER — CEFDINIR 300 MG/1
300 CAPSULE ORAL EVERY 12 HOURS
Status: DISCONTINUED | OUTPATIENT
Start: 2024-07-23 | End: 2024-07-23 | Stop reason: HOSPADM

## 2024-07-22 RX ORDER — CEFDINIR 300 MG/1
300 CAPSULE ORAL EVERY 12 HOURS
Qty: 20 CAPSULE | Refills: 0 | Status: SHIPPED | OUTPATIENT
Start: 2024-07-22 | End: 2024-08-01

## 2024-07-22 RX ORDER — TALC
3 POWDER (GRAM) TOPICAL NIGHTLY PRN
Status: DISCONTINUED | OUTPATIENT
Start: 2024-07-23 | End: 2024-07-23 | Stop reason: HOSPADM

## 2024-07-22 RX ORDER — LANOLIN ALCOHOL/MO/W.PET/CERES
800 CREAM (GRAM) TOPICAL
Status: DISCONTINUED | OUTPATIENT
Start: 2024-07-23 | End: 2024-07-23 | Stop reason: HOSPADM

## 2024-07-22 RX ORDER — IBUPROFEN 200 MG
24 TABLET ORAL
Status: DISCONTINUED | OUTPATIENT
Start: 2024-07-23 | End: 2024-07-23 | Stop reason: HOSPADM

## 2024-07-22 RX ORDER — ONDANSETRON HYDROCHLORIDE 2 MG/ML
4 INJECTION, SOLUTION INTRAVENOUS EVERY 6 HOURS PRN
Status: DISCONTINUED | OUTPATIENT
Start: 2024-07-23 | End: 2024-07-23 | Stop reason: HOSPADM

## 2024-07-22 RX ORDER — ALUMINUM HYDROXIDE, MAGNESIUM HYDROXIDE, AND SIMETHICONE 1200; 120; 1200 MG/30ML; MG/30ML; MG/30ML
30 SUSPENSION ORAL 4 TIMES DAILY PRN
Status: DISCONTINUED | OUTPATIENT
Start: 2024-07-23 | End: 2024-07-23 | Stop reason: HOSPADM

## 2024-07-22 RX ADMIN — IOHEXOL 75 ML: 350 INJECTION, SOLUTION INTRAVENOUS at 11:07

## 2024-07-22 RX ADMIN — TRAMADOL HYDROCHLORIDE 50 MG: 50 TABLET, COATED ORAL at 09:07

## 2024-07-22 RX ADMIN — CEFDINIR 300 MG: 300 CAPSULE ORAL at 11:07

## 2024-07-22 RX ADMIN — TAMSULOSIN HYDROCHLORIDE 0.4 MG: 0.4 CAPSULE ORAL at 09:07

## 2024-07-22 RX ADMIN — APIXABAN 10 MG: 2.5 TABLET, FILM COATED ORAL at 11:07

## 2024-07-22 NOTE — PLAN OF CARE
Pt clear for DC from case management standpoint. Discharging to home         07/22/24 1325   Final Note   Assessment Type Final Discharge Note   Anticipated Discharge Disposition Home

## 2024-07-22 NOTE — NURSING
IV and tele removed. Orders reviewed. Prescriptions sent to pharmacy. Pt ride here. Pt escorted to car via w/c. Pt d/c home

## 2024-07-22 NOTE — HPI
Yovani Melvin is a 39 y.o. male who is s/p left ureteroscopy on 7/5/24.    He presented to the ER last night with right shoulder and neck pain. Has been having dull left flank pain since his procedure. He still has a stent in place.     Dx with bilateral PE.     Kidney function and WBC are normal.   Urine concerning for infection however so far no growth on culture.     CT abd/pelvis shows no hydro or fluid collections.     He denies fevers.   Has been having hematuria now that he has been started on heparin.

## 2024-07-22 NOTE — SUBJECTIVE & OBJECTIVE
Past Medical History:   Diagnosis Date    Nephrolithiasis        Past Surgical History:   Procedure Laterality Date    CYSTOURETEROSCOPY, WITH HOLMIUM LASER LITHOTRIPSY OF URETERAL CALCULUS AND STENT INSERTION Left 7/5/2024    Procedure: CYSTOURETEROSCOPY, WITH HOLMIUM LASER LITHOTRIPSY OF URETERAL CALCULUS AND STENT INSERTION;  Surgeon: Milana Encinas MD;  Location: [facility];  Service: Urology;  Laterality: Left;    URETEROSCOPIC REMOVAL OF URETERIC CALCULUS N/A 7/5/2024    Procedure: REMOVAL, CALCULUS, URETER, URETEROSCOPIC;  Surgeon: Milana Encinas MD;  Location: [facility];  Service: Urology;  Laterality: N/A;       Review of patient's allergies indicates:   Allergen Reactions    Aspirin Palpitations and Shortness Of Breath    Hydrocodone     Penicillins        Family History       Problem Relation (Age of Onset)    COPD Maternal Grandfather    Diabetes Father, Paternal Aunt, Paternal Grandfather    Emphysema Maternal Grandfather    Hypertension Mother    Migraines Mother    Sarcoidosis Paternal Aunt            Tobacco Use    Smoking status: Never    Smokeless tobacco: Never   Substance and Sexual Activity    Alcohol use: Never    Drug use: Never    Sexual activity: Not on file       Review of Systems   Constitutional:  Negative for fever.   Gastrointestinal:  Negative for abdominal pain.   Genitourinary:  Positive for flank pain and hematuria. Negative for difficulty urinating.   Neurological:  Negative for weakness.       Objective:     Temp:  [97.1 °F (36.2 °C)-100.3 °F (37.9 °C)] 97.4 °F (36.3 °C)  Pulse:  [] 79  Resp:  [16-20] 18  SpO2:  [94 %-98 %] 97 %  BP: (112-140)/(62-89) 120/81  Weight: 77.1 kg (169 lb 15.6 oz)  Body mass index is 25.84 kg/m².    Date 07/22/24 0700 - 07/23/24 0659   Shift 9762-2750 5922-0537 1699-4714 24 Hour Total   INTAKE   Shift Total(mL/kg)       OUTPUT   Urine(mL/kg/hr) 100   100   Shift Total(mL/kg) 100(1.3)   100(1.3)   Weight (kg) 77.1 77.1 77.1 77.1           Drains       Drain  Duration                  Ureteral Drain/Stent 07/05/24 1249 Left ureter 6 Fr. 16 days                     Physical Exam  Constitutional:       General: He is not in acute distress.     Appearance: Normal appearance. He is not ill-appearing.   HENT:      Head: Normocephalic and atraumatic.   Eyes:      General: No scleral icterus.  Cardiovascular:      Rate and Rhythm: Normal rate and regular rhythm.   Pulmonary:      Effort: Pulmonary effort is normal. No respiratory distress.   Skin:     Coloration: Skin is not jaundiced.   Neurological:      General: No focal deficit present.      Mental Status: He is alert and oriented to person, place, and time.   Psychiatric:         Mood and Affect: Mood normal.         Behavior: Behavior normal.         Thought Content: Thought content normal.          Significant Labs:    BMP:  Recent Labs   Lab 07/21/24  0427 07/22/24  0350    139   K 3.8 4.1    104   CO2 22* 22*   BUN 12 12   CREATININE 1.2 1.2   CALCIUM 9.8 10.2       CBC:  Recent Labs   Lab 07/21/24  0428 07/21/24  0716 07/22/24  0350   WBC 8.37 8.60 6.62   HGB 15.3 16.0 16.4   HCT 45.9 47.6 50.7    288 329       Blood Culture:   Recent Labs   Lab 07/21/24  0427   LABBLOO No Growth to date  No Growth to date     Urine Culture:   Recent Labs   Lab 07/21/24  0433   LABURIN No growth to date     Urine Studies:   Recent Labs   Lab 07/21/24  0433   COLORU [place]*   APPEARANCEUA Hazy*   PHUR 6.0   SPECGRAV 1.020   PROTEINUA 2+*   GLUCUA Negative   KETONESU Negative   BILIRUBINUA Negative   OCCULTUA 3+*   NITRITE Negative   UROBILINOGEN Negative   LEUKOCYTESUR 3+*   RBCUA >100*   WBCUA >100*   BACTERIA Many*   HYALINECASTS 0     All pertinent labs results from the past 24 hours have been reviewed.    Significant Imaging:  All pertinent imaging results/findings from the past 24 hours have been reviewed.

## 2024-07-22 NOTE — MEDICAL/APP STUDENT
[facility]  History & Physical    Patient Name: Yovani Melvin  MRN: [id number]  Patient Class: IP- Inpatient  Admission Date: 7/21/2024  Attending Physician: Galdino Hurst MD   Primary Care Provider: Mumtaz Fountain FNP-C     Patient information was obtained from patient, past medical records, and ER records.     History of Present Illness: Mr. Melvin is a 39-year-old male with a past medical history of nephrolithiasis and is status post left ureteral stent placement on 07/05/2024 per Dr. Encinas on day 2 of admission. Patient presented to ED 2 days ago for left flank and shoulder pain. Patient had elevated d-dimer and was positive for bilateral PE on CTA. He was started on heparin infusion. Pt also had a CT of abdomen/pelvis with IV contrast which showed the left ureteral stent to be stable without residual hydronephrosis and a nonobstructing right renal stone. He was also found to have a UTI and started on IV Rocephin. A culture was collected. Patient today reports felling well. States pain on flank is very mild and controlled with medicine. Reports some hematuria over night. He has not felt any fluttering on his neck. He denies N/V, abdominal pain, dysuria, fever, chills, CP, SOB, dizziness, swelling, or further complaints. He reports he was able to get up through the night a couple of times to use the restroom.       Past Medical History:   Diagnosis Date    Nephrolithiasis      Past Surgical History:   Procedure Laterality Date    CYSTOURETEROSCOPY, WITH HOLMIUM LASER LITHOTRIPSY OF URETERAL CALCULUS AND STENT INSERTION Left 7/5/2024    Procedure: CYSTOURETEROSCOPY, WITH HOLMIUM LASER LITHOTRIPSY OF URETERAL CALCULUS AND STENT INSERTION;  Surgeon: Milana Encinas MD;  Location: [facility];  Service: Urology;  Laterality: Left;    URETEROSCOPIC REMOVAL OF URETERIC CALCULUS N/A 7/5/2024    Procedure: REMOVAL, CALCULUS, URETER, URETEROSCOPIC;  Surgeon: Milana Encinas  MD ABBIE;  Location: [facility];  Service: Urology;  Laterality: N/A;     Family History   Problem Relation Name Age of Onset    Migraines Mother      Hypertension Mother      Diabetes Father      Diabetes Paternal Aunt      Sarcoidosis Paternal Aunt      Diabetes Paternal Grandfather      Emphysema Maternal Grandfather      COPD Maternal Grandfather       Social History     Tobacco Use    Smoking status: Never    Smokeless tobacco: Never   Substance Use Topics    Alcohol use: Never    Drug use: Never      Review of patient's allergies indicates:   Allergen Reactions    Aspirin Palpitations and Shortness Of Breath    Hydrocodone     Penicillins      PTA Medications   Medication Sig    ondansetron (ZOFRAN-ODT) 4 MG TbDL Take 1 tablet (4 mg total) by mouth every 8 (eight) hours as needed (nausea/vomiting).    tamsulosin (FLOMAX) 0.4 mg Cap Take 1 capsule (0.4 mg total) by mouth once daily.    traMADoL (ULTRAM) 50 mg tablet Take 1 tablet (50 mg total) by mouth every 6 (six) hours as needed for Pain.    ketorolac (TORADOL) 10 mg tablet Take 10 mg by mouth every 6 (six) hours.     Review of Systems:  Constitutional: no fever or chills  Eyes: no visual changes  Ears, nose, mouth, throat, and face: no nasal congestion or sore throat  Respiratory: no cough or shorness of breath  Cardiovascular: no chest pain or palpitations  Gastrointestinal: no nausea or vomiting, no abdominal pain or change in bowel habits  Genitourinary: no dysuria. (+) hematuria  Integument/breast: no rash or pruritis  Hematologic/lymphatic: no easy bruising or lymphadenopathy  Musculoskeletal: no arthralgias or myalgias  Neurological: no seizures or tremors. No dizziness.   Behavioral/Psych: no auditory or visual hallucinations  Endocrine: no heat or cold intolerance     OBJECTIVE:     Vital Signs (Most Recent)  Temp: 98.6 °F (37 °C) (07/22/24 0840)  Pulse: 89 (07/22/24 0840)  Resp: 16 (07/22/24 0917)  BP: 118/80 (07/22/24 0840)  SpO2: 95 % (07/22/24  0840)    Physical Exam:  General appearance: well developed, appears stated age  Head: normocephalic, atraumatic  Eyes:  conjunctivae/corneas clear. PERRL.  Nose: Nares normal. Septum midline.  Throat: lips, mucosa, and tongue normal; teeth and gums normal, no throat erythema.  Neck: supple, symmetrical, trachea midline, no JVD and thyroid not enlarged, symmetric, no tenderness/mass/nodules  Lungs:  clear to auscultation bilaterally and normal respiratory effort  Chest wall: no tenderness  Heart: regular rate and rhythm, S1, S2 normal, no murmur, click, rub or gallop  Abdomen: soft, non-tender non-distended; bowel sounds normal; no masses,  no organomegaly  Extremities: no cyanosis, clubbing or edema.   Pulses: 2+ and symmetric  Skin: Skin color, texture, turgor normal. No rashes or lesions.  Lymph nodes: Cervical, supraclavicular, and axillary nodes normal.  Neurologic: Normal strength and tone. No focal numbness or weakness. CNII-XII intact.      Laboratory:   I have reviewed all pertinent lab results within the past 24 hours.  CBC:   Recent Labs   Lab 07/22/24  0350   WBC 6.62   RBC 5.52   HGB 16.4   HCT 50.7      MCV 92   MCH 29.7   MCHC 32.3     BMP:   Recent Labs   Lab 07/22/24  0350         K 4.1      CO2 22*   BUN 12   CREATININE 1.2   CALCIUM 10.2   MG 2.3     Microbiology Results (last 7 days)       Procedure Component Value Units Date/Time    Urine culture [[id number]] Collected: 07/21/24 0433    Order Status: Completed Specimen: Urine Updated: 07/22/24 0728     Urine Culture, Routine No growth to date    Narrative:      Specimen Source->Urine    Blood culture x two cultures. Draw prior to antibiotics. [[id number]] Collected: 07/21/24 0427    Order Status: Completed Specimen: Blood from Peripheral, Hand, Right Updated: 07/21/24 1717     Blood Culture, Routine No Growth to date    Narrative:      Aerobic and anaerobic    Blood culture x two cultures. Draw prior to antibiotics.  [[id number]] Collected: 07/21/24 0427    Order Status: Completed Specimen: Blood from Peripheral, Antecubital, Left Updated: 07/21/24 1717     Blood Culture, Routine No Growth to date    Narrative:      Aerobic and anaerobic            Microbiology Results (last 7 days)       Procedure Component Value Units Date/Time    Urine culture [[id number]] Collected: 07/21/24 0433    Order Status: Completed Specimen: Urine Updated: 07/22/24 0728     Urine Culture, Routine No growth to date    Narrative:      Specimen Source->Urine    Blood culture x two cultures. Draw prior to antibiotics. [[id number]] Collected: 07/21/24 0427    Order Status: Completed Specimen: Blood from Peripheral, Hand, Right Updated: 07/21/24 1717     Blood Culture, Routine No Growth to date    Narrative:      Aerobic and anaerobic    Blood culture x two cultures. Draw prior to antibiotics. [[id number]] Collected: 07/21/24 0427    Order Status: Completed Specimen: Blood from Peripheral, Antecubital, Left Updated: 07/21/24 1717     Blood Culture, Routine No Growth to date    Narrative:      Aerobic and anaerobic            Assessment/Plan:   - Ngozi is a 39-year-old male with a past medical history of nephrolithiasis and is status post left ureteral stent placement on 07/05/2024 per Dr. Encinas on day 2 of admission.     * Bilateral pulmonary embolism  CTA (-) RV strain  Venous US BLE showed no evidence of deep venous thrombosis in either lower extremity.   Echocardiogram: pending*  Transition to Oral Apixaban     Palpitations  Currently normal sinus rhythm on telemetry.     Near syncope  Reportedly after starting BuSpar, no episodes since discontinuing the medication.  No episodes this morning.   Echocardiogram pending   Carotid ultrasound showed no evidence of a hemodynamically significant carotid bifurcation stenosis.     Acute cystitis with hematuria  Urine culture pending  Transition to oral Cefdinir  Await final culture        Ureteral stone  S/P  L-ureteral stent 7/5/24 per Dr. Encinas  Per Dr. Encinas recommendations will continue IV abx until culture is finalized, likely will continue abx through stent removal  Repeat CT today with drainage films to make sure there is no extravasation from the left kidney   If CT is negative then will plan to pull stent in clinic on Wednesday, 7/24  Encouraged fluid intake   Hematuria expected with stent in place and anticoagulation   Pain management: Resume home Tramadol.     VTE Risk Mitigation (From admission, onward)           Ordered     enoxaparin injection 80 mg  Every 12 hours         07/21/24 0947     IP VTE HIGH RISK PATIENT  Once         07/21/24 0756     Place sequential compression device  Until discontinued         07/21/24 0756                  NAA Steel  Department of Hospital Medicine   [place]/Surg

## 2024-07-22 NOTE — PLAN OF CARE
Problem: Adult Inpatient Plan of Care  Goal: Plan of Care Review  Outcome: Progressing  Goal: Patient-Specific Goal (Individualized)  Outcome: Progressing  Goal: Absence of Hospital-Acquired Illness or Injury  Outcome: Progressing  Goal: Optimal Comfort and Wellbeing  Outcome: Progressing     Problem: Fall Injury Risk  Goal: Absence of Fall and Fall-Related Injury  Outcome: Progressing

## 2024-07-22 NOTE — ASSESSMENT & PLAN NOTE
- Continue IV abx until culture is finalized, likely will continue abx through stent removal  - Repeat CT today with drainage films to make sure there is no extravasation from the left kidney   - If CT is negative then will plan to pull stent in clinic on Wednesday, 7/24  - Encouraged fluid intake   - Hematuria expected with stent in place and anticoagulation

## 2024-07-22 NOTE — PLAN OF CARE
PCP appointment scheduled and added to AVS     Did not schedule with DC clinic per smart scheduling suggestion for PCP preference.        07/22/24 1203   Post-Acute Status   Hospital Resources/Appts/Education Provided Appointments scheduled and added to AVS

## 2024-07-22 NOTE — CONSULTS
[facility]/Surg  Urology  Consult Note    Patient Name: Yovani Melvin  MRN: [id number]  Admission Date: 7/21/2024  Hospital Length of Stay: 1   Code Status: Full Code   Attending Provider: Galdino Hurst MD   Consulting Provider: Milana Encinas MD  Primary Care Physician: Mumtaz Fountain FNP-C  Principal Problem:Bilateral pulmonary embolism    Inpatient consult to Urology  Consult performed by: Milana Encinas MD  Consult ordered by: Diana German NP          Subjective:     HPI:  Yovani Melvin is a 39 y.o. male who is s/p left ureteroscopy on 7/5/24.    He presented to the ER last night with right shoulder and neck pain. Has been having dull left flank pain since his procedure. He still has a stent in place.     Dx with bilateral PE.     Kidney function and WBC are normal.   Urine concerning for infection however so far no growth on culture.     CT abd/pelvis shows no hydro or fluid collections.     He denies fevers.   Has been having hematuria now that he has been started on heparin.     Past Medical History:   Diagnosis Date    Nephrolithiasis        Past Surgical History:   Procedure Laterality Date    CYSTOURETEROSCOPY, WITH HOLMIUM LASER LITHOTRIPSY OF URETERAL CALCULUS AND STENT INSERTION Left 7/5/2024    Procedure: CYSTOURETEROSCOPY, WITH HOLMIUM LASER LITHOTRIPSY OF URETERAL CALCULUS AND STENT INSERTION;  Surgeon: Milana Encinas MD;  Location: [facility];  Service: Urology;  Laterality: Left;    URETEROSCOPIC REMOVAL OF URETERIC CALCULUS N/A 7/5/2024    Procedure: REMOVAL, CALCULUS, URETER, URETEROSCOPIC;  Surgeon: Milana Encinas MD;  Location: [facility];  Service: Urology;  Laterality: N/A;       Review of patient's allergies indicates:   Allergen Reactions    Aspirin Palpitations and Shortness Of Breath    Hydrocodone     Penicillins        Family History       Problem Relation (Age of Onset)    COPD Maternal Grandfather    Diabetes Father, Paternal Aunt,  Paternal Grandfather    Emphysema Maternal Grandfather    Hypertension Mother    Migraines Mother    Sarcoidosis Paternal Aunt            Tobacco Use    Smoking status: Never    Smokeless tobacco: Never   Substance and Sexual Activity    Alcohol use: Never    Drug use: Never    Sexual activity: Not on file       Review of Systems   Constitutional:  Negative for fever.   Gastrointestinal:  Negative for abdominal pain.   Genitourinary:  Positive for flank pain and hematuria. Negative for difficulty urinating.   Neurological:  Negative for weakness.       Objective:     Temp:  [97.1 °F (36.2 °C)-100.3 °F (37.9 °C)] 97.4 °F (36.3 °C)  Pulse:  [] 79  Resp:  [16-20] 18  SpO2:  [94 %-98 %] 97 %  BP: (112-140)/(62-89) 120/81  Weight: 77.1 kg (169 lb 15.6 oz)  Body mass index is 25.84 kg/m².    Date 07/22/24 0700 - 07/23/24 0659   Shift 3696-1108 1582-8761 0120-4566 24 Hour Total   INTAKE   Shift Total(mL/kg)       OUTPUT   Urine(mL/kg/hr) 100   100   Shift Total(mL/kg) 100(1.3)   100(1.3)   Weight (kg) 77.1 77.1 77.1 77.1          Drains       Drain  Duration                  Ureteral Drain/Stent 07/05/24 1249 Left ureter 6 Fr. 16 days                     Physical Exam  Constitutional:       General: He is not in acute distress.     Appearance: Normal appearance. He is not ill-appearing.   HENT:      Head: Normocephalic and atraumatic.   Eyes:      General: No scleral icterus.  Cardiovascular:      Rate and Rhythm: Normal rate and regular rhythm.   Pulmonary:      Effort: Pulmonary effort is normal. No respiratory distress.   Skin:     Coloration: Skin is not jaundiced.   Neurological:      General: No focal deficit present.      Mental Status: He is alert and oriented to person, place, and time.   Psychiatric:         Mood and Affect: Mood normal.         Behavior: Behavior normal.         Thought Content: Thought content normal.          Significant Labs:    BMP:  Recent Labs   Lab 07/21/24  0427 07/22/24  0350   NA  140 139   K 3.8 4.1    104   CO2 22* 22*   BUN 12 12   CREATININE 1.2 1.2   CALCIUM 9.8 10.2       CBC:  Recent Labs   Lab 07/21/24  0428 07/21/24  0716 07/22/24  0350   WBC 8.37 8.60 6.62   HGB 15.3 16.0 16.4   HCT 45.9 47.6 50.7    288 329       Blood Culture:   Recent Labs   Lab 07/21/24  0427   LABBLOO No Growth to date  No Growth to date     Urine Culture:   Recent Labs   Lab 07/21/24  0433   LABURIN No growth to date     Urine Studies:   Recent Labs   Lab 07/21/24  0433   COLORU [place]*   APPEARANCEUA Hazy*   PHUR 6.0   SPECGRAV 1.020   PROTEINUA 2+*   GLUCUA Negative   KETONESU Negative   BILIRUBINUA Negative   OCCULTUA 3+*   NITRITE Negative   UROBILINOGEN Negative   LEUKOCYTESUR 3+*   RBCUA >100*   WBCUA >100*   BACTERIA Many*   HYALINECASTS 0     All pertinent labs results from the past 24 hours have been reviewed.    Significant Imaging:  All pertinent imaging results/findings from the past 24 hours have been reviewed.    Assessment and Plan:     Ureteral stone  - Continue IV abx until culture is finalized, likely will continue abx through stent removal  - Repeat CT today with drainage films to make sure there is no extravasation from the left kidney   - If CT is negative then will plan to pull stent in clinic on Wednesday, 7/24  - Encouraged fluid intake   - Hematuria expected with stent in place and anticoagulation         VTE Risk Mitigation (From admission, onward)           Ordered     enoxaparin injection 80 mg  Every 12 hours         07/21/24 0947     IP VTE HIGH RISK PATIENT  Once         07/21/24 0756     Place sequential compression device  Until discontinued         07/21/24 0756                    Thank you for your consult. I will follow-up with patient. Please contact us if you have any additional questions.    Milana Encinas MD  Urology  [facility]/Surg

## 2024-07-22 NOTE — PLAN OF CARE
[facility] - [place], [place] - [address] [address]  [address] [address] [address]  Phone: [phone] Fax: [phone]        Eliquis covered and in stock. $0 copay today with coupon.     CM provided pt with $10copay card for future refills

## 2024-07-22 NOTE — DISCHARGE SUMMARY
[facility] Medicine  Discharge Summary      Patient Name: Yovani Melvin  MRN: [id number]  [facility]: [id number]  Patient Class: IP- Inpatient  Admission Date: 7/21/2024  Hospital Length of Stay: 1 days  Discharge Date and Time:  07/22/2024 12:46 PM  Attending Physician: Galdino Hurst MD   Discharging Provider: Diana German NP  Primary Care Provider: Mumtaz Fountain FNP-C    Primary Care Team: Networked reference to record PCT     HPI:   Mr. Melvin is a 39-year-old male with a past medical history of nephrolithiasis and is status post left ureteral stent placement on 07/05/2024 per Dr. Encinas.  He presented to the ED last night for complaints of left flank pain.  While in the ED, he was found to be tachycardic in his D-dimer came back elevated.  He had a chest CTA which was positive bilateral PE.  He was started on a heparin infusion.  He also had a CT of abdomen/pelvis with IV contrast which showed the left ureteral stent to be stable without residual hydronephrosis and a nonobstructing right renal stone.  He was also found to have a UTI and started on IV Rocephin.  A culture was collected. He reports having a recent episode at home where he felt like he was going to pass out.  He also gets a fluttering on the left side of his neck intermittently.  This started after he was prescribed BuSpar, which she has since stopped taking, however, this fluttering persist.  He also feels like he has trouble swallowing.  His spouse is at bedside endorses talking with Dr. Encinas today about getting the ureteral stent removed while he is here.  It was scheduled to come out on 07/30.  He reports the pain to be significant and has not taken anything other than Tylenol.  He has not tried the tramadol.  He was also been having urinary burning in his urine has been dark. He currently denies dizziness, shortness of breath, chest pain, nausea/vomiting. He is being admitted for further treatment  and management of his condition.    * No surgery found *      Hospital Course:   Mr. Melvin was admitted for bilateral PE and UTI.  While here, his labs and vital signs were monitored and he was maintained on telemetry. He was initially placed on a heparin infusion, then transitioned to full-dose Lovenox injections.  A venous ultrasound of BLE was done and was negative for DVT.  His echocardiogram was unremarkable.  He reports having an episode of pre syncope, which he thinks was due to a medication, and a carotid ultrasound was done and was negative for stenosis.  He had no neuro deficits or loss of consciousness and so head imaging was not obtained. A urine culture was collected and he was placed on empiric IV Rocephin. He had a recent left ureteral stent which was placed and causing him significant pain, for which he was treated with IV Toradol PRN. His urologist, Dr. Encinas, was consulted and evaluated him.  He had a CT of the abdomen/pelvis w& w/o contrast and was negative for contrast extravasation. He has not had any presyncope symptoms or sensation of palpitations since being here.  He reports having no overnight events and has been ambulating in the room without issues.  His vital signs remained stable and tachycardia improved.  He did urinate a blood clot and have some hematuria during the night, which is expected due to the ureteral stent, and has had no other issues with urinating.  He has been transitioned to Eliquis today and will continue a course of cefdinir for his UTI.  His urine culture was negative so far.  He is being discharged to home today in stable condition. He will follow up with Urology on 07/25 for ureteral stent removal.  He will be referred to Hematology, however, he states that he will be moving to [place] soon and we will most likely wait and follow up with a hematologist there.  He did not have any adverse events while here.     Goals of Care Treatment Preferences:  Code Status:  Full Code      Consults:   Consults (From admission, onward)          Status Ordering Provider     Inpatient consult to Urology  Once        Provider:  Milana Encinas MD    Completed WILLY PRESCOTT            No new Assessment & Plan notes have been filed under this hospital service since the last note was generated.  Service: Hospital Medicine    Final Active Diagnoses:    Diagnosis Date Noted POA    PRINCIPAL PROBLEM:  Bilateral pulmonary embolism [I26.99] 07/21/2024 Yes    Acute cystitis with hematuria [N30.01] 07/21/2024 Yes    Ureteral stone [N20.1] 07/05/2024 Yes      Problems Resolved During this Admission:    Diagnosis Date Noted Date Resolved POA    Near syncope [R55] 07/21/2024 07/22/2024 Yes    Palpitations [R00.2] 07/21/2024 07/22/2024 Yes       Discharged Condition: stable    Disposition: Home or Self Care    Follow Up:   Follow-up Information       Milana Encinas MD. Go on 7/31/2024.    Specialty: Urology  Why: Office to call with instructions  Contact information:  [address] [place] [address] [address]IVE  [address]  [facility] [address]  [phone]               Mumtaz Fountain FNP-C. Go on 7/30/2024.    Specialty: Family Medicine  Why: AT 1:00 PM for hospital follow up  Contact information:  [facility]  [address]  [place] [address]  [phone]                           Patient Instructions:      Ambulatory referral/consult to Hematology / Oncology   Standing Status: Future   Referral Priority: Routine Referral Type: Consultation   Referral Reason: Specialty Services Required   Requested Specialty: Hematology and Oncology   Number of Visits Requested: 1     Notify your health care provider if you experience any of the following:  temperature >100.4     Notify your health care provider if you experience any of the following:  severe uncontrolled pain     Notify your health care provider if you experience any of the following:  persistent nausea and vomiting or diarrhea     Notify your  health care provider if you experience any of the following:  difficulty breathing or increased cough     Notify your health care provider if you experience any of the following:  persistent dizziness, light-headedness, or visual disturbances     Activity as tolerated       Significant Diagnostic Studies: Labs: CMP   Recent Labs   Lab 07/21/24 0427 07/22/24  0350    139   K 3.8 4.1    104   CO2 22* 22*   * 103   BUN 12 12   CREATININE 1.2 1.2   CALCIUM 9.8 10.2   PROT 7.3  --    ALBUMIN 4.1  --    BILITOT 0.4  --    ALKPHOS 67  --    AST 12  --    ALT 19  --    ANIONGAP 13 13   , CBC   Recent Labs   Lab 07/21/24  0428 07/21/24  0716 07/22/24  0350   WBC 8.37 8.60 6.62   HGB 15.3 16.0 16.4   HCT 45.9 47.6 50.7    288 329   , and All labs within the past 24 hours have been reviewed  Microbiology:   Microbiology Results (last 7 days)       Procedure Component Value Units Date/Time    Blood culture x two cultures. Draw prior to antibiotics. [[id number]] Collected: 07/21/24 0427    Order Status: Completed Specimen: Blood from Peripheral, Hand, Right Updated: 07/22/24 1032     Blood Culture, Routine No Growth to date      No Growth to date    Narrative:      Aerobic and anaerobic    Blood culture x two cultures. Draw prior to antibiotics. [[id number]] Collected: 07/21/24 0427    Order Status: Completed Specimen: Blood from Peripheral, Antecubital, Left Updated: 07/22/24 1032     Blood Culture, Routine No Growth to date      No Growth to date    Narrative:      Aerobic and anaerobic    Urine culture [[id number]] Collected: 07/21/24 0433    Order Status: Completed Specimen: Urine Updated: 07/22/24 0728     Urine Culture, Routine No growth to date    Narrative:      Specimen Source->Urine           Radiology: CT Abdomen Pelvis With IV Contrast NO Oral Contrast  Narrative: EXAMINATION:  CT ABDOMEN PELVIS WITH IV CONTRAST    CLINICAL HISTORY:  eval drainage of left kidney for  extrav;    TECHNIQUE:  Low dose axial images, sagittal and coronal reformations were obtained from the lung bases to the pubic symphysis following the IV administration of 75 mL of Omnipaque 350 .  Oral contrast was not given.    Note that imaging was attained 3 minutes after contrast injection per request of the ordering physician.    COMPARISON:  07/21/2024    FINDINGS:  There is atelectasis in both lung bases and trace right pleural fluid.  Normal size heart.  There is some hyperdense material in the gallbladder lumen presumably vicarious excretion of contrast or less likely sludge.    There is a left double-J ureteral stent with left hydroureteronephrosis.  There is some early excretion of contrast in the renal collecting systems and also contrast proceeding down the right ureter and partially filling the bladder.  Minimal if any contrast is present in the left ureter.  No extraluminal extravasation of contrast.  Spleen is 14 cm in length.  Remaining solid abdominal organs are unremarkable.    Insert opacified no dilated bowel loops.  Normal appendix.    Normal size prostate.  No urinary bladder wall thickening.  A no focal abdominal fluid collection.  Small fat containing umbilical defect.    There is a left unilateral L5 pars defect.  Impression: 1. Appropriately positioned left ureteral stent with mild residual left hydronephrosis.  No evidence for contrast extravasation.  2. Mild splenomegaly.    Electronically signed by: Michael Mckeon  Date:    07/22/2024  Time:    14:59  Echo    Left Ventricle: The left ventricle is normal in size. Normal wall   thickness. There is normal systolic function with a visually estimated   ejection fraction of 55 - 60%. There is normal diastolic function.    Right Ventricle: Normal right ventricular cavity size. Wall thickness   is normal. Systolic function is normal.    Tricuspid Valve: There is mild regurgitation. The estimated PA systolic   pressure is at least 23 mmHg.     IVC/SVC: Normal venous pressure at 3 mmHg.          Medications:  Reconciled Home Medications:      Medication List        START taking these medications      apixaban 5 mg Tab  Commonly known as: ELIQUIS  Take 2 tablets (10 mg total) by mouth 2 (two) times daily for 7 days, THEN 1 tablet (5 mg total) 2 (two) times daily. Take 10mg bid x7 days, then 5mg bid.  Start taking on: July 22, 2024     cefdinir 300 MG capsule  Commonly known as: OMNICEF  Take 1 capsule (300 mg total) by mouth every 12 (twelve) hours. for 10 days            CONTINUE taking these medications      ketorolac 10 mg tablet  Commonly known as: TORADOL  Take 10 mg by mouth every 6 (six) hours.     ondansetron 4 MG Tbdl  Commonly known as: ZOFRAN-ODT  Take 1 tablet (4 mg total) by mouth every 8 (eight) hours as needed (nausea/vomiting).     tamsulosin 0.4 mg Cap  Commonly known as: FLOMAX  Take 1 capsule (0.4 mg total) by mouth once daily.     traMADoL 50 mg tablet  Commonly known as: ULTRAM  Take 1 tablet (50 mg total) by mouth every 6 (six) hours as needed for Pain.              Indwelling Lines/Drains at time of discharge:   Lines/Drains/Airways       Drain  Duration                  Ureteral Drain/Stent 07/05/24 1249 Left ureter 6 Fr. 16 days                    Time spent on the discharge of patient: 34 minutes         Diana German NP  Department of Hospital Medicine  [facility]/Surg

## 2024-07-23 ENCOUNTER — PATIENT OUTREACH (OUTPATIENT)
Dept: ADMINISTRATIVE | Facility: CLINIC | Age: 39
End: 2024-07-23
Payer: COMMERCIAL

## 2024-07-23 ENCOUNTER — TELEPHONE (OUTPATIENT)
Facility: CLINIC | Age: 39
End: 2024-07-23
Payer: COMMERCIAL

## 2024-07-23 VITALS
RESPIRATION RATE: 17 BRPM | WEIGHT: 163.69 LBS | HEIGHT: 68 IN | HEART RATE: 85 BPM | SYSTOLIC BLOOD PRESSURE: 124 MMHG | BODY MASS INDEX: 24.81 KG/M2 | TEMPERATURE: 99 F | DIASTOLIC BLOOD PRESSURE: 84 MMHG | OXYGEN SATURATION: 95 %

## 2024-07-23 LAB
ALBUMIN SERPL BCP-MCNC: 4.2 G/DL (ref 3.5–5.2)
ALP SERPL-CCNC: 53 U/L (ref 55–135)
ALT SERPL W/O P-5'-P-CCNC: 15 U/L (ref 10–44)
ANION GAP SERPL CALC-SCNC: 13 MMOL/L (ref 8–16)
AST SERPL-CCNC: 15 U/L (ref 10–40)
BACTERIA UR CULT: NO GROWTH
BILIRUB SERPL-MCNC: 0.5 MG/DL (ref 0.1–1)
BUN SERPL-MCNC: 14 MG/DL (ref 6–20)
CALCIUM SERPL-MCNC: 8.8 MG/DL (ref 8.7–10.5)
CHLORIDE SERPL-SCNC: 105 MMOL/L (ref 95–110)
CO2 SERPL-SCNC: 19 MMOL/L (ref 23–29)
CREAT SERPL-MCNC: 1 MG/DL (ref 0.5–1.4)
EST. GFR  (NO RACE VARIABLE): >60 ML/MIN/1.73 M^2
GLUCOSE SERPL-MCNC: 108 MG/DL (ref 70–110)
OHS QRS DURATION: 82 MS
OHS QRS DURATION: 86 MS
OHS QTC CALCULATION: 414 MS
OHS QTC CALCULATION: 420 MS
POTASSIUM SERPL-SCNC: 4 MMOL/L (ref 3.5–5.1)
PROT SERPL-MCNC: 6.9 G/DL (ref 6–8.4)
SODIUM SERPL-SCNC: 137 MMOL/L (ref 136–145)
TROPONIN I SERPL HS-MCNC: <2.3 PG/ML (ref 0–14.9)
TROPONIN I SERPL HS-MCNC: <2.3 PG/ML (ref 0–14.9)

## 2024-07-23 PROCEDURE — 36415 COLL VENOUS BLD VENIPUNCTURE: CPT | Performed by: HOSPITALIST

## 2024-07-23 PROCEDURE — 25000003 PHARM REV CODE 250: Performed by: STUDENT IN AN ORGANIZED HEALTH CARE EDUCATION/TRAINING PROGRAM

## 2024-07-23 PROCEDURE — 80053 COMPREHEN METABOLIC PANEL: CPT | Performed by: HOSPITALIST

## 2024-07-23 PROCEDURE — G0378 HOSPITAL OBSERVATION PER HR: HCPCS

## 2024-07-23 PROCEDURE — 25500020 PHARM REV CODE 255: Performed by: HOSPITALIST

## 2024-07-23 PROCEDURE — 96360 HYDRATION IV INFUSION INIT: CPT

## 2024-07-23 PROCEDURE — 36415 COLL VENOUS BLD VENIPUNCTURE: CPT | Performed by: STUDENT IN AN ORGANIZED HEALTH CARE EDUCATION/TRAINING PROGRAM

## 2024-07-23 PROCEDURE — 84484 ASSAY OF TROPONIN QUANT: CPT | Performed by: STUDENT IN AN ORGANIZED HEALTH CARE EDUCATION/TRAINING PROGRAM

## 2024-07-23 PROCEDURE — 94761 N-INVAS EAR/PLS OXIMETRY MLT: CPT

## 2024-07-23 PROCEDURE — 99900035 HC TECH TIME PER 15 MIN (STAT)

## 2024-07-23 PROCEDURE — 84484 ASSAY OF TROPONIN QUANT: CPT | Mod: 91 | Performed by: STUDENT IN AN ORGANIZED HEALTH CARE EDUCATION/TRAINING PROGRAM

## 2024-07-23 PROCEDURE — 99900031 HC PATIENT EDUCATION (STAT)

## 2024-07-23 RX ORDER — TRAMADOL HYDROCHLORIDE 50 MG/1
50 TABLET ORAL EVERY 6 HOURS PRN
Status: DISCONTINUED | OUTPATIENT
Start: 2024-07-23 | End: 2024-07-23 | Stop reason: HOSPADM

## 2024-07-23 RX ADMIN — CEFDINIR 300 MG: 300 CAPSULE ORAL at 08:07

## 2024-07-23 RX ADMIN — APIXABAN 10 MG: 5 TABLET, FILM COATED ORAL at 08:07

## 2024-07-23 RX ADMIN — IOHEXOL 100 ML: 350 INJECTION, SOLUTION INTRAVENOUS at 02:07

## 2024-07-23 RX ADMIN — TRAMADOL HYDROCHLORIDE 50 MG: 50 TABLET, COATED ORAL at 03:07

## 2024-07-23 RX ADMIN — TAMSULOSIN HYDROCHLORIDE 0.4 MG: 0.4 CAPSULE ORAL at 08:07

## 2024-07-23 RX ADMIN — SODIUM CHLORIDE 1000 ML: 0.9 INJECTION, SOLUTION INTRAVENOUS at 12:07

## 2024-07-23 NOTE — PHARMACY MED REC
"Admission Medication History     The home medication history was taken by Trudi Garnett.    You may go to "Admission" then "Reconcile Home Medications" tabs to review and/or act upon these items.     The home medication list has been updated by the Pharmacy department.   Please read ALL comments highlighted in yellow.   Please address this information as you see fit.    Feel free to contact us if you have any questions or require assistance.        Medications listed below were obtained from: Patient/family and Analytic software- [employer]  Current Facility-Administered Medications on File Prior to Encounter   Medication Dose Route Frequency Provider Last Rate Last Admin    [COMPLETED] iohexoL (OMNIPAQUE 350) 350 mg iodine/mL injection        75 mL at 07/22/24 1130    [DISCONTINUED] acetaminophen tablet 650 mg  650 mg Oral Q4H PRN Diana German NP   650 mg at 07/21/24 0906    [DISCONTINUED] albuterol-ipratropium 2.5 mg-0.5 mg/3 mL nebulizer solution 3 mL  3 mL Nebulization Q6H PRN Diana German, ASTRID        [DISCONTINUED] aluminum-magnesium hydroxide-simethicone 200-200-20 mg/5 mL suspension 30 mL  30 mL Oral Q6H PRN Diana German, NP        [DISCONTINUED] apixaban tablet 10 mg  10 mg Oral BID Diana German, NP   10 mg at 07/22/24 1123    [DISCONTINUED] cefdinir capsule 300 mg  300 mg Oral Q12H Diana German, NP   300 mg at 07/22/24 1122    [DISCONTINUED] ketorolac injection 15 mg  15 mg Intravenous Q6H PRN Diana German, NP   15 mg at 07/21/24 1749    [DISCONTINUED] magnesium oxide tablet 800 mg  800 mg Oral PRN Diana German, NP        [DISCONTINUED] magnesium oxide tablet 800 mg  800 mg Oral PRN Diana German, NP        [DISCONTINUED] melatonin tablet 6 mg  6 mg Oral Nightly PRN Diana German, NP        [DISCONTINUED] naloxone 0.4 mg/mL injection 0.02 mg  0.02 mg Intravenous PRN Diana German, NP        [DISCONTINUED] ondansetron injection 4 mg  4 mg Intravenous Q6H PRN Diana German" MERRICK, NP        [DISCONTINUED] phenazopyridine tablet 200 mg  200 mg Oral TID PRN Diana German, NP        [DISCONTINUED] potassium bicarbonate disintegrating tablet 35 mEq  35 mEq Oral PRN Diana German, NP        [DISCONTINUED] potassium bicarbonate disintegrating tablet 50 mEq  50 mEq Oral PRN Diana German, NP        [DISCONTINUED] potassium bicarbonate disintegrating tablet 60 mEq  60 mEq Oral PRN Diana German, NP        [DISCONTINUED] potassium, sodium phosphates 280-160-250 mg packet 2 packet  2 packet Oral PRN Diana German, NP        [DISCONTINUED] potassium, sodium phosphates 280-160-250 mg packet 2 packet  2 packet Oral PRN Diana German, NP        [DISCONTINUED] potassium, sodium phosphates 280-160-250 mg packet 2 packet  2 packet Oral PRN Diana German, NP        [DISCONTINUED] senna-docusate 8.6-50 mg per tablet 1 tablet  1 tablet Oral BID PRN Diana German, NP        [DISCONTINUED] sodium chloride 0.9% flush 10 mL  10 mL Intravenous Q12H PRN Diana German, NP        [DISCONTINUED] tamsulosin 24 hr capsule 0.4 mg  0.4 mg Oral Daily Diana German NP   0.4 mg at 07/22/24 0913    [DISCONTINUED] traMADoL tablet 50 mg  50 mg Oral Q6H PRN Diana German, NP   50 mg at 07/22/24 0917     Current Outpatient Medications on File Prior to Encounter   Medication Sig Dispense Refill    apixaban (ELIQUIS) 5 mg Tab Take 2 tablets (10 mg total) by mouth 2 (two) times daily for 7 days, THEN 1 tablet (5 mg total) 2 (two) times daily. Take 10mg bid x7 days, then 5mg bid. 30 tablet 2    ketorolac (TORADOL) 10 mg tablet Take 10 mg by mouth every 6 (six) hours.      ondansetron (ZOFRAN-ODT) 4 MG TbDL Take 1 tablet (4 mg total) by mouth every 8 (eight) hours as needed (nausea/vomiting). 15 tablet 0    tamsulosin (FLOMAX) 0.4 mg Cap Take 1 capsule (0.4 mg total) by mouth once daily. 30 capsule 0    traMADoL (ULTRAM) 50 mg tablet Take 1 tablet (50 mg total) by mouth every 6 (six) hours as needed  for Pain. 20 tablet 0    cefdinir (OMNICEF) 300 MG capsule Take 1 capsule (300 mg total) by mouth every 12 (twelve) hours. for 10 days (Patient not taking: Reported on 7/23/2024) 20 capsule 0       Potential issues to be addressed PRIOR TO DISCHARGE  Patient reported not taking the following medications: (Cefdinir). These medications remain on the home medication list. Please address accordingly.     Trudi Garnett  EXT [id number]                  .

## 2024-07-23 NOTE — PLAN OF CARE
Problem: Adult Inpatient Plan of Care  Goal: Plan of Care Review  Outcome: Progressing  Goal: Patient-Specific Goal (Individualized)  Outcome: Progressing  Goal: Absence of Hospital-Acquired Illness or Injury  Outcome: Progressing  Goal: Optimal Comfort and Wellbeing  Outcome: Progressing  Goal: Readiness for Transition of Care  Outcome: Progressing     Problem: Infection  Goal: Absence of Infection Signs and Symptoms  Outcome: Progressing     Problem: Fall Injury Risk  Goal: Absence of Fall and Fall-Related Injury  Outcome: Progressing     Problem: Pain Acute  Goal: Optimal Pain Control and Function  Outcome: Progressing

## 2024-07-23 NOTE — HPI
The patient is a 39-year-old male with past medical history of nephrolithiasis s/p left ureteral stent who presented to the ED for the evaluation of chest pain.    The patient reports that he was discharged around 3:00 p.m. today from [facility].  He reports that he was diagnosed of bilateral PE yesterday and was discharged on Eliquis.  He reports that he was given Lovenox shots twice during the hospital stay.  The patient reports that after going, he had right-sided chest pain which shifted to the center.  He also reports that he felt like he was going to pass out.  He denies any shortness of breath, nausea, vomiting.  He denies any calf pain. He denies any family history of blood clots.

## 2024-07-23 NOTE — NURSING
Oncology Navigation   Intake  Cancer Type: Benign hem  Type of Referral: External  Date of Referral: 07/22/24  Initial Nurse Navigator Contact: 07/23/24  Referral to Initial Contact Timeline (days): 1  Appointment Date: 07/30/24     Treatment                              Acuity      Follow Up  No follow-ups on file.

## 2024-07-23 NOTE — FIRST PROVIDER EVALUATION
" Emergency Department TeleTriage Encounter Note      CHIEF COMPLAINT    Chief Complaint   Patient presents with    Chest Pain     Pt wife said pt having "terrible chest pain was blacking out and having poor capillary refill"       VITAL SIGNS   Initial Vitals [07/22/24 1927]   BP Pulse Resp Temp SpO2   116/89 103 18 97.8 °F (36.6 °C) (!) 94 %      MAP       --            ALLERGIES    Review of patient's allergies indicates:   Allergen Reactions    Aspirin Palpitations and Shortness Of Breath    Hydrocodone     Penicillins        PROVIDER TRIAGE NOTE  39-year-old male with a history of a left sided UVJ stone currently has a left ureteral stent in place, with some mild hydronephrosis, presents to the ER today after being discharged from the hospital just at 3:00 p.m. today from [facility] due to worsening chest pain and shortness of breath and feeling fatigued and lightheaded his being discharged from the hospital.  He states he was admitted to the hospital after starting to have shortness of breath and chest pain was found to have a pulmonary embolism.  He is started on heparin drip which was shortly just continued and he was transitioned to Eliquis.  He took his 1st dose of Eliquis this morning and due to take his 1st prescription dose of Eliquis that was picked up at the pharmacy this afternoon tonight.  States his chest pain has only worsened.  He is here for further evaluation.  States his shortness of breath warmth worsens with exertion.  Patient had a CTA completed yesterday of his chest and a CT abdomen and pelvis completed in the past several days of his abdomen to evaluate his ureteral stone stent.      AAOx3, respirations even and non- labored, stable vital O2 sat 94% on RA, orders placed for O2 per NC is it drops below 94%, and HR 03, normal coloration of skin, sitting upright in triage chair, appears in no acute distress.      Will differ additional imagining to provider taking patient in ER. "         ORDERS  Labs Reviewed - No data to display    ED Orders (720h ago, onward)      Start Ordered     Status Ordering Provider    07/22/24 1921 07/22/24 1920  EKG 12-lead  Once         Ordered POWER, MARIEL A              Virtual Visit Note: The provider triage portion of this emergency department evaluation and documentation was performed via VOZ, a HIPAA-compliant telemedicine application, in concert with a tele-presenter in the room. A face to face patient evaluation with one of my colleagues will occur once the patient is placed in an emergency department room.      DISCLAIMER: This note was prepared with [employer] voice recognition transcription software. Garbled syntax, mangled pronouns, and other bizarre constructions may be attributed to that software system.

## 2024-07-23 NOTE — PLAN OF CARE
Patient to transport home via private vehicle with spouse.     Discharge orders and chart reviewed with no further post-acute discharge needs identified at this time.  At this time, patient is cleared for discharge from Case Management standpoint.        07/23/24 1542   Final Note   Assessment Type Final Discharge Note   Anticipated Discharge Disposition Home   Hospital Resources/Appts/Education Provided Appointments scheduled and added to AVS   Post-Acute Status   Coverage BCBS   Discharge Delays None known at this time

## 2024-07-23 NOTE — CARE UPDATE
07/23/24 1109   Home Oxygen Qualification   $ Home O2 Qualification Tech time 15 minutes   Room Air SpO2 At Rest 96 %   Room Air SpO2 During Ambulation 91 %   SpO2 Post Ambulation 95 %   Post Ambulation Heart Rate 110 bpm   Home O2 Eval Comments Patients sat 96% on room air during rest. Patients sat 91-95% during ambulation on room air. Patient does not qualify for home O2.

## 2024-07-23 NOTE — H&P
"[facility] - Emergency Dept  Hospital Medicine  History & Physical    Patient Name: Yovani Melvin  MRN: [id number]  Patient Class: OP- Observation  Admission Date: 7/22/2024  Attending Physician: Raghavendra George MD   Primary Care Provider: Mumtaz Fountain FNP-C         Patient information was obtained from patient and ER records.     Subjective:     Principal Problem:Bilateral pulmonary embolism    Chief Complaint:   Chief Complaint   Patient presents with    Chest Pain     Pt wife said pt having "terrible chest pain was blacking out and having poor capillary refill"        HPI:   The patient is a 39-year-old male with past medical history of nephrolithiasis s/p left ureteral stent who presented to the ED for the evaluation of chest pain.    The patient reports that he was discharged around 3:00 p.m. today from [facility].  He reports that he was diagnosed of bilateral PE yesterday and was discharged on Eliquis.  He reports that he was given Lovenox shots twice during the hospital stay.  The patient reports that after going, he had right-sided chest pain which shifted to the center.  He also reports that he felt like he was going to pass out.  He denies any shortness of breath, nausea, vomiting.  He denies any calf pain. He denies any family history of blood clots.    Past Medical History:   Diagnosis Date    Nephrolithiasis        Past Surgical History:   Procedure Laterality Date    CYSTOURETEROSCOPY, WITH HOLMIUM LASER LITHOTRIPSY OF URETERAL CALCULUS AND STENT INSERTION Left 7/5/2024    Procedure: CYSTOURETEROSCOPY, WITH HOLMIUM LASER LITHOTRIPSY OF URETERAL CALCULUS AND STENT INSERTION;  Surgeon: Milana Encinas MD;  Location: [facility] OR;  Service: Urology;  Laterality: Left;    URETEROSCOPIC REMOVAL OF URETERIC CALCULUS N/A 7/5/2024    Procedure: REMOVAL, CALCULUS, URETER, URETEROSCOPIC;  Surgeon: Milana Encinas MD;  Location: [facility] OR;  Service: Urology;  Laterality: " N/A;       Review of patient's allergies indicates:   Allergen Reactions    Aspirin Palpitations and Shortness Of Breath    Hydrocodone     Penicillins        Current Facility-Administered Medications on File Prior to Encounter   Medication    [COMPLETED] iohexoL (OMNIPAQUE 350) 350 mg iodine/mL injection    [DISCONTINUED] acetaminophen tablet 650 mg    [DISCONTINUED] albuterol-ipratropium 2.5 mg-0.5 mg/3 mL nebulizer solution 3 mL    [DISCONTINUED] aluminum-magnesium hydroxide-simethicone 200-200-20 mg/5 mL suspension 30 mL    [DISCONTINUED] apixaban tablet 10 mg    [DISCONTINUED] cefdinir capsule 300 mg    [DISCONTINUED] enoxaparin injection 80 mg    [DISCONTINUED] ketorolac injection 15 mg    [DISCONTINUED] magnesium oxide tablet 800 mg    [DISCONTINUED] magnesium oxide tablet 800 mg    [DISCONTINUED] melatonin tablet 6 mg    [DISCONTINUED] naloxone 0.4 mg/mL injection 0.02 mg    [DISCONTINUED] ondansetron injection 4 mg    [DISCONTINUED] phenazopyridine tablet 200 mg    [DISCONTINUED] potassium bicarbonate disintegrating tablet 35 mEq    [DISCONTINUED] potassium bicarbonate disintegrating tablet 50 mEq    [DISCONTINUED] potassium bicarbonate disintegrating tablet 60 mEq    [DISCONTINUED] potassium, sodium phosphates 280-160-250 mg packet 2 packet    [DISCONTINUED] potassium, sodium phosphates 280-160-250 mg packet 2 packet    [DISCONTINUED] potassium, sodium phosphates 280-160-250 mg packet 2 packet    [DISCONTINUED] senna-docusate 8.6-50 mg per tablet 1 tablet    [DISCONTINUED] sodium chloride 0.9% flush 10 mL    [DISCONTINUED] tamsulosin 24 hr capsule 0.4 mg    [DISCONTINUED] traMADoL tablet 50 mg     Current Outpatient Medications on File Prior to Encounter   Medication Sig    apixaban (ELIQUIS) 5 mg Tab Take 2 tablets (10 mg total) by mouth 2 (two) times daily for 7 days, THEN 1 tablet (5 mg total) 2 (two) times daily. Take 10mg bid x7 days, then 5mg bid.    cefdinir (OMNICEF) 300 MG capsule Take 1 capsule  (300 mg total) by mouth every 12 (twelve) hours. for 10 days    ketorolac (TORADOL) 10 mg tablet Take 10 mg by mouth every 6 (six) hours.    ondansetron (ZOFRAN-ODT) 4 MG TbDL Take 1 tablet (4 mg total) by mouth every 8 (eight) hours as needed (nausea/vomiting).    tamsulosin (FLOMAX) 0.4 mg Cap Take 1 capsule (0.4 mg total) by mouth once daily.    traMADoL (ULTRAM) 50 mg tablet Take 1 tablet (50 mg total) by mouth every 6 (six) hours as needed for Pain.     Family History       Problem Relation (Age of Onset)    COPD Maternal Grandfather    Diabetes Father, Paternal Aunt, Paternal Grandfather    Emphysema Maternal Grandfather    Hypertension Mother    Migraines Mother    Sarcoidosis Paternal Aunt          Tobacco Use    Smoking status: Never    Smokeless tobacco: Never   Substance and Sexual Activity    Alcohol use: Never    Drug use: Never    Sexual activity: Not on file     Review of Systems    Review of symptoms:    Constitutional:  Negative for fever, chills, generalized weakness, appetite change  HENT:  Negative for congestion, sore throat  Eyes:  Negative for redness, discharge  Respiratory:  Negative for cough and shortness of breath  Cardiovascular:  Positive for chest pain  Gastrointestinal:  Negative for abdominal pain, nausea, vomiting, diarrhea  Genitourinary:  Negative for flank pain, difficulty urination  Musculoskeletal:  Negative for arthralgia, myalgia  Skin:  Negative for color change  Neuro:  Negative for dizziness, focal weakness  Psychiatric:  Negative for agitation, confusion    Objective:     Vital Signs (Most Recent):  Temp: 97.8 °F (36.6 °C) (07/22/24 1927)  Pulse: (!) 114 (07/22/24 2300)  Resp: 15 (07/22/24 2300)  BP: (!) 148/81 (07/22/24 2300)  SpO2: 95 % (07/22/24 2300) Vital Signs (24h Range):  Temp:  [97.1 °F (36.2 °C)-98.6 °F (37 °C)] 97.8 °F (36.6 °C)  Pulse:  [] 114  Resp:  [15-18] 15  SpO2:  [94 %-97 %] 95 %  BP: (112-148)/(62-89) 148/81     Weight: 77.1 kg (170 lb)  Body  mass index is 25.85 kg/m².     Physical Exam    Physical examination:    General:  Awake, alert, not in apparent distress  Head:  NC/AT  HEENT:  PERRLA, EOMI, no pallor or icterus               Oral mucosa moist without exudates or erythema  Neck:  Supple, no JVD, no lymphadenopathy  Chest:  S1-S2 normal, no M/G/R  Respiratory:  Normal vesicular breath sounds with no added sounds  Abdomen:  Soft, nondistended, nontender, no organomegaly, bowel sounds present  Extremities:  No pitting edema of bilateral lower extremities present  Neuro:  Awake, alert, oriented x3, grossly intact motor and sensory exam  Psychiatric:  Normal mood and affect    Significant Labs: All pertinent labs within the past 24 hours have been reviewed.  Recent Lab Results         07/22/24 2011 07/22/24  1053   07/22/24  0350        A2C EF   62         A4C EF   56         Albumin 4.6           ALP 62           ALT 17           Anion Gap 10     13       Ao root annulus   3.46         Ascending aorta   2.85         Ao peak jose manuel   0.84         Ao VTI   14.20         PTT 31.5  Comment: Refer to local heparin nomogram for intensity/dose specific   therapeutic   range.             AST 15           AV valve area   3.50         ELLIOTT by Velocity Ratio   3.36         AORTIC VALVE CUSP SEPERATION   1.96         AV mean gradient   2         AV index (prosthetic)   0.88         AV peak gradient   3         AV Velocity Ratio   0.85         Baso # 0.06     0.06       Basophil % 1.0     0.9       BILIRUBIN TOTAL 0.4  Comment: For infants and newborns, interpretation of results should be based  on gestational age, weight and in agreement with clinical  observations.    Premature Infant recommended reference ranges:  Up to 24 hours.............<8.0 mg/dL  Up to 48 hours............<12.0 mg/dL  3-5 days..................<15.0 mg/dL  6-29 days.................<15.0 mg/dL             BNP 3  Comment: Values of less than 100 pg/ml are consistent with non-CHF  populations.           BSA   1.92         BUN 15     12       Calcium 9.5     10.2       Chloride 103     104       CO2 24     22       Creatinine 1.2     1.2       Left Ventricle Relative Wall Thickness   0.57         Differential Method Automated     Automated       E/A ratio   1.12         E/E' ratio   4.00         eGFR >60.0     >60       Eos # 0.2     0.1       Eos % 2.9     2.1       E wave deceleration time   118.64         FS   31         Glucose 111     103       Gran # (ANC) 4.2     4.3       Gran % 66.9     64.5       Hematocrit 47.6     50.7       Hemoglobin 15.8     16.4       Immature Grans (Abs) 0.02  Comment: Mild elevation in immature granulocytes is non specific and   can be seen in a variety of conditions including stress response,   acute inflammation, trauma and pregnancy. Correlation with other   laboratory and clinical findings is essential.       0.02  Comment: Mild elevation in immature granulocytes is non specific and   can be seen in a variety of conditions including stress response,   acute inflammation, trauma and pregnancy. Correlation with other   laboratory and clinical findings is essential.         Immature Granulocytes 0.3     0.3       INR 1.0  Comment: Coumadin Therapy:  2.0 - 3.0 for INR for all indicators except mechanical heart valves  and antiphospholipid syndromes which should use 2.5 - 3.5.             IVRT   133.21         IVSd   0.91         LA area A2C   13.26         LA area A4C   10.87         Left Atrium Major Axis   4.25         LA volume   28.49         LA Volume Index (Mod)   15.0         LVOT area   4.0         LV LATERAL E/E' RATIO   3.29         LV SEPTAL E/E' RATIO   5.11         LV EDV BP   41.38         LV Diastolic Volume Index   21.78         Left Ventricular End Diastolic Volume by Teichholz Method   41.38         LV EDV A2C   [id number]         LV EDV A4C   54.24         Left Ventricular End Systolic Volume by Teichholz Method   16.61         LV  "ESV A2C   32.14         LV ESV A4C   22.36         LVIDd   3.21         LVIDs   2.22         LV mass   79.60         LV Mass Index   42         Left Ventricular Outflow Tract Mean Gradient   1.41         Left Ventricular Outflow Tract Mean Velocity   0.57         LVOT diameter   2.25         LVOT peak alexis   0.71         LVOT stroke volume   49.68         LVOT peak VTI   12.50         LV ESV BP   16.61         LV Systolic Volume Index   8.7         Lymph # 1.4     1.6       Lymph % 21.4     24.0       Magnesium      2.3       MCH 30.2     29.7       MCHC 33.2     32.3       MCV 91     92       Mean e'   0.12         Mono # 0.5     0.5       Mono % 7.5     8.2       MPV 9.1     9.0       Mr max alexis   4.10         MV valve area p 1/2 method   5.15         MV "A" wave duration   [id number]         MV valve area by continuity eq   4.32         MV mean gradient   1         MV peak gradient   1         MV Peak A Alexis   0.41         MV Peak E Alexis   0.46         MV stenosis pressure 1/2 time   42.73         MV VTI   11.5         nRBC 0     0       R[employer]'s Biplane MOD Ejection Fraction   61         Phosphorus Level     3.7       Platelet Count 287     329       Potassium 4.3     4.1       PROTEIN TOTAL 7.8           PT 11.1           Pulmonary Valve Mean Velocity   0.54         PV peak gradient   2         PV Peak D Alexis   0.37         PV Peak S Alexis   0.45         PV PEAK VELOCITY   0.69         Pulm vein S/D ratio   1.22         Posterior Wall   0.92         RA Major Axis   4.20         Est. RA pres   3         RBC 5.24     5.52       RDW 12.0     12.0       RV S'   15.23         RV TB RVSP   5         RV/LV Ratio   1.10         RVDD   3.52         RV- betancourt basal diam   3.6         RV-betancourt length   5.7         Right ventricular length in diastole (apical 4-chamber view)   5.72         Sodium 137     139       STJ   2.70         TAPSE   2.21         TDI SEPTAL   0.09         TDI LATERAL   0.14         Triscuspid " Valve Regurgitation Peak Gradient   23         TR Max Alexis   2.40         Troponin I High Sensitivity <2.3  Comment: Troponin results differ between methods. Do not use   results between Troponin methods interchangeably.    Alkaline Phospatase levels above 400 U/L may   cause false positive results.    Access hsTnI should not be used for patients taking   Asfotase dejah (Strensiq).             TV resting pulmonary artery pressure   26         WBC 6.30     6.62       ZLVIDD   -5.07         ZLVIDS   -3.10                 Significant Imaging: I have reviewed all pertinent imaging results/findings within the past 24 hours.  I have reviewed and interpreted all pertinent imaging results/findings within the past 24 hours.  Assessment/Plan:     # Bilateral pulmonary embolism:   CTA chest 07/21/2024:  Small tertiary artery pulmonary emboli bilaterally   Echo 07/22/2024:  EF 55-60%, normal systolic function of right ventricle   Ultrasound lower extremities negative for DVT   Continue Eliquis 10 mg b.i.d. for 7 days and then 5 mg b.i.d.     # presyncope:  Likely vasovagal   ECHO 07/22/2024:  EF 55-60%, normal size and systolic function of right ventricle, mild TR   Consider repeat CTA chest if persistent symptoms   Continue to monitor     # nephrolithiasis:   Status post left ureteral stent   Continue cefdinir    # code:  Full code  # diet:  Adult regular diet  # DVT prophylaxis:  On Eliquis        On 07/22/2024, patient should be placed in hospital observation services under my care.             Raghavendra George MD  Department of Hospital Medicine  [facility] - Emergency Dept

## 2024-07-23 NOTE — SUBJECTIVE & OBJECTIVE
Past Medical History:   Diagnosis Date    Nephrolithiasis        Past Surgical History:   Procedure Laterality Date    CYSTOURETEROSCOPY, WITH HOLMIUM LASER LITHOTRIPSY OF URETERAL CALCULUS AND STENT INSERTION Left 7/5/2024    Procedure: CYSTOURETEROSCOPY, WITH HOLMIUM LASER LITHOTRIPSY OF URETERAL CALCULUS AND STENT INSERTION;  Surgeon: Milana Encinas MD;  Location: [facility];  Service: Urology;  Laterality: Left;    URETEROSCOPIC REMOVAL OF URETERIC CALCULUS N/A 7/5/2024    Procedure: REMOVAL, CALCULUS, URETER, URETEROSCOPIC;  Surgeon: Milana Encinas MD;  Location: [facility];  Service: Urology;  Laterality: N/A;       Review of patient's allergies indicates:   Allergen Reactions    Aspirin Palpitations and Shortness Of Breath    Hydrocodone     Penicillins        Current Facility-Administered Medications on File Prior to Encounter   Medication    [COMPLETED] iohexoL (OMNIPAQUE 350) 350 mg iodine/mL injection    [DISCONTINUED] acetaminophen tablet 650 mg    [DISCONTINUED] albuterol-ipratropium 2.5 mg-0.5 mg/3 mL nebulizer solution 3 mL    [DISCONTINUED] aluminum-magnesium hydroxide-simethicone 200-200-20 mg/5 mL suspension 30 mL    [DISCONTINUED] apixaban tablet 10 mg    [DISCONTINUED] cefdinir capsule 300 mg    [DISCONTINUED] enoxaparin injection 80 mg    [DISCONTINUED] ketorolac injection 15 mg    [DISCONTINUED] magnesium oxide tablet 800 mg    [DISCONTINUED] magnesium oxide tablet 800 mg    [DISCONTINUED] melatonin tablet 6 mg    [DISCONTINUED] naloxone 0.4 mg/mL injection 0.02 mg    [DISCONTINUED] ondansetron injection 4 mg    [DISCONTINUED] phenazopyridine tablet 200 mg    [DISCONTINUED] potassium bicarbonate disintegrating tablet 35 mEq    [DISCONTINUED] potassium bicarbonate disintegrating tablet 50 mEq    [DISCONTINUED] potassium bicarbonate disintegrating tablet 60 mEq    [DISCONTINUED] potassium, sodium phosphates 280-160-250 mg packet 2 packet    [DISCONTINUED] potassium, sodium phosphates  280-160-250 mg packet 2 packet    [DISCONTINUED] potassium, sodium phosphates 280-160-250 mg packet 2 packet    [DISCONTINUED] senna-docusate 8.6-50 mg per tablet 1 tablet    [DISCONTINUED] sodium chloride 0.9% flush 10 mL    [DISCONTINUED] tamsulosin 24 hr capsule 0.4 mg    [DISCONTINUED] traMADoL tablet 50 mg     Current Outpatient Medications on File Prior to Encounter   Medication Sig    apixaban (ELIQUIS) 5 mg Tab Take 2 tablets (10 mg total) by mouth 2 (two) times daily for 7 days, THEN 1 tablet (5 mg total) 2 (two) times daily. Take 10mg bid x7 days, then 5mg bid.    cefdinir (OMNICEF) 300 MG capsule Take 1 capsule (300 mg total) by mouth every 12 (twelve) hours. for 10 days    ketorolac (TORADOL) 10 mg tablet Take 10 mg by mouth every 6 (six) hours.    ondansetron (ZOFRAN-ODT) 4 MG TbDL Take 1 tablet (4 mg total) by mouth every 8 (eight) hours as needed (nausea/vomiting).    tamsulosin (FLOMAX) 0.4 mg Cap Take 1 capsule (0.4 mg total) by mouth once daily.    traMADoL (ULTRAM) 50 mg tablet Take 1 tablet (50 mg total) by mouth every 6 (six) hours as needed for Pain.     Family History       Problem Relation (Age of Onset)    COPD Maternal Grandfather    Diabetes Father, Paternal Aunt, Paternal Grandfather    Emphysema Maternal Grandfather    Hypertension Mother    Migraines Mother    Sarcoidosis Paternal Aunt          Tobacco Use    Smoking status: Never    Smokeless tobacco: Never   Substance and Sexual Activity    Alcohol use: Never    Drug use: Never    Sexual activity: Not on file     Review of Systems    Review of symptoms:    Constitutional:  Negative for fever, chills, generalized weakness, appetite change  HENT:  Negative for congestion, sore throat  Eyes:  Negative for redness, discharge  Respiratory:  Negative for cough and shortness of breath  Cardiovascular:  Positive for chest pain  Gastrointestinal:  Negative for abdominal pain, nausea, vomiting, diarrhea  Genitourinary:  Negative for flank pain,  difficulty urination  Musculoskeletal:  Negative for arthralgia, myalgia  Skin:  Negative for color change  Neuro:  Negative for dizziness, focal weakness  Psychiatric:  Negative for agitation, confusion    Objective:     Vital Signs (Most Recent):  Temp: 97.8 °F (36.6 °C) (07/22/24 1927)  Pulse: (!) 114 (07/22/24 2300)  Resp: 15 (07/22/24 2300)  BP: (!) 148/81 (07/22/24 2300)  SpO2: 95 % (07/22/24 2300) Vital Signs (24h Range):  Temp:  [97.1 °F (36.2 °C)-98.6 °F (37 °C)] 97.8 °F (36.6 °C)  Pulse:  [] 114  Resp:  [15-18] 15  SpO2:  [94 %-97 %] 95 %  BP: (112-148)/(62-89) 148/81     Weight: 77.1 kg (170 lb)  Body mass index is 25.85 kg/m².     Physical Exam    Physical examination:    General:  Awake, alert, not in apparent distress  Head:  NC/AT  HEENT:  PERRLA, EOMI, no pallor or icterus               Oral mucosa moist without exudates or erythema  Neck:  Supple, no JVD, no lymphadenopathy  Chest:  S1-S2 normal, no M/G/R  Respiratory:  Normal vesicular breath sounds with no added sounds  Abdomen:  Soft, nondistended, nontender, no organomegaly, bowel sounds present  Extremities:  No pitting edema of bilateral lower extremities present  Neuro:  Awake, alert, oriented x3, grossly intact motor and sensory exam  Psychiatric:  Normal mood and affect    Significant Labs: All pertinent labs within the past 24 hours have been reviewed.  Recent Lab Results         07/22/24 2011 07/22/24  1053   07/22/24  0350        A2C EF   62         A4C EF   56         Albumin 4.6           ALP 62           ALT 17           Anion Gap 10     13       Ao root annulus   3.46         Ascending aorta   2.85         Ao peak jose manuel   0.84         Ao VTI   14.20         PTT 31.5  Comment: Refer to local heparin nomogram for intensity/dose specific   therapeutic   range.             AST 15           AV valve area   3.50         ELLIOTT by Velocity Ratio   3.36         AORTIC VALVE CUSP SEPERATION   1.96         AV mean gradient   2         AV  index (prosthetic)   0.88         AV peak gradient   3         AV Velocity Ratio   0.85         Baso # 0.06     0.06       Basophil % 1.0     0.9       BILIRUBIN TOTAL 0.4  Comment: For infants and newborns, interpretation of results should be based  on gestational age, weight and in agreement with clinical  observations.    Premature Infant recommended reference ranges:  Up to 24 hours.............<8.0 mg/dL  Up to 48 hours............<12.0 mg/dL  3-5 days..................<15.0 mg/dL  6-29 days.................<15.0 mg/dL             BNP 3  Comment: Values of less than 100 pg/ml are consistent with non-CHF populations.           BSA   1.92         BUN 15     12       Calcium 9.5     10.2       Chloride 103     104       CO2 24     22       Creatinine 1.2     1.2       Left Ventricle Relative Wall Thickness   0.57         Differential Method Automated     Automated       E/A ratio   1.12         E/E' ratio   4.00         eGFR >60.0     >60       Eos # 0.2     0.1       Eos % 2.9     2.1       E wave deceleration time   118.64         FS   31         Glucose 111     103       Gran # (ANC) 4.2     4.3       Gran % 66.9     64.5       Hematocrit 47.6     50.7       Hemoglobin 15.8     16.4       Immature Grans (Abs) 0.02  Comment: Mild elevation in immature granulocytes is non specific and   can be seen in a variety of conditions including stress response,   acute inflammation, trauma and pregnancy. Correlation with other   laboratory and clinical findings is essential.       0.02  Comment: Mild elevation in immature granulocytes is non specific and   can be seen in a variety of conditions including stress response,   acute inflammation, trauma and pregnancy. Correlation with other   laboratory and clinical findings is essential.         Immature Granulocytes 0.3     0.3       INR 1.0  Comment: Coumadin Therapy:  2.0 - 3.0 for INR for all indicators except mechanical heart valves  and antiphospholipid syndromes which  "should use 2.5 - 3.5.             IVRT   133.21         IVSd   0.91         LA area A2C   13.26         LA area A4C   10.87         Left Atrium Major Axis   4.25         LA volume   28.49         LA Volume Index (Mod)   15.0         LVOT area   4.0         LV LATERAL E/E' RATIO   3.29         LV SEPTAL E/E' RATIO   5.11         LV EDV BP   41.38         LV Diastolic Volume Index   21.78         Left Ventricular End Diastolic Volume by Teichholz Method   41.38         LV EDV A2C   [id number]         LV EDV A4C   54.24         Left Ventricular End Systolic Volume by Teichholz Method   16.61         LV ESV A2C   32.14         LV ESV A4C   22.36         LVIDd   3.21         LVIDs   2.22         LV mass   79.60         LV Mass Index   42         Left Ventricular Outflow Tract Mean Gradient   1.41         Left Ventricular Outflow Tract Mean Velocity   0.57         LVOT diameter   2.25         LVOT peak alexis   0.71         LVOT stroke volume   49.68         LVOT peak VTI   12.50         LV ESV BP   16.61         LV Systolic Volume Index   8.7         Lymph # 1.4     1.6       Lymph % 21.4     24.0       Magnesium      2.3       MCH 30.2     29.7       MCHC 33.2     32.3       MCV 91     92       Mean e'   0.12         Mono # 0.5     0.5       Mono % 7.5     8.2       MPV 9.1     9.0       Mr max alexis   4.10         MV valve area p 1/2 method   5.15         MV "A" wave duration   102.646860390329536         MV valve area by continuity eq   4.32         MV mean gradient   1         MV peak gradient   1         MV Peak A Alexis   0.41         MV Peak E Alexis   0.46         MV stenosis pressure 1/2 time   42.73         MV VTI   11.5         nRBC 0     0       Ragsdale's Biplane MOD Ejection Fraction   61         Phosphorus Level     3.7       Platelet Count 287     329       Potassium 4.3     4.1       PROTEIN TOTAL 7.8           PT 11.1           Pulmonary Valve Mean Velocity   0.54         PV peak gradient   2         PV " Peak D Alexis   0.37         PV Peak S Alexis   0.45         PV PEAK VELOCITY   0.69         Pulm vein S/D ratio   1.22         Posterior Wall   0.92         RA Major Axis   4.20         Est. RA pres   3         RBC 5.24     5.52       RDW 12.0     12.0       RV S'   15.23         RV TB RVSP   5         RV/LV Ratio   1.10         RVDD   3.52         RV- betancourt basal diam   3.6         RV-betancourt length   5.7         Right ventricular length in diastole (apical 4-chamber view)   5.72         Sodium 137     139       STJ   2.70         TAPSE   2.21         TDI SEPTAL   0.09         TDI LATERAL   0.14         Triscuspid Valve Regurgitation Peak Gradient   23         TR Max Alexis   2.40         Troponin I High Sensitivity <2.3  Comment: Troponin results differ between methods. Do not use   results between Troponin methods interchangeably.    Alkaline Phospatase levels above 400 U/L may   cause false positive results.    Access hsTnI should not be used for patients taking   Asfotase dejah (Strensiq).             TV resting pulmonary artery pressure   26         WBC 6.30     6.62       ZLVIDD   -5.07         ZLVIDS   -3.10                 Significant Imaging: I have reviewed all pertinent imaging results/findings within the past 24 hours.  I have reviewed and interpreted all pertinent imaging results/findings within the past 24 hours.

## 2024-07-23 NOTE — HOSPITAL COURSE
Patient was admitted to the hospital medicine service.  His symptoms resolved.  Troponins were negative.  Echo was not repeated as it has been done recently and was normal.  CTA was repeated and cooperative and had decreased.  Patient was discharged home.  He will follow up with PCP and Urology.  He expressed understanding of discharge plan.  Return precautions discussed.

## 2024-07-23 NOTE — DISCHARGE SUMMARY
[facility] Hospital Medicine  Discharge Summary      Patient Name: Yovani Melvin  MRN: [id number]  TUCKER: [id number]  Patient Class: OP- Observation  Admission Date: 7/22/2024  Hospital Length of Stay: 0 days  Discharge Date and Time: No discharge date for patient encounter.  Attending Physician: Isi Cole MD   Discharging Provider: Isi Cole MD  Primary Care Provider: Mumtaz Fountain FNP-C    Primary Care Team: Networked reference to record PCT     HPI:     The patient is a 39-year-old male with past medical history of nephrolithiasis s/p left ureteral stent who presented to the ED for the evaluation of chest pain.    The patient reports that he was discharged around 3:00 p.m. today from [facility].  He reports that he was diagnosed of bilateral PE yesterday and was discharged on Eliquis.  He reports that he was given Lovenox shots twice during the hospital stay.  The patient reports that after going, he had right-sided chest pain which shifted to the center.  He also reports that he felt like he was going to pass out.  He denies any shortness of breath, nausea, vomiting.  He denies any calf pain. He denies any family history of blood clots.    * No surgery found *      Hospital Course:   Patient was admitted to the hospital medicine service.  His symptoms resolved.  Troponins were negative.  Echo was not repeated as it has been done recently and was normal.  CTA was repeated and cooperative and had decreased.  Patient was discharged home.  He will follow up with PCP and Urology.  He expressed understanding of discharge plan.  Return precautions discussed.     Goals of Care Treatment Preferences:  Code Status: Full Code      Consults:     No new Assessment & Plan notes have been filed under this hospital service since the last note was generated.  Service: Hospital Medicine    Final Active Diagnoses:    Diagnosis Date Noted POA    PRINCIPAL PROBLEM:  Bilateral pulmonary  embolism [I26.99] 07/21/2024 Yes      Problems Resolved During this Admission:       Discharged Condition: good    Disposition: Home or Self Care    Follow Up:   Follow-up Information       Mumtaz Fountain FNP-C Follow up in 1 week(s).    Specialty: Family Medicine  Contact information:  [address] [facility]  [address]  [place] [address]  [phone]                           Patient Instructions:      Notify your health care provider if you experience any of the following:  temperature >100.4     Notify your health care provider if you experience any of the following:  difficulty breathing or increased cough     Notify your health care provider if you experience any of the following:  severe uncontrolled pain     Activity as tolerated       Significant Diagnostic Studies: Labs: BMP:   Recent Labs   Lab 07/22/24  0350 07/22/24 2011 07/23/24  0337    111* 108    137 137   K 4.1 4.3 4.0    103 105   CO2 22* 24 19*   BUN 12 15 14   CREATININE 1.2 1.2 1.0   CALCIUM 10.2 9.5 8.8   MG 2.3  --   --     and CMP   Recent Labs   Lab 07/22/24  0350 07/22/24 2011 07/23/24  0337    137 137   K 4.1 4.3 4.0    103 105   CO2 22* 24 19*    111* 108   BUN 12 15 14   CREATININE 1.2 1.2 1.0   CALCIUM 10.2 9.5 8.8   PROT  --  7.8 6.9   ALBUMIN  --  4.6 4.2   BILITOT  --  0.4 0.5   ALKPHOS  --  62 53*   AST  --  15 15   ALT  --  17 15   ANIONGAP 13 10 13     CTA Chest Non-Coronary (PE Studies)  Order: [id number]  Status: Final result       Visible to patient: Yes (seen)       Next appt: 07/24/2024 at 08:30 AM in Urology (Milana Encinas MD)    0 Result Notes  Details    Reading Physician Reading Date Result Priority   Connie Sky MD  [phone] 7/23/2024 Pending Discharge     Narrative & Impression     CMS MANDATED QUALITY DATA - CT RADIATION - 436     All CT scans at this facility utilize dose modulation, iterative reconstruction, and/or weight based dosing when appropriate to  reduce radiation dose to as low as reasonably achievable.     CLINICAL HISTORY:  ([id number])40 y/o  (1985) M     Pulmonary embolism (PE) suspected, positive D-dimer;known PE, worsening symptoms;     TECHNIQUE:  (A#[id number], exam time 7/23/2024 14:35)     CTA CHEST NON CORONARY (PE STUDIES) [id number]     Axial CT examination of the chest with attention to the pulmonary arteries was performed using contiguous axial images from the diaphragms to the lung apices following the intravenous administration of 100 cc Omnipaque 350 non-ionic contrast material. Images were reviewed using lung, mediastinal, and bone windows. The study was performed with thin sections with subsequent 3-D reformats/MIP/reconstructions in multiple planes.     COMPARISON:  07/21/2024     FINDINGS:  CTA PE evaluation: This is a high quality study for the evaluation of pulmonary embolism . The pulmonary arteries are normal in appearance without pulmonary emboli noted up to the segmental level, noting the limitations of CT technique for identifying small or isolated subsegmental emboli.  The previously noted probable small tertiary artery pulmonary emboli in the lung bases is no longer identified.     CT Chest:     Visualized neck: normal     Lungs: Atelectasis in lung bases and lingula.  There are no confluent infiltrates or pulmonary nodules.     Airway: The trachea and central bronchial tree appear normal.     Pleura: There is no pleural effusion. There is no pneumothorax.     Cardiovascular: Heart is normal in size.  There is no pericardial effusion.  The aorta is normal in caliber.     Mediastinum: No adenopathy.     Soft tissues: The peripheral soft tissues appear normal.     Musculoskeletal: normal     Esophagus: normal     Upper Abdomen: visualized     Impression:     1.  Resolution of the tertiary pulmonary emboli in the lung bases.  No new pulmonary emboli.     2.  Atelectasis in lung bases and lingula.        Electronically signed  by:Connie Sky  Date:                                            07/23/2024  Time:                                           14:44     Pending Diagnostic Studies:       None           Medications:  Reconciled Home Medications:      Medication List        START taking these medications      cefdinir 300 MG capsule  Commonly known as: OMNICEF  Take 1 capsule (300 mg total) by mouth every 12 (twelve) hours. for 10 days            CONTINUE taking these medications      apixaban 5 mg Tab  Commonly known as: ELIQUIS  Take 2 tablets (10 mg total) by mouth 2 (two) times daily for 7 days, THEN 1 tablet (5 mg total) 2 (two) times daily. Take 10mg bid x7 days, then 5mg bid.  Start taking on: July 22, 2024     ketorolac 10 mg tablet  Commonly known as: TORADOL  Take 10 mg by mouth every 6 (six) hours.     ondansetron 4 MG Tbdl  Commonly known as: ZOFRAN-ODT  Take 1 tablet (4 mg total) by mouth every 8 (eight) hours as needed (nausea/vomiting).     tamsulosin 0.4 mg Cap  Commonly known as: FLOMAX  Take 1 capsule (0.4 mg total) by mouth once daily.     traMADoL 50 mg tablet  Commonly known as: ULTRAM  Take 1 tablet (50 mg total) by mouth every 6 (six) hours as needed for Pain.              Indwelling Lines/Drains at time of discharge:   Lines/Drains/Airways       Drain  Duration                  Ureteral Drain/Stent 07/05/24 1249 Left ureter 6 Fr. 18 days                    Time spent on the discharge of patient: 35 minutes         Isi Cole MD  Department of Hospital Medicine  [facility]

## 2024-07-23 NOTE — PLAN OF CARE
Met with patient at bedside to complete initial assessment. Patient / family reports patient DOES NOT have a living will and NO ONE is medical POA.     Estela Melvin (Spouse) [phone] (Mobile)  will take patient home at /[facility]  Initial Discharge Assessment       Primary Care Provider: Mumtaz Fountain FNP-C    Admission Diagnosis: Chest pain [R07.9]    Admission Date: 7/22/2024  Expected Discharge Date: 7/24/2024    Transition of Care Barriers: None    Payor: BLUE CROSS BLUE SHIELD / Plan: [employer] [place] / Product Type: Commercial /     Extended Emergency Contact Information  Primary Emergency Contact: Estela Melvin  Address: [facility]  Home Phone: [phone]  Mobile Phone: [phone]  Relation: Spouse  Preferred language: English   needed? No    Discharge Plan A: Home with family  Discharge Plan B: Home with family      [facility] [address] [facility] [place] [address]  [address] [address] [address]  Phone: [phone] Fax: [phone]      Initial Assessment (most recent)       Adult Discharge Assessment - 07/23/24 1420          Discharge Assessment    Assessment Type Final Discharge Note     Confirmed/corrected address, phone number and insurance Yes     Confirmed Demographics Correct on Facesheet     Source of Information patient     Communicated PEE with patient/caregiver Date not available/Unable to determine     Reason For Admission bilateral pulmonary embolism     People in Home spouse     Facility Arrived From: home     Do you expect to return to your current living situation? Yes     Do you have help at home or someone to help you manage your care at home? Yes     Who are your caregiver(s) and their phone number(s)? Estela Melvin (Spouse)  [phone] (Mobile)     Current cognitive status: Alert/Oriented     Walking or Climbing Stairs Difficulty  no     Dressing/Bathing Difficulty no     Equipment Currently Used at Home none     Readmission within 30 days? Yes     Patient currently being followed by outpatient case management? No     Do you currently have service(s) that help you manage your care at home? No     Do you have prescription coverage? Yes     Coverage BCBS     Who is going to help you get home at discharge? Estela Melvin (Spouse)  [phone] (Mobile)     How do you get to doctors appointments? car, drives self     Are you on dialysis? No     Do you take coumadin? No     Discharge Plan A Home with family     Discharge Plan B Home with family     DME Needed Upon Discharge  none     Discharge Plan discussed with: Patient     Transition of Care Barriers None        OTHER    Name(s) of People in Home Estela Melvin (Spouse)  [phone] (Mobile)

## 2024-07-23 NOTE — ED PROVIDER NOTES
"Encounter Date: 2024       History     Chief Complaint   Patient presents with    Chest Pain     Pt wife said pt having "terrible chest pain was blacking out and having poor capillary refill"     HPI    39-year-old male with past medical history of recently diagnosed pulmonary embolism nephrolithiasis status post ureteral stent placement presents to the emergency department with complaint of chest pain and shortness of breath.  Patient was recently admitted at [facility] my diagnosed with pulmonary embolism and was started on heparin drip.  On chart review he had a DVT ultrasound performed that was negative for deep venous thrombosis.  Patient was transitioned to Eliquis.  There was concern for UTI though his urine cultures returned negative.  After transition to Eliquis patient was discharged home.  Patient states that after discharge he continued to experience palpitations.  He states that on attempt to ambulate at home.  Began to experience chest pain, diaphoresis, shortness for breath and lightheadedness.  He denies any syncopal event.    Of note patient does endorse family history of sudden cardiac death stating his grandmother  in her 30s while obtaining catheterization.  Also endorses a family history of thoracic aortic aneurysm from his mother.  Review of patient's allergies indicates:   Allergen Reactions    Aspirin Palpitations and Shortness Of Breath    Hydrocodone     Penicillins      Past Medical History:   Diagnosis Date    Nephrolithiasis      Past Surgical History:   Procedure Laterality Date    CYSTOURETEROSCOPY, WITH HOLMIUM LASER LITHOTRIPSY OF URETERAL CALCULUS AND STENT INSERTION Left 2024    Procedure: CYSTOURETEROSCOPY, WITH HOLMIUM LASER LITHOTRIPSY OF URETERAL CALCULUS AND STENT INSERTION;  Surgeon: Milana Encinas MD;  Location: [facility];  Service: Urology;  Laterality: Left;    URETEROSCOPIC REMOVAL OF URETERIC CALCULUS N/A 2024    Procedure: REMOVAL, " CALCULUS, URETER, URETEROSCOPIC;  Surgeon: Milana Encinas MD;  Location: [facility];  Service: Urology;  Laterality: N/A;     Family History   Problem Relation Name Age of Onset    Migraines Mother      Hypertension Mother      Diabetes Father      Diabetes Paternal Aunt      Sarcoidosis Paternal Aunt      Diabetes Paternal Grandfather      Emphysema Maternal Grandfather      COPD Maternal Grandfather       Social History     Tobacco Use    Smoking status: Never    Smokeless tobacco: Never   Substance Use Topics    Alcohol use: Never    Drug use: Never     Review of Systems   Constitutional:  Positive for diaphoresis. Negative for fever.   HENT:  Negative for sore throat.    Respiratory:  Positive for shortness of breath.    Cardiovascular:  Positive for chest pain.   Gastrointestinal:  Negative for nausea.   Genitourinary:  Negative for dysuria.   Musculoskeletal:  Negative for back pain.   Skin:  Negative for rash.   Neurological:  Positive for light-headedness. Negative for weakness.   Hematological:  Does not bruise/bleed easily.       Physical Exam     Initial Vitals [07/22/24 1927]   BP Pulse Resp Temp SpO2   116/89 103 18 97.8 °F (36.6 °C) (!) 94 %      MAP       --         Physical Exam    Vitals reviewed.  Constitutional: He appears well-developed and well-nourished.  Non-toxic appearance.   HENT:   Head: Normocephalic and atraumatic.   Eyes: EOM are normal. Pupils are equal, round, and reactive to light.   Neck: Neck supple.   Normal range of motion.  Cardiovascular:  Regular rhythm.           Tachycardic heart rate in the 110s to 120s   Pulmonary/Chest: Breath sounds normal. No respiratory distress.   On 2 L of oxygen nasal cannula   Musculoskeletal:      Cervical back: Normal range of motion and neck supple.      Comments: 2+ DP pulses bilaterally  No lower extremity edema  No calf tenderness     Neurological: He is alert and oriented to person, place, and time.   Skin: Skin is warm and dry.          ED Course   Procedures  Labs Reviewed   CBC W/ AUTO DIFFERENTIAL - Abnormal       Result Value    WBC 6.30      RBC 5.24      Hemoglobin 15.8      Hematocrit 47.6      MCV 91      MCH 30.2      MCHC 33.2      RDW 12.0      Platelets 287      MPV 9.1 (*)     Immature Granulocytes 0.3      Gran # (ANC) 4.2      Immature Grans (Abs) 0.02      Lymph # 1.4      Mono # 0.5      Eos # 0.2      Baso # 0.06      nRBC 0      Gran % 66.9      Lymph % 21.4      Mono % 7.5      Eosinophil % 2.9      Basophil % 1.0      Differential Method Automated     COMPREHENSIVE METABOLIC PANEL - Abnormal    Sodium 137      Potassium 4.3      Chloride 103      CO2 24      Glucose 111 (*)     BUN 15      Creatinine 1.2      Calcium 9.5      Total Protein 7.8      Albumin 4.6      Total Bilirubin 0.4      Alkaline Phosphatase 62      AST 15      ALT 17      eGFR >60.0      Anion Gap 10     B-TYPE NATRIURETIC PEPTIDE    BNP 3     PROTIME-INR    Prothrombin Time 11.1      INR 1.0     APTT    aPTT 31.5     TROPONIN I HIGH SENSITIVITY    Troponin I High Sensitivity <2.3            Imaging Results    None          Medications - No data to display  Medical Decision Making    In brief, 39-year-old male with past medical history of recently diagnosed pulmonary embolism nephrolithiasis status post ureteral stent placement presents to the emergency department with complaint of chest pain and shortness of breath.  Was discharged from outside hospital today and arriving home with ambulation experienced worsening of his symptoms associated with diaphoresis, lightheadedness and chest pain/shortness of breath.  Initial vital signs significant for hypoxia with oxygen saturation 92% and tachycardia with heart rate in the 110s.  Physical examination as stated above.    Chart Review:  Chart review as stated in HPI    Collateral information:  Collateral information obtained by patient's wife at bedside    /67 (BP Location: Left arm, Patient  "Position: Lying)   Pulse 78   Temp 97.9 °F (36.6 °C) (Oral)   Resp 15   Ht 5' 8" (1.727 m)   Wt 74.3 kg (163 lb 11.2 oz)   SpO2 (!) 94%   BMI 24.89 kg/m²     Differentials:  Extension of pulmonary embolism, ACS, arrhythmia, heart failure, pneumothorax, among others.    Orders:  CBC, CMP, BNP, PT INR, PTT, troponin, EKG     EKG:  EKG normal sinus rhythm at a rate of 85 beats per minute without acute ST elevation or depression.  Normal axis.  Normal intervals.    Results:  CBC, CMP, BNP, PTT, PT INR, troponin all returned within normal limits.        Interventions:  Placed on nasal cannula    Plan:  Patient took his Eliquis dose prior to arrival to our facility.  Attempted to obtain repeat CT PE to monitor for possible extension of blood clots given patient's acute worsening of his symptoms though radiology states given patient recently had a CTP performed he would need to have it done tomorrow.  Patient was ultimately consulted to medicine for admission for hypoxia in the setting of PE .      This text was transcribed using voice software.    MDM MATRIX SUMMARY    Mallorie Llanos MD  PGY-4 [facility]  5:32 AM 7/23/2024                                    Clinical Impression:  Final diagnoses:  [R07.9] Chest pain                 Mallorie Llanos MD  Resident  07/23/24 [id number]    "

## 2024-07-23 NOTE — NURSING
Patient discharged per orders, all instructions reviewed with patient and patient verbalized understanding.  IV discontinued x 1, Tele box discontinued x 1.  Patient transferred to wheelchair x 1, all belongings by patients side.  Patient's spouse to transport him home, no complaints of pain voiced at this time, no signs of distress noted.

## 2024-07-23 NOTE — PLAN OF CARE
Problem: Adult Inpatient Plan of Care  Goal: Plan of Care Review  Outcome: Met  Goal: Patient-Specific Goal (Individualized)  Outcome: Met  Goal: Absence of Hospital-Acquired Illness or Injury  Outcome: Met  Goal: Optimal Comfort and Wellbeing  Outcome: Met  Goal: Readiness for Transition of Care  Outcome: Met     Problem: Infection  Goal: Absence of Infection Signs and Symptoms  Outcome: Met     Problem: Fall Injury Risk  Goal: Absence of Fall and Fall-Related Injury  Outcome: Met     Problem: Pain Acute  Goal: Optimal Pain Control and Function  Outcome: Met

## 2024-07-24 ENCOUNTER — PROCEDURE VISIT (OUTPATIENT)
Dept: UROLOGY | Facility: CLINIC | Age: 39
End: 2024-07-24
Payer: COMMERCIAL

## 2024-07-24 VITALS — HEIGHT: 68 IN | BODY MASS INDEX: 24.89 KG/M2

## 2024-07-24 DIAGNOSIS — N20.1 URETERAL STONE: Primary | ICD-10-CM

## 2024-07-25 ENCOUNTER — TELEPHONE (OUTPATIENT)
Dept: PSYCHIATRY | Facility: CLINIC | Age: 39
End: 2024-07-25
Payer: COMMERCIAL

## 2024-07-25 NOTE — TELEPHONE ENCOUNTER
Pt called requesting to schedule an appt. Advised him that we require a referral from an [facility] PCP. Explained that we have an extensive wait list. He verbalized understanding and will discuss with PCP at upcoming visit.

## 2024-07-26 LAB
BACTERIA BLD CULT: NORMAL
BACTERIA BLD CULT: NORMAL

## 2024-07-30 ENCOUNTER — OFFICE VISIT (OUTPATIENT)
Dept: FAMILY MEDICINE | Facility: CLINIC | Age: 39
End: 2024-07-30
Payer: COMMERCIAL

## 2024-07-30 ENCOUNTER — OFFICE VISIT (OUTPATIENT)
Dept: HEMATOLOGY/ONCOLOGY | Facility: CLINIC | Age: 39
End: 2024-07-30
Payer: COMMERCIAL

## 2024-07-30 VITALS
OXYGEN SATURATION: 99 % | DIASTOLIC BLOOD PRESSURE: 88 MMHG | BODY MASS INDEX: 24.06 KG/M2 | RESPIRATION RATE: 16 BRPM | HEIGHT: 68 IN | WEIGHT: 158.75 LBS | HEART RATE: 96 BPM | TEMPERATURE: 98 F | SYSTOLIC BLOOD PRESSURE: 133 MMHG

## 2024-07-30 VITALS
SYSTOLIC BLOOD PRESSURE: 120 MMHG | WEIGHT: 157 LBS | HEART RATE: 108 BPM | HEIGHT: 68 IN | DIASTOLIC BLOOD PRESSURE: 84 MMHG | BODY MASS INDEX: 23.79 KG/M2 | OXYGEN SATURATION: 97 %

## 2024-07-30 DIAGNOSIS — I26.99 BILATERAL PULMONARY EMBOLISM: ICD-10-CM

## 2024-07-30 DIAGNOSIS — I26.99 ACUTE PULMONARY EMBOLISM WITHOUT ACUTE COR PULMONALE, UNSPECIFIED PULMONARY EMBOLISM TYPE: Primary | ICD-10-CM

## 2024-07-30 DIAGNOSIS — F41.1 GENERALIZED ANXIETY DISORDER: ICD-10-CM

## 2024-07-30 DIAGNOSIS — R31.9 HEMATURIA, UNSPECIFIED TYPE: ICD-10-CM

## 2024-07-30 DIAGNOSIS — I26.99 ACUTE PULMONARY EMBOLISM WITHOUT ACUTE COR PULMONALE, UNSPECIFIED PULMONARY EMBOLISM TYPE: ICD-10-CM

## 2024-07-30 DIAGNOSIS — Z09 HOSPITAL DISCHARGE FOLLOW-UP: Primary | ICD-10-CM

## 2024-07-30 DIAGNOSIS — I26.99 BILATERAL PULMONARY EMBOLISM: Primary | ICD-10-CM

## 2024-07-30 DIAGNOSIS — R00.0 TACHYCARDIA: ICD-10-CM

## 2024-07-30 PROCEDURE — 3079F DIAST BP 80-89 MM HG: CPT | Mod: CPTII,S$GLB,, | Performed by: NURSE PRACTITIONER

## 2024-07-30 PROCEDURE — 3075F SYST BP GE 130 - 139MM HG: CPT | Mod: CPTII,S$GLB,, | Performed by: INTERNAL MEDICINE

## 2024-07-30 PROCEDURE — 1159F MED LIST DOCD IN RCRD: CPT | Mod: CPTII,S$GLB,, | Performed by: NURSE PRACTITIONER

## 2024-07-30 PROCEDURE — 99999 PR PBB SHADOW E&M-EST. PATIENT-LVL IV: CPT | Mod: PBBFAC,,, | Performed by: NURSE PRACTITIONER

## 2024-07-30 PROCEDURE — 1160F RVW MEDS BY RX/DR IN RCRD: CPT | Mod: CPTII,S$GLB,, | Performed by: NURSE PRACTITIONER

## 2024-07-30 PROCEDURE — 99205 OFFICE O/P NEW HI 60 MIN: CPT | Mod: S$GLB,,, | Performed by: INTERNAL MEDICINE

## 2024-07-30 PROCEDURE — 1111F DSCHRG MED/CURRENT MED MERGE: CPT | Mod: CPTII,S$GLB,, | Performed by: INTERNAL MEDICINE

## 2024-07-30 PROCEDURE — 3044F HG A1C LEVEL LT 7.0%: CPT | Mod: CPTII,S$GLB,, | Performed by: INTERNAL MEDICINE

## 2024-07-30 PROCEDURE — 3008F BODY MASS INDEX DOCD: CPT | Mod: CPTII,S$GLB,, | Performed by: INTERNAL MEDICINE

## 2024-07-30 PROCEDURE — 99999 PR PBB SHADOW E&M-EST. PATIENT-LVL IV: CPT | Mod: PBBFAC,,, | Performed by: INTERNAL MEDICINE

## 2024-07-30 PROCEDURE — 3079F DIAST BP 80-89 MM HG: CPT | Mod: CPTII,S$GLB,, | Performed by: INTERNAL MEDICINE

## 2024-07-30 PROCEDURE — 3074F SYST BP LT 130 MM HG: CPT | Mod: CPTII,S$GLB,, | Performed by: NURSE PRACTITIONER

## 2024-07-30 PROCEDURE — 1111F DSCHRG MED/CURRENT MED MERGE: CPT | Mod: CPTII,S$GLB,, | Performed by: NURSE PRACTITIONER

## 2024-07-30 PROCEDURE — 3044F HG A1C LEVEL LT 7.0%: CPT | Mod: CPTII,S$GLB,, | Performed by: NURSE PRACTITIONER

## 2024-07-30 PROCEDURE — 99215 OFFICE O/P EST HI 40 MIN: CPT | Mod: S$GLB,,, | Performed by: NURSE PRACTITIONER

## 2024-07-30 PROCEDURE — 3008F BODY MASS INDEX DOCD: CPT | Mod: CPTII,S$GLB,, | Performed by: NURSE PRACTITIONER

## 2024-07-30 PROCEDURE — 1159F MED LIST DOCD IN RCRD: CPT | Mod: CPTII,S$GLB,, | Performed by: INTERNAL MEDICINE

## 2024-07-30 RX ORDER — SERTRALINE HYDROCHLORIDE 25 MG/1
25 TABLET, FILM COATED ORAL DAILY
Qty: 30 TABLET | Refills: 1 | Status: SHIPPED | OUTPATIENT
Start: 2024-07-30

## 2024-07-30 NOTE — PROGRESS NOTES
Subjective:       Patient ID: Yovani Melvin is a 39 y.o. male.    Chief Complaint: Acute Pulmonary Embolism    HPI  39-year-old with a history of kidney stones and left ureteral stent went to the emergency room July 2024 diagnosed with PE any complaint of chest pain started  Eliquis first went to one hospital 2 days later went again 7/21 told he had pe /7/24 was told no pE  Nofamily hx grandfather ?? Clot, had kidney stent 7/5 tooth pulled end of may 2024 was in bed for a few days after stent  Review of Systems      Patient denies issues related to appetite or recent weight change.  Feels well overall.  Denies issues with generalized weakness .  Denies fatigue over above what is normally experienced with day-to-day activities  Denies fever, chills, rigors  Denies issues with ambulation  Denies generalized swelling or new lumps and bumps felt in any part  of body  Denies visual or hearing loss  Denies issues with congestion, sinus issues, cough, sputum production runny nose or itching eyes  Denies chest pain ++ palipitaions or passing out some chect pressure  Denies abdominal pain, reflux symptoms, nausea vomiting loose stools or constipation  Denies seizure activity or focal weaknesses or symptoms related to TIA, no head aches or blurred vision reported  Denies issues with skin rash or bruising  Denies issues with swelling of feet, tingling or numbness   No issues with sleep,   No recent foreign travel   Good family support reported       Past Medical History:   Diagnosis Date    Nephrolithiasis      Past Surgical History:   Procedure Laterality Date    CYSTOURETEROSCOPY, WITH HOLMIUM LASER LITHOTRIPSY OF URETERAL CALCULUS AND STENT INSERTION Left 7/5/2024    Procedure: CYSTOURETEROSCOPY, WITH HOLMIUM LASER LITHOTRIPSY OF URETERAL CALCULUS AND STENT INSERTION;  Surgeon: Milana Encinas MD;  Location: [facility];  Service: Urology;  Laterality: Left;    URETEROSCOPIC REMOVAL OF URETERIC CALCULUS N/A 7/5/2024     Procedure: REMOVAL, CALCULUS, URETER, URETEROSCOPIC;  Surgeon: Milana Encinas MD;  Location: [facility];  Service: Urology;  Laterality: N/A;     Family History   Problem Relation Name Age of Onset    Migraines Mother      Hypertension Mother      Diabetes Father      Diabetes Paternal Aunt      Sarcoidosis Paternal Aunt      Diabetes Paternal Grandfather      Emphysema Maternal Grandfather      COPD Maternal Grandfather        Social History     Socioeconomic History    Marital status:    Tobacco Use    Smoking status: Never    Smokeless tobacco: Never   Substance and Sexual Activity    Alcohol use: Never    Drug use: Never     Social Determinants of Health     Financial Resource Strain: Patient Declined (7/24/2024)    Overall Financial Resource Strain (CARDIA)     Difficulty of Paying Living Expenses: Patient declined   Recent Concern: Financial Resource Strain - High Risk (7/21/2024)    Overall Financial Resource Strain (CARDIA)     Difficulty of Paying Living Expenses: Hard   Food Insecurity: Patient Declined (7/24/2024)    Hunger Vital Sign     Worried About Running Out of Food in the Last Year: Patient declined     Ran Out of Food in the Last Year: Patient declined   Transportation Needs: No Transportation Needs (7/21/2024)    TRANSPORTATION NEEDS     Transportation : No   Physical Activity: Unknown (7/24/2024)    Exercise Vital Sign     Days of Exercise per Week: Patient declined     Minutes of Exercise per Session: 20 min   Recent Concern: Physical Activity - Insufficiently Active (7/21/2024)    Exercise Vital Sign     Days of Exercise per Week: 3 days     Minutes of Exercise per Session: 20 min   Stress: Stress Concern Present (7/24/2024)    [employer] [place] [employer] - Occupational Stress Questionnaire     Feeling of Stress : Very much   Housing Stability: Unknown (7/24/2024)    Housing Stability Vital Sign     Unable to Pay for Housing in the Last Year: Patient declined      Homeless in the Last Year: No   Recent Concern: Housing Stability - High Risk (7/21/2024)    Housing Stability Vital Sign     Unable to Pay for Housing in the Last Year: Yes     Homeless in the Last Year: No     Review of patient's allergies indicates:   Allergen Reactions    Aspirin Palpitations and Shortness Of Breath    Hydrocodone     Penicillins        Current Outpatient Medications:     apixaban (ELIQUIS) 5 mg Tab, Take 2 tablets (10 mg total) by mouth 2 (two) times daily for 7 days, THEN 1 tablet (5 mg total) 2 (two) times daily. Take 10mg bid x7 days, then 5mg bid., Disp: 30 tablet, Rfl: 2    cefdinir (OMNICEF) 300 MG capsule, Take 1 capsule (300 mg total) by mouth every 12 (twelve) hours. for 10 days, Disp: 20 capsule, Rfl: 0    ketorolac (TORADOL) 10 mg tablet, Take 10 mg by mouth every 6 (six) hours., Disp: , Rfl:     ondansetron (ZOFRAN-ODT) 4 MG TbDL, Take 1 tablet (4 mg total) by mouth every 8 (eight) hours as needed (nausea/vomiting)., Disp: 15 tablet, Rfl: 0    tamsulosin (FLOMAX) 0.4 mg Cap, Take 1 capsule (0.4 mg total) by mouth once daily., Disp: 30 capsule, Rfl: 0    traMADoL (ULTRAM) 50 mg tablet, Take 1 tablet (50 mg total) by mouth every 6 (six) hours as needed for Pain., Disp: 20 tablet, Rfl: 0    Physical Exam    Wt Readings from Last 3 Encounters:   07/30/24 72 kg (158 lb 11.7 oz)   07/23/24 74.3 kg (163 lb 11.2 oz)   07/22/24 76.7 kg (169 lb)     Temp Readings from Last 3 Encounters:   07/30/24 98.2 °F (36.8 °C) (Temporal)   07/23/24 98.5 °F (36.9 °C) (Oral)   07/22/24 98.6 °F (37 °C)     BP Readings from Last 3 Encounters:   07/30/24 133/88   07/23/24 124/84   07/22/24 132/83     Pulse Readings from Last 3 Encounters:   07/30/24 96   07/23/24 85   07/22/24 107    VITAL SIGNS:  as above   GENERAL: appears well-built, well-nourished.  No anxiety, no agitation, and in no distress.  Patient is awake, alert, oriented and cooperative.  HEENT:  Showed no congestion. Trachea is central no obvious  icterus or pallor noted no hoarseness. no obvious JVD   NECK:  Supple.  No JVD. No obvious cervical submental or supraclavicular adenopathy.  RS:the visualized portion of  Chest expands well. chest appears symmetric, no audible wheezes.  No dyspnea recognized  ABDOMEN:  abdomen appears undistended.  EXTREMITIES:  Without edema.  NEUROLOGICAL:  The patient is appropriate, higher functions are normal.  No  obvious neurological deficits.  normal judgement normal thought content  No confusion, no speech impediment. Cranial nerves are intact and show no deficit. No gross motor deficits noted   SKIN MUSCULOSKELETAL: no joint or skeletal deformity, no clubbing of nails.  No visible rash ecchymosis or petechiae     Lab Results   Component Value Date    WBC 6.30 07/22/2024    HGB 15.8 07/22/2024    HCT 47.6 07/22/2024    MCV 91 07/22/2024     07/22/2024       BMP  Lab Results   Component Value Date     07/23/2024    K 4.0 07/23/2024     07/23/2024    CO2 19 (L) 07/23/2024    BUN 14 07/23/2024    CREATININE 1.0 07/23/2024    CALCIUM 8.8 07/23/2024    ANIONGAP 13 07/23/2024    ESTGFRAFRICA >60 11/06/2019    EGFRNONAA >60 11/06/2019 July 21, 2024  Impression:     Probable small tertiary artery pulmonary emboli bilaterally.  Bilateral dependent atelectasis.  Small area of atelectasis in the left lingula.    July 23, 2024  Impression:     1.  Resolution of the tertiary pulmonary emboli in the lung bases.  No new pulmonary emboli.     2.  Atelectasis in lung bases and lingula.     Patient Active Problem List   Diagnosis    Ureteral stone    Acute cystitis with hematuria    Bilateral pulmonary embolism        Assessment and Plan     Possible PE diagnosed on CTA July 21, 2024 but showing resolution by July 23, 2024 ( he thinks he had a panic attack)  Repeat cta in 1 month - 2 months stay on eliquis till then    Palpitation:refer  to cardiology for w/u  Kidensy stones and occ hematuria> stent is out    Anxiety  attacks: sees psychaitry to help    MDM includes  :    - Acute or chronic illness or injury that poses a threat to life or bodily function  - Independent review and explanation of 3+ results from unique tests  - Discussion of management and ordering 3+ unique tests  - Extensive discussion of treatment and management  - Prescription drug management  - Drug therapy requiring intensive monitoring for toxicity

## 2024-07-30 NOTE — PROGRESS NOTES
SUBJECTIVE:      Patient ID: Yovani Melvin is a 39 y.o. male.    Chief Complaint: Follow-up    39-year-old male presents to the clinic for hospital discharge follow-up.     HPI: Hospital admission on 7/21  Mr. Melvin is a 39-year-old male with a past medical history of nephrolithiasis and is status post left ureteral stent placement on 07/05/2024 per Dr. Encinas.  He presented to the ED last night for complaints of left flank pain.  While in the ED, he was found to be tachycardic in his D-dimer came back elevated.  He had a chest CTA which was positive bilateral PE.  He was started on a heparin infusion.  He also had a CT of abdomen/pelvis with IV contrast which showed the left ureteral stent to be stable without residual hydronephrosis and a nonobstructing right renal stone.  He was also found to have a UTI and started on IV Rocephin.  A culture was collected. He reports having a recent episode at home where he felt like he was going to pass out.  He also gets a fluttering on the left side of his neck intermittently.  This started after he was prescribed BuSpar, which she has since stopped taking, however, this fluttering persist.  He also feels like he has trouble swallowing.  His spouse is at bedside endorses talking with Dr. Encinas today about getting the ureteral stent removed while he is here.  It was scheduled to come out on 07/30.  He reports the pain to be significant and has not taken anything other than Tylenol.  He has not tried the tramadol.  He was also been having urinary burning in his urine has been dark. He currently denies dizziness, shortness of breath, chest pain, nausea/vomiting. He is being admitted for further treatment and management of his condition.     * No surgery found *       Hospital Course:   Mr. Melvin was admitted for bilateral PE and UTI.  While here, his labs and vital signs were monitored and he was maintained on telemetry. He was initially placed on a heparin infusion, then  transitioned to full-dose Lovenox injections.  A venous ultrasound of BLE was done and was negative for DVT.  His echocardiogram was unremarkable.  He reports having an episode of pre syncope, which he thinks was due to a medication, and a carotid ultrasound was done and was negative for stenosis.  He had no neuro deficits or loss of consciousness and so head imaging was not obtained. A urine culture was collected and he was placed on empiric IV Rocephin. He had a recent left ureteral stent which was placed and causing him significant pain, for which he was treated with IV Toradol PRN. His urologist, Dr. Encinas, was consulted and evaluated him.  He had a CT of the abdomen/pelvis w& w/o contrast and was negative for contrast extravasation. He has not had any presyncope symptoms or sensation of palpitations since being here.  He reports having no overnight events and has been ambulating in the room without issues.  His vital signs remained stable and tachycardia improved.  He did urinate a blood clot and have some hematuria during the night, which is expected due to the ureteral stent, and has had no other issues with urinating.  He has been transitioned to Eliquis today and will continue a course of cefdinir for his UTI.  His urine culture was negative so far.  He is being discharged to home today in stable condition. He will follow up with Urology on 07/25 for ureteral stent removal.  He will be referred to Hematology, however, he states that he will be moving to [place] soon and we will most likely wait and follow up with a hematologist there.  He did not have any adverse events while here.       HPI for hospital admission on 7/26     The patient is a 39-year-old male with past medical history of nephrolithiasis s/p left ureteral stent who presented to the ED for the evaluation of chest pain.     The patient reports that he was discharged around 3:00 p.m. today from [facility].  He reports that he was  diagnosed of bilateral PE yesterday and was discharged on Eliquis.  He reports that he was given Lovenox shots twice during the hospital stay.  The patient reports that after going, he had right-sided chest pain which shifted to the center.  He also reports that he felt like he was going to pass out.  He denies any shortness of breath, nausea, vomiting.  He denies any calf pain. He denies any family history of blood clots.     * No surgery found *       Hospital Course:   Patient was admitted to the hospital medicine service.  His symptoms resolved.  Troponins were negative.  Echo was not repeated as it has been done recently and was normal.  CTA was repeated and cooperative and had decreased.  Patient was discharged home.  He will follow up with PCP and Urology.  He expressed understanding of discharge plan.  Return precautions discussed.     Patient had follow-up with Heme-Onc today. Plans is to repeat CTA chest in 1-2 months and remain on Eliquis. No hypercoag workup ordered. Reports compliance with Eliquis. He has tachycardia and SOB with activity. Recent echo was stable. He was referred to Cardiology by Heme-Onc for further workup. Continues to have intermittent hematuria. Urethra stent pulled last week. Continues to take Cefdinir. Denies issues with voiding.    He would like to see a psychiatrist for uncontrolled anxiety, which may be contributing to some of his symptoms. He is seeing a therapist, who also advised he see a psychiatrist. Previously failed Buspar due to increased anxiety/panic. He is interested in Lexapro or Buspar.        Family History   Problem Relation Name Age of Onset    Migraines Mother      Hypertension Mother      Diabetes Father      Diabetes Paternal Aunt      Sarcoidosis Paternal Aunt      Diabetes Paternal Grandfather      Emphysema Maternal Grandfather      COPD Maternal Grandfather        Social History     Socioeconomic History    Marital status:    Tobacco Use    Smoking  status: Never    Smokeless tobacco: Never   Substance and Sexual Activity    Alcohol use: Never    Drug use: Never     Social Determinants of Health     Financial Resource Strain: Patient Declined (7/24/2024)    Overall Financial Resource Strain (CARDIA)     Difficulty of Paying Living Expenses: Patient declined   Recent Concern: Financial Resource Strain - High Risk (7/21/2024)    Overall Financial Resource Strain (CARDIA)     Difficulty of Paying Living Expenses: Hard   Food Insecurity: Patient Declined (7/24/2024)    Hunger Vital Sign     Worried About Running Out of Food in the Last Year: Patient declined     Ran Out of Food in the Last Year: Patient declined   Transportation Needs: No Transportation Needs (7/21/2024)    TRANSPORTATION NEEDS     Transportation : No   Physical Activity: Unknown (7/24/2024)    Exercise Vital Sign     Days of Exercise per Week: Patient declined     Minutes of Exercise per Session: 20 min   Recent Concern: Physical Activity - Insufficiently Active (7/21/2024)    Exercise Vital Sign     Days of Exercise per Week: 3 days     Minutes of Exercise per Session: 20 min   Stress: Stress Concern Present (7/24/2024)    [place] [employer] - Occupational Stress Questionnaire     Feeling of Stress : Very much   Housing Stability: Unknown (7/24/2024)    Housing Stability Vital Sign     Unable to Pay for Housing in the Last Year: Patient declined     Homeless in the Last Year: No   Recent Concern: Housing Stability - High Risk (7/21/2024)    Housing Stability Vital Sign     Unable to Pay for Housing in the Last Year: Yes     Homeless in the Last Year: No     Current Outpatient Medications   Medication Sig Dispense Refill    apixaban (ELIQUIS) 5 mg Tab Take 2 tablets (10 mg total) by mouth 2 (two) times daily for 7 days, THEN 1 tablet (5 mg total) 2 (two) times daily. Take 10mg bid x7 days, then 5mg bid. 30 tablet 2    apixaban (ELIQUIS) 5 mg Tab Take 1 tablet (5 mg total) by  mouth 2 (two) times daily. 60 tablet 3    cefdinir (OMNICEF) 300 MG capsule Take 1 capsule (300 mg total) by mouth every 12 (twelve) hours. for 10 days 20 capsule 0    ketorolac (TORADOL) 10 mg tablet Take 10 mg by mouth every 6 (six) hours.      ondansetron (ZOFRAN-ODT) 4 MG TbDL Take 1 tablet (4 mg total) by mouth every 8 (eight) hours as needed (nausea/vomiting). 15 tablet 0    tamsulosin (FLOMAX) 0.4 mg Cap Take 1 capsule (0.4 mg total) by mouth once daily. 30 capsule 0    traMADoL (ULTRAM) 50 mg tablet Take 1 tablet (50 mg total) by mouth every 6 (six) hours as needed for Pain. 20 tablet 0    sertraline (ZOLOFT) 25 MG tablet Take 1 tablet (25 mg total) by mouth once daily. 30 tablet 1     No current facility-administered medications for this visit.     Review of patient's allergies indicates:   Allergen Reactions    Aspirin Palpitations and Shortness Of Breath    Hydrocodone     Penicillins       Past Medical History:   Diagnosis Date    Nephrolithiasis      Past Surgical History:   Procedure Laterality Date    CYSTOURETEROSCOPY, WITH HOLMIUM LASER LITHOTRIPSY OF URETERAL CALCULUS AND STENT INSERTION Left 7/5/2024    Procedure: CYSTOURETEROSCOPY, WITH HOLMIUM LASER LITHOTRIPSY OF URETERAL CALCULUS AND STENT INSERTION;  Surgeon: Milana Encinas MD;  Location: [facility];  Service: Urology;  Laterality: Left;    URETEROSCOPIC REMOVAL OF URETERIC CALCULUS N/A 7/5/2024    Procedure: REMOVAL, CALCULUS, URETER, URETEROSCOPIC;  Surgeon: Milana Encinas MD;  Location: [facility];  Service: Urology;  Laterality: N/A;       Review of Systems   Constitutional:  Negative for activity change, appetite change, chills, diaphoresis, fatigue, fever and unexpected weight change.   HENT:  Negative for congestion, ear pain, sinus pressure, sore throat, trouble swallowing and voice change.    Eyes:  Negative for pain, discharge and visual disturbance.   Respiratory:  Positive for shortness of breath (with activity). Negative  "for cough, chest tightness and wheezing.    Cardiovascular:  Negative for chest pain and palpitations.        Tachycardia   Gastrointestinal:  Negative for abdominal pain, constipation, diarrhea, nausea and vomiting.   Genitourinary:  Negative for difficulty urinating, flank pain, frequency and urgency.   Musculoskeletal:  Negative for back pain and joint swelling.   Skin:  Negative for color change and rash.   Neurological:  Negative for dizziness, seizures, syncope, weakness, numbness and headaches.   Hematological:  Negative for adenopathy.   Psychiatric/Behavioral:  Negative for dysphoric mood and sleep disturbance. The patient is not nervous/anxious.       OBJECTIVE:      Vitals:    07/30/24 1257   BP: 120/84   BP Location: Left arm   Patient Position: Sitting   BP Method: Medium (Manual)   Pulse: 108   SpO2: 97%   Weight: 71.2 kg (157 lb)   Height: 5' 8" (1.727 m)     Physical Exam  Vitals and nursing note reviewed.   Constitutional:       General: He is awake. He is not in acute distress.     Appearance: Normal appearance. He is well-developed, well-groomed and normal weight. He is not ill-appearing, toxic-appearing or diaphoretic.   HENT:      Head: Normocephalic and atraumatic.      Right Ear: Tympanic membrane, ear canal and external ear normal.      Left Ear: Tympanic membrane, ear canal and external ear normal.      Nose: Nose normal.   Eyes:      General: Lids are normal. Gaze aligned appropriately.      Conjunctiva/sclera: Conjunctivae normal.      Right eye: Right conjunctiva is not injected.      Left eye: Left conjunctiva is not injected.      Pupils: Pupils are equal, round, and reactive to light.   Cardiovascular:      Rate and Rhythm: Regular rhythm. Tachycardia present.      Pulses: Normal pulses.      Heart sounds: Normal heart sounds, S1 normal and S2 normal. No murmur heard.  Pulmonary:      Effort: Pulmonary effort is normal. No respiratory distress.      Breath sounds: Normal breath sounds. " No stridor. No decreased breath sounds, wheezing, rhonchi or rales.   Chest:      Chest wall: No tenderness.   Musculoskeletal:      Cervical back: Neck supple.      Right lower leg: No edema.      Left lower leg: No edema.   Lymphadenopathy:      Cervical: No cervical adenopathy.   Skin:     General: Skin is warm and dry.      Capillary Refill: Capillary refill takes less than 2 seconds.      Findings: No erythema or rash.   Neurological:      Mental Status: He is alert and oriented to person, place, and time. Mental status is at baseline.   Psychiatric:         Attention and Perception: Attention normal.         Mood and Affect: Mood normal.         Speech: Speech normal.         Behavior: Behavior normal. Behavior is cooperative.         Thought Content: Thought content normal.         Judgment: Judgment normal.        No visits with results within 1 Week(s) from this visit.   Latest known visit with results is:   Admission on 07/22/2024, Discharged on 07/23/2024   Component Date Value Ref Range Status    QRS Duration 07/22/2024 86  ms Final    [facility]HS QTC Calculation 07/22/2024 414  ms Final    WBC 07/22/2024 6.30  3.90 - 12.70 K/uL Final    RBC 07/22/2024 5.24  4.60 - 6.20 M/uL Final    Hemoglobin 07/22/2024 15.8  14.0 - 18.0 g/dL Final    Hematocrit 07/22/2024 47.6  40.0 - 54.0 % Final    MCV 07/22/2024 91  82 - 98 fL Final    MCH 07/22/2024 30.2  27.0 - 31.0 pg Final    MCHC 07/22/2024 33.2  32.0 - 36.0 g/dL Final    RDW 07/22/2024 12.0  11.5 - 14.5 % Final    Platelets 07/22/2024 287  150 - 450 K/uL Final    MPV 07/22/2024 9.1 (L)  9.2 - 12.9 fL Final    Immature Granulocytes 07/22/2024 0.3  0.0 - 0.5 % Final    Gran # (ANC) 07/22/2024 4.2  1.8 - 7.7 K/uL Final    Immature Grans (Abs) 07/22/2024 0.02  0.00 - 0.04 K/uL Final    Comment: Mild elevation in immature granulocytes is non specific and   can be seen in a variety of conditions including stress response,   acute inflammation, trauma and pregnancy.  Correlation with other   laboratory and clinical findings is essential.      Lymph # 07/22/2024 1.4  1.0 - 4.8 K/uL Final    Mono # 07/22/2024 0.5  0.3 - 1.0 K/uL Final    Eos # 07/22/2024 0.2  0.0 - 0.5 K/uL Final    Baso # 07/22/2024 0.06  0.00 - 0.20 K/uL Final    nRBC 07/22/2024 0  0 /100 WBC Final    Gran % 07/22/2024 66.9  38.0 - 73.0 % Final    Lymph % 07/22/2024 21.4  18.0 - 48.0 % Final    Mono % 07/22/2024 7.5  4.0 - 15.0 % Final    Eosinophil % 07/22/2024 2.9  0.0 - 8.0 % Final    Basophil % 07/22/2024 1.0  0.0 - 1.9 % Final    Differential Method 07/22/2024 Automated   Final    Sodium 07/22/2024 137  136 - 145 mmol/L Final    Potassium 07/22/2024 4.3  3.5 - 5.1 mmol/L Final    Chloride 07/22/2024 103  95 - 110 mmol/L Final    CO2 07/22/2024 24  23 - 29 mmol/L Final    Glucose 07/22/2024 111 (H)  70 - 110 mg/dL Final    BUN 07/22/2024 15  6 - 20 mg/dL Final    Creatinine 07/22/2024 1.2  0.5 - 1.4 mg/dL Final    Calcium 07/22/2024 9.5  8.7 - 10.5 mg/dL Final    Total Protein 07/22/2024 7.8  6.0 - 8.4 g/dL Final    Albumin 07/22/2024 4.6  3.5 - 5.2 g/dL Final    Total Bilirubin 07/22/2024 0.4  0.1 - 1.0 mg/dL Final    Comment: For infants and newborns, interpretation of results should be based  on gestational age, weight and in agreement with clinical  observations.    Premature Infant recommended reference ranges:  Up to 24 hours.............<8.0 mg/dL  Up to 48 hours............<12.0 mg/dL  3-5 days..................<15.0 mg/dL  6-29 days.................<15.0 mg/dL      Alkaline Phosphatase 07/22/2024 62  55 - 135 U/L Final    AST 07/22/2024 15  10 - 40 U/L Final    ALT 07/22/2024 17  10 - 44 U/L Final    eGFR 07/22/2024 >60.0  >60 mL/min/1.73 m^2 Final    Anion Gap 07/22/2024 10  8 - 16 mmol/L Final    BNP 07/22/2024 3  0 - 99 pg/mL Final    Values of less than 100 pg/ml are consistent with non-CHF populations.    Prothrombin Time 07/22/2024 11.1  9.0 - 12.5 sec Final    INR 07/22/2024 1.0  0.8 - 1.2  Final    Comment: Coumadin Therapy:  2.0 - 3.0 for INR for all indicators except mechanical heart valves  and antiphospholipid syndromes which should use 2.5 - 3.5.      aPTT 07/22/2024 31.5  21.0 - 32.0 sec Final    Comment: Refer to local heparin nomogram for intensity/dose specific   therapeutic   range.      Troponin I High Sensitivity 07/22/2024 <2.3  0.0 - 14.9 pg/mL Final    Comment: Troponin results differ between methods. Do not use   results between Troponin methods interchangeably.    Alkaline Phospatase levels above 400 U/L may   cause false positive results.    Access hsTnI should not be used for patients taking   Asfotase dejah (Strensiq).      Troponin I High Sensitivity 07/23/2024 <2.3  0.0 - 14.9 pg/mL Final    Comment: Troponin results differ between methods. Do not use   results between Troponin methods interchangeably.    Alkaline Phospatase levels above 400 U/L may   cause false positive results.    Access hsTnI should not be used for patients taking   Asfotase dejah (Strensiq).      Troponin I High Sensitivity 07/23/2024 <2.3  0.0 - 14.9 pg/mL Final    Comment: Troponin results differ between methods. Do not use   results between Troponin methods interchangeably.    Alkaline Phospatase levels above 400 U/L may   cause false positive results.    Access hsTnI should not be used for patients taking   Asfotase dejah (Strensiq).      Sodium 07/23/2024 137  136 - 145 mmol/L Final    Potassium 07/23/2024 4.0  3.5 - 5.1 mmol/L Final    Chloride 07/23/2024 105  95 - 110 mmol/L Final    CO2 07/23/2024 19 (L)  23 - 29 mmol/L Final    Glucose 07/23/2024 108  70 - 110 mg/dL Final    BUN 07/23/2024 14  6 - 20 mg/dL Final    Creatinine 07/23/2024 1.0  0.5 - 1.4 mg/dL Final    Calcium 07/23/2024 8.8  8.7 - 10.5 mg/dL Final    Total Protein 07/23/2024 6.9  6.0 - 8.4 g/dL Final    Albumin 07/23/2024 4.2  3.5 - 5.2 g/dL Final    Total Bilirubin 07/23/2024 0.5  0.1 - 1.0 mg/dL Final    Comment: For infants and  newborns, interpretation of results should be based  on gestational age, weight and in agreement with clinical  observations.    Premature Infant recommended reference ranges:  Up to 24 hours.............<8.0 mg/dL  Up to 48 hours............<12.0 mg/dL  3-5 days..................<15.0 mg/dL  6-29 days.................<15.0 mg/dL      Alkaline Phosphatase 07/23/2024 53 (L)  55 - 135 U/L Final    AST 07/23/2024 15  10 - 40 U/L Final    ALT 07/23/2024 15  10 - 44 U/L Final    eGFR 07/23/2024 >60.0  >60 mL/min/1.73 m^2 Final    Anion Gap 07/23/2024 13  8 - 16 mmol/L Final   '  CTA Chest Non-Coronary (PE Studies)  Order: [id number]  Status: Final result       Visible to patient: Yes (seen)       Next appt: 09/10/2024 at 10:00 AM in Radiology ([facility] CT1 AQUILION PRIME 80 LIMIT 450 LBS)    0 Result Notes  Details    Reading Physician Reading Date Result Priority   Isidro Gee Jr., MD  [phone] 7/21/2024 STAT     Narrative & Impression  EXAMINATION:  CTA CHEST NON CORONARY (PE STUDIES)     CLINICAL HISTORY:  Pulmonary embolism (PE) suspected, positive D-dimer;     TECHNIQUE:  Low dose axial images, sagittal and coronal reformations were obtained from the thoracic inlet to the lung bases following the IV administration of 100 mL of Omnipaque 350.  Contrast timing was optimized to evaluate the pulmonary arteries.  MIP images were performed.     COMPARISON:  CTA chest of November 6, 2019.     FINDINGS:  There are small vessels in both lower lobes showing probable tertiary pulmonary emboli.  Larger emboli in the main pulmonary arteries are not seen however.  The heart and great vessels are of normal size and contour.  Dissection or aneurysm is not seen.     There is a small area of atelectasis in the left lingula.  Other intrapulmonary masses are not seen.  There is mild bilateral dependent atelectasis.  No pneumothorax or pleural effusion is noted.     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  Bilateral dependent atelectasis.  Small area of atelectasis in the left lingula.     US Lower Extremity Veins Bilateral  Order: [id number]  Status: Final result       Visible to patient: Yes (seen)       Next appt: 09/10/2024 at 10:00 AM in Radiology ([facility] CT1 AQUILION PRIME 80 LIMIT 450 LBS)    0 Result Notes  Details    Reading Physician Reading Date Result Priority   Isidro Gee Jr., MD  [phone] 7/21/2024 STAT     Narrative & Impression  EXAMINATION:  US LOWER EXTREMITY VEINS BILATERAL     CLINICAL HISTORY:  PE;     TECHNIQUE:  Duplex and color flow Doppler and dynamic compression was performed of the bilateral lower extremity veins was performed.     COMPARISON:  None     FINDINGS:  Right thigh veins: The common femoral, femoral, popliteal, upper greater saphenous, and deep femoral veins are patent and free of thrombus. The veins are normally compressible and have normal phasic flow and augmentation response.     Right calf veins: The visualized calf veins are patent.     Left thigh veins: The common femoral, femoral, popliteal, upper greater saphenous, and deep femoral veins are patent and free of thrombus. The veins are normally compressible and have normal phasic flow and augmentation response.     Left calf veins: The visualized calf veins are patent.     Miscellaneous: None     Impression:     No evidence of deep venous thrombosis in either lower extremity.     US Carotid Bilateral  Order: [id number]  Status: Final result       Visible to patient: Yes (seen)       Next appt: 09/10/2024 at 10:00 AM in Radiology ([facility] CT1 AQUILION PRIME 80 LIMIT 450 LBS)    0 Result Notes  Details    Reading Physician Reading Date Result Priority   Isidro Gee Jr., MD  [phone] 7/21/2024 Routine     Narrative & Impression  EXAMINATION:  US CAROTID BILATERAL     CLINICAL HISTORY:  pre-syncope;     TECHNIQUE:  Grayscale and color Doppler ultrasound examination of the carotid and vertebral  artery systems bilaterally.  Stenosis estimates are per the NASCET measurement criteria.     COMPARISON:  None.     FINDINGS:  Right:     Internal Carotid Artery (ICA) peak systolic velocity 62 cm/sec     ICA/CCA peak systolic velocity ratio: 0.6     Plaque formation: Not seen     Vertebral artery: Antegrade flow and normal waveform.     Left:     Internal Carotid Artery (ICA)  peak systolic velocity 51 cm/sec     ICA/CCA peak systolic velocity ratio: 0.6     Plaque formation: Not seen     Vertebral artery: Antegrade flow and normal waveform.     Impression:     No evidence of a hemodynamically significant carotid bifurcation stenosis.        CT Abdomen Pelvis With IV Contrast NO Oral Contrast  Order: [id number]  Status: Final result       Visible to patient: Yes (seen)       Next appt: 09/10/2024 at 10:00 AM in Radiology ([facility] CT1 AQUILION PRIME 80 LIMIT 450 LBS)    0 Result Notes  Details    Reading Physician Reading Date Result Priority   Michael Mckeon MD  [phone]  [phone] 7/22/2024 Routine     Narrative & Impression  EXAMINATION:  CT ABDOMEN PELVIS WITH IV CONTRAST     CLINICAL HISTORY:  eval drainage of left kidney for extrav;     TECHNIQUE:  Low dose axial images, sagittal and coronal reformations were obtained from the lung bases to the pubic symphysis following the IV administration of 75 mL of Omnipaque 350 .  Oral contrast was not given.     Note that imaging was attained 3 minutes after contrast injection per request of the ordering physician.     COMPARISON:  07/21/2024     FINDINGS:  There is atelectasis in both lung bases and trace right pleural fluid.  Normal size heart.  There is some hyperdense material in the gallbladder lumen presumably vicarious excretion of contrast or less likely sludge.     There is a left double-J ureteral stent with left hydroureteronephrosis.  There is some early excretion of contrast in the renal collecting systems and also contrast proceeding down the  right ureter and partially filling the bladder.  Minimal if any contrast is present in the left ureter.  No extraluminal extravasation of contrast.  Spleen is 14 cm in length.  Remaining solid abdominal organs are unremarkable.     Insert opacified no dilated bowel loops.  Normal appendix.     Normal size prostate.  No urinary bladder wall thickening.  A no focal abdominal fluid collection.  Small fat containing umbilical defect.     There is a left unilateral L5 pars defect.     Impression:     1. Appropriately positioned left ureteral stent with mild residual left hydronephrosis.  No evidence for contrast extravasation.  2. Mild splenomegaly.     CTA Chest Non-Coronary (PE Studies)  Order: [id number]  Status: Final result       Visible to patient: Yes (seen)       Next appt: 09/10/2024 at 10:00 AM in Radiology ([facility] CT1 AQUILION PRIME 80 LIMIT 450 LBS)    0 Result Notes  Details    Reading Physician Reading Date Result Priority   Connie Sky MD  [phone] 7/23/2024 Pending Discharge     Narrative & Impression     CMS MANDATED QUALITY DATA - CT RADIATION - 436     All CT scans at this facility utilize dose modulation, iterative reconstruction, and/or weight based dosing when appropriate to reduce radiation dose to as low as reasonably achievable.     CLINICAL HISTORY:  ([id number])40 y/o  (1985) M     Pulmonary embolism (PE) suspected, positive D-dimer;known PE, worsening symptoms;     TECHNIQUE:  (A#[id number], exam time 7/23/2024 14:35)     CTA CHEST NON CORONARY (PE STUDIES) [id number]     Axial CT examination of the chest with attention to the pulmonary arteries was performed using contiguous axial images from the diaphragms to the lung apices following the intravenous administration of 100 cc Omnipaque 350 non-ionic contrast material. Images were reviewed using lung, mediastinal, and bone windows. The study was performed with thin sections with subsequent 3-D reformats/MIP/reconstructions in  multiple planes.     COMPARISON:  07/21/2024     FINDINGS:  CTA PE evaluation: This is a high quality study for the evaluation of pulmonary embolism . The pulmonary arteries are normal in appearance without pulmonary emboli noted up to the segmental level, noting the limitations of CT technique for identifying small or isolated subsegmental emboli.  The previously noted probable small tertiary artery pulmonary emboli in the lung bases is no longer identified.     CT Chest:     Visualized neck: normal     Lungs: Atelectasis in lung bases and lingula.  There are no confluent infiltrates or pulmonary nodules.     Airway: The trachea and central bronchial tree appear normal.     Pleura: There is no pleural effusion. There is no pneumothorax.     Cardiovascular: Heart is normal in size.  There is no pericardial effusion.  The aorta is normal in caliber.     Mediastinum: No adenopathy.     Soft tissues: The peripheral soft tissues appear normal.     Musculoskeletal: normal     Esophagus: normal     Upper Abdomen: visualized     Impression:     1.  Resolution of the tertiary pulmonary emboli in the lung bases.  No new pulmonary emboli.     2.  Atelectasis in lung bases and lingula.     Assessment:       1. Hospital discharge follow-up    2. Generalized anxiety disorder    3. Bilateral pulmonary embolism    4. Tachycardia    5. Hematuria, unspecified type        Plan:       Hospital discharge follow-up  Patients hospital course was reviewed. Meds were discussed. Follow-up visits discussed. Referrals placed. Continue current meds.     Generalized anxiety disorder  Trial of Zoloft. Low dose started due to fear of side effects. Referral placed to Psychiatry. Continue to see therapist.   -     Ambulatory referral/consult to Psychiatry; Future; Expected date: 08/06/2024  -     sertraline (ZOLOFT) 25 MG tablet; Take 1 tablet (25 mg total) by mouth once daily.  Dispense: 30 tablet; Refill: 1    Bilateral pulmonary embolism  Unsure  if patient initially had bilateral PE. PE resolved 2 days later after starting lovenox and Eliquis. Patient has seen Hematology. Plans to have repeat CTA chest in 1-2 months. Patient to continue Eliquis.    Tachycardia  Suspect his tachycardia could be anxiety related. However, he does state his heart rate increased to 170 at times. Holter monitor ordered to check underlying rhythm. Patient referred to Cardiology, by Heme-Onc. Will treat patients anxiety with Zoloft and see if tachycardia improves. Recent echo was stable.   -     Cardiac Monitor - 3-15 Day Adult (Cupid Only); Future    Hematuria, unspecified type  Stent recently removed, also on Eliquis. He is still able to urinate without difficulty. Hematuria is intermittent. Continue antibiotics. Follow-up with Urology.    This note was created using [employer] voice recognition software that occasionally misinterprets phrases or words.     I spent a total of 42 minutes on the day of the visit.This includes face to face time and non-face to face time preparing to see the patient (eg, review of tests), obtaining and/or reviewing separately obtained history, documenting clinical information in the electronic or other health record, independently interpreting results and communicating results to the patient/family/caregiver, or care coordinator.    Follow up in about 1 month (around 8/30/2024) for Anxiety.          7/30/2024 JASON Rodas, RYAN

## 2024-07-31 ENCOUNTER — PATIENT MESSAGE (OUTPATIENT)
Dept: UROLOGY | Facility: CLINIC | Age: 39
End: 2024-07-31
Payer: COMMERCIAL

## 2024-07-31 ENCOUNTER — TELEPHONE (OUTPATIENT)
Dept: FAMILY MEDICINE | Facility: CLINIC | Age: 39
End: 2024-07-31
Payer: COMMERCIAL

## 2024-07-31 ENCOUNTER — TELEPHONE (OUTPATIENT)
Dept: PSYCHIATRY | Facility: CLINIC | Age: 39
End: 2024-07-31
Payer: COMMERCIAL

## 2024-07-31 NOTE — TELEPHONE ENCOUNTER
Returned patient call and advised of the wait list for med management. Patient would like to be placed on the wait list. Offered resources, patient accepted, sent via my chart

## 2024-08-02 ENCOUNTER — HOSPITAL ENCOUNTER (OUTPATIENT)
Dept: CARDIOLOGY | Facility: CLINIC | Age: 39
Discharge: HOME | End: 2024-08-02
Attending: NURSE PRACTITIONER
Payer: COMMERCIAL

## 2024-08-02 DIAGNOSIS — R00.0 TACHYCARDIA: ICD-10-CM

## 2024-08-02 PROCEDURE — 93244 EXT ECG>48HR<7D REV&INTERPJ: CPT | Mod: ,,, | Performed by: GENERAL PRACTICE

## 2024-08-02 PROCEDURE — 93242 EXT ECG>48HR<7D RECORDING: CPT | Mod: ,,, | Performed by: GENERAL PRACTICE

## 2024-08-07 ENCOUNTER — HOSPITAL ENCOUNTER (OUTPATIENT)
Dept: RADIOLOGY | Facility: HOSPITAL | Age: 39
Discharge: HOME | End: 2024-08-07
Attending: STUDENT IN AN ORGANIZED HEALTH CARE EDUCATION/TRAINING PROGRAM
Payer: COMMERCIAL

## 2024-08-07 DIAGNOSIS — N20.1 URETERAL STONE: ICD-10-CM

## 2024-08-07 PROCEDURE — 76770 US EXAM ABDO BACK WALL COMP: CPT | Mod: 26,,, | Performed by: RADIOLOGY

## 2024-08-07 PROCEDURE — 76770 US EXAM ABDO BACK WALL COMP: CPT | Mod: TC,PO

## 2024-08-08 ENCOUNTER — PATIENT MESSAGE (OUTPATIENT)
Dept: FAMILY MEDICINE | Facility: CLINIC | Age: 39
End: 2024-08-08
Payer: COMMERCIAL

## 2024-08-08 ENCOUNTER — PATIENT MESSAGE (OUTPATIENT)
Dept: UROLOGY | Facility: CLINIC | Age: 39
End: 2024-08-08
Payer: COMMERCIAL

## 2024-08-08 ENCOUNTER — TELEPHONE (OUTPATIENT)
Dept: FAMILY MEDICINE | Facility: CLINIC | Age: 39
End: 2024-08-08
Payer: COMMERCIAL

## 2024-08-08 DIAGNOSIS — N22 CALCULUS OF URINARY TRACT IN DISEASES CLASSIFIED ELSEWHERE: Primary | ICD-10-CM

## 2024-08-08 LAB
OHS CV EVENT MONITOR DAY: 2
OHS CV HOLTER HOOKUP DATE: NORMAL
OHS CV HOLTER HOOKUP TIME: NORMAL
OHS CV HOLTER LENGTH DECIMAL HOURS: 71
OHS CV HOLTER LENGTH HOURS: 23
OHS CV HOLTER LENGTH MINUTES: 0
OHS CV HOLTER SCAN DATE: NORMAL
OHS CV HOLTER SINUS AVERAGE HR: 72 BPM
OHS CV HOLTER SINUS MAX HR: 169 BPM
OHS CV HOLTER SINUS MIN HR: 42 BPM
OHS CV HOLTER STUDY END DATE: NORMAL
OHS CV HOLTER STUDY END TIME: NORMAL

## 2024-08-09 ENCOUNTER — HOSPITAL ENCOUNTER (OUTPATIENT)
Dept: RADIOLOGY | Facility: HOSPITAL | Age: 39
Discharge: HOME | End: 2024-08-09
Attending: STUDENT IN AN ORGANIZED HEALTH CARE EDUCATION/TRAINING PROGRAM
Payer: COMMERCIAL

## 2024-08-09 DIAGNOSIS — N22 CALCULUS OF URINARY TRACT IN DISEASES CLASSIFIED ELSEWHERE: ICD-10-CM

## 2024-08-09 PROCEDURE — 74178 CT ABD&PLV WO CNTR FLWD CNTR: CPT | Mod: 26,,, | Performed by: RADIOLOGY

## 2024-08-09 PROCEDURE — 74178 CT ABD&PLV WO CNTR FLWD CNTR: CPT | Mod: TC

## 2024-08-09 PROCEDURE — 25500020 PHARM REV CODE 255

## 2024-08-09 RX ADMIN — IOHEXOL 125 ML: 350 INJECTION, SOLUTION INTRAVENOUS at 03:08

## 2024-08-12 ENCOUNTER — LAB VISIT (OUTPATIENT)
Dept: LAB | Facility: HOSPITAL | Age: 39
End: 2024-08-12
Attending: FAMILY MEDICINE
Payer: COMMERCIAL

## 2024-08-12 ENCOUNTER — OFFICE VISIT (OUTPATIENT)
Dept: FAMILY MEDICINE | Facility: CLINIC | Age: 39
End: 2024-08-12
Payer: COMMERCIAL

## 2024-08-12 VITALS
RESPIRATION RATE: 18 BRPM | HEART RATE: 88 BPM | SYSTOLIC BLOOD PRESSURE: 100 MMHG | WEIGHT: 156 LBS | OXYGEN SATURATION: 98 % | BODY MASS INDEX: 23.64 KG/M2 | TEMPERATURE: 99 F | DIASTOLIC BLOOD PRESSURE: 82 MMHG | HEIGHT: 68 IN

## 2024-08-12 DIAGNOSIS — R31.0 GROSS HEMATURIA: ICD-10-CM

## 2024-08-12 DIAGNOSIS — R10.32 LEFT LOWER QUADRANT ABDOMINAL PAIN: Primary | ICD-10-CM

## 2024-08-12 LAB
BASOPHILS # BLD AUTO: 0.05 K/UL (ref 0–0.2)
BASOPHILS NFR BLD: 1 % (ref 0–1.9)
BILIRUB UR QL STRIP: NEGATIVE
DIFFERENTIAL METHOD BLD: ABNORMAL
EOSINOPHIL # BLD AUTO: 0.1 K/UL (ref 0–0.5)
EOSINOPHIL NFR BLD: 1.4 % (ref 0–8)
ERYTHROCYTE [DISTWIDTH] IN BLOOD BY AUTOMATED COUNT: 12.4 % (ref 11.5–14.5)
GLUCOSE UR QL STRIP: NEGATIVE
HCT VFR BLD AUTO: 49 % (ref 40–54)
HGB BLD-MCNC: 15.4 G/DL (ref 14–18)
IMM GRANULOCYTES # BLD AUTO: 0.01 K/UL (ref 0–0.04)
IMM GRANULOCYTES NFR BLD AUTO: 0.2 % (ref 0–0.5)
KETONES UR QL STRIP: NEGATIVE
LEUKOCYTE ESTERASE UR QL STRIP: NEGATIVE
LYMPHOCYTES # BLD AUTO: 1 K/UL (ref 1–4.8)
LYMPHOCYTES NFR BLD: 19.5 % (ref 18–48)
MCH RBC QN AUTO: 29.4 PG (ref 27–31)
MCHC RBC AUTO-ENTMCNC: 31.4 G/DL (ref 32–36)
MCV RBC AUTO: 94 FL (ref 82–98)
MONOCYTES # BLD AUTO: 0.5 K/UL (ref 0.3–1)
MONOCYTES NFR BLD: 9.1 % (ref 4–15)
NEUTROPHILS # BLD AUTO: 3.4 K/UL (ref 1.8–7.7)
NEUTROPHILS NFR BLD: 68.8 % (ref 38–73)
NRBC BLD-RTO: 0 /100 WBC
PH, POC UA: 5 (ref 5–8)
PLATELET # BLD AUTO: 222 K/UL (ref 150–450)
PMV BLD AUTO: 10 FL (ref 9.2–12.9)
POC BLOOD, URINE: POSITIVE
POC NITRATES, URINE: NEGATIVE
PROT UR QL STRIP: POSITIVE
RBC # BLD AUTO: 5.24 M/UL (ref 4.6–6.2)
SP GR UR STRIP: 1.02 (ref 1–1.03)
UROBILINOGEN UR STRIP-ACNC: ABNORMAL (ref 0.3–2.2)
WBC # BLD AUTO: 4.92 K/UL (ref 3.9–12.7)

## 2024-08-12 PROCEDURE — 3044F HG A1C LEVEL LT 7.0%: CPT | Mod: CPTII,S$GLB,, | Performed by: FAMILY MEDICINE

## 2024-08-12 PROCEDURE — 99213 OFFICE O/P EST LOW 20 MIN: CPT | Mod: S$GLB,,, | Performed by: FAMILY MEDICINE

## 2024-08-12 PROCEDURE — 3079F DIAST BP 80-89 MM HG: CPT | Mod: CPTII,S$GLB,, | Performed by: FAMILY MEDICINE

## 2024-08-12 PROCEDURE — 81003 URINALYSIS AUTO W/O SCOPE: CPT | Mod: QW,S$GLB,, | Performed by: FAMILY MEDICINE

## 2024-08-12 PROCEDURE — 1159F MED LIST DOCD IN RCRD: CPT | Mod: CPTII,S$GLB,, | Performed by: FAMILY MEDICINE

## 2024-08-12 PROCEDURE — 36415 COLL VENOUS BLD VENIPUNCTURE: CPT | Performed by: FAMILY MEDICINE

## 2024-08-12 PROCEDURE — 85025 COMPLETE CBC W/AUTO DIFF WBC: CPT | Performed by: FAMILY MEDICINE

## 2024-08-12 PROCEDURE — 99999 PR PBB SHADOW E&M-EST. PATIENT-LVL IV: CPT | Mod: PBBFAC,,, | Performed by: FAMILY MEDICINE

## 2024-08-12 PROCEDURE — 3008F BODY MASS INDEX DOCD: CPT | Mod: CPTII,S$GLB,, | Performed by: FAMILY MEDICINE

## 2024-08-12 PROCEDURE — 3074F SYST BP LT 130 MM HG: CPT | Mod: CPTII,S$GLB,, | Performed by: FAMILY MEDICINE

## 2024-08-12 NOTE — PROGRESS NOTES
Subjective:       Patient ID: Yovani Melvin is a 39 y.o. male.    Chief Complaint: ct results and Abdominal Pain      Patient is here for follow-up on kidney stone had complicated 5 mm stone requiring a stent catheter.  He is still having some blood in the urine but is clearing.  Patient Active Problem List:     Ureteral stone     Acute cystitis with hematuria     Bilateral pulmonary embolism.  This was in July 21st and has been on Eliquis 5 mg twice a day since then.  Patient does have appointment with the hematologist in September.  Lab Results       Component                Value               Date                       WBC                      6.30                07/22/2024                 HGB                      15.8                07/22/2024                 HCT                      47.6                07/22/2024                 PLT                      287                 07/22/2024                 CHOL                     228 (H)             05/16/2024                 TRIG                     152 (H)             05/16/2024                 HDL                      35 (L)              05/16/2024                 ALT                      15                  07/23/2024                 AST                      15                  07/23/2024                 NA                       137                 07/23/2024                 K                        4.0                 07/23/2024                 CL                       105                 07/23/2024                 CREATININE               1.0                 07/23/2024                 BUN                      14                  07/23/2024                 CO2                      19 (L)              07/23/2024                 TSH                      2.71                05/16/2024                 INR                      1.0                 07/22/2024                 HGBA1C                   5.6                 05/16/2024            Patient has had several weeks  of hematuria and has not had a follow-up CBC.          Abdominal Pain  This is a new problem. The current episode started in the past 7 days. The onset quality is gradual. The problem occurs constantly. The most recent episode lasted 0 days. The problem has been gradually improving. The pain is located in the LLQ and left flank. The pain is at a severity of 8/10. The pain is moderate. The quality of the pain is aching, dull, sharp and colicky. The abdominal pain radiates to the LLQ, left flank, pelvis and perineum. Associated symptoms include belching, hematuria and weight loss. Pertinent negatives include no anorexia, arthralgias, constipation, diarrhea, dysuria, fever, flatus, frequency, headaches, hematochezia, melena, myalgias, nausea or vomiting. The pain is aggravated by being still, certain positions and movement. The pain is relieved by Standing. He has tried acetaminophen for the symptoms. The treatment provided mild relief. Prior diagnostic workup includes CT scan and ultrasound. His past medical history is significant for GERD. There is no history of abdominal surgery, colon cancer, Crohn's disease, gallstones, irritable bowel syndrome, pancreatitis, PUD or ulcerative colitis. Patient's medical history includes kidney stones. Patient's medical history does not include UTI.       Allergies and Medications:   Review of patient's allergies indicates:   Allergen Reactions    Aspirin Palpitations and Shortness Of Breath    Hydrocodone     Penicillins      Current Outpatient Medications   Medication Sig Dispense Refill    apixaban (ELIQUIS) 5 mg Tab Take 2 tablets (10 mg total) by mouth 2 (two) times daily for 7 days, THEN 1 tablet (5 mg total) 2 (two) times daily. Take 10mg bid x7 days, then 5mg bid. 30 tablet 2    apixaban (ELIQUIS) 5 mg Tab Take 1 tablet (5 mg total) by mouth 2 (two) times daily. 60 tablet 3    ketorolac (TORADOL) 10 mg tablet Take 10 mg by mouth every 6 (six) hours.      ondansetron  (ZOFRAN-ODT) 4 MG TbDL Take 1 tablet (4 mg total) by mouth every 8 (eight) hours as needed (nausea/vomiting). 15 tablet 0    sertraline (ZOLOFT) 25 MG tablet Take 1 tablet (25 mg total) by mouth once daily. 30 tablet 1    traMADoL (ULTRAM) 50 mg tablet Take 1 tablet (50 mg total) by mouth every 6 (six) hours as needed for Pain. 20 tablet 0    tamsulosin (FLOMAX) 0.4 mg Cap Take 1 capsule (0.4 mg total) by mouth once daily. 30 capsule 0     No current facility-administered medications for this visit.       Family History:   Family History   Problem Relation Name Age of Onset    Migraines Mother      Hypertension Mother      Diabetes Father      Diabetes Paternal Aunt      Sarcoidosis Paternal Aunt      Diabetes Paternal Grandfather      Emphysema Maternal Grandfather      COPD Maternal Grandfather         Social History:   Social History     Socioeconomic History    Marital status:    Tobacco Use    Smoking status: Never    Smokeless tobacco: Never   Substance and Sexual Activity    Alcohol use: Never    Drug use: Never     Social Determinants of Health     Financial Resource Strain: Patient Declined (7/24/2024)    Overall Financial Resource Strain (CARDIA)     Difficulty of Paying Living Expenses: Patient declined   Recent Concern: Financial Resource Strain - High Risk (7/21/2024)    Overall Financial Resource Strain (CARDIA)     Difficulty of Paying Living Expenses: Hard   Food Insecurity: Patient Declined (7/24/2024)    Hunger Vital Sign     Worried About Running Out of Food in the Last Year: Patient declined     Ran Out of Food in the Last Year: Patient declined   Transportation Needs: No Transportation Needs (7/21/2024)    TRANSPORTATION NEEDS     Transportation : No   Physical Activity: Unknown (7/24/2024)    Exercise Vital Sign     Days of Exercise per Week: Patient declined     Minutes of Exercise per Session: 20 min   Recent Concern: Physical Activity - Insufficiently Active (7/21/2024)    Exercise  Vital Sign     Days of Exercise per Week: 3 days     Minutes of Exercise per Session: 20 min   Stress: Stress Concern Present (7/24/2024)    [employer] - Occupational Stress Questionnaire     Feeling of Stress : Very much   Housing Stability: Unknown (7/24/2024)    Housing Stability Vital Sign     Unable to Pay for Housing in the Last Year: Patient declined     Homeless in the Last Year: No   Recent Concern: Housing Stability - High Risk (7/21/2024)    Housing Stability Vital Sign     Unable to Pay for Housing in the Last Year: Yes     Homeless in the Last Year: No       Review of Systems   Constitutional:  Positive for weight loss. Negative for fever.   Gastrointestinal:  Positive for abdominal pain. Negative for anorexia, constipation, diarrhea, flatus, hematochezia, melena, nausea and vomiting.   Genitourinary:  Positive for hematuria. Negative for dysuria and frequency.   Musculoskeletal:  Negative for arthralgias and myalgias.   Neurological:  Negative for headaches.       Objective:     Vitals:    08/12/24 1048   BP: 100/82   Pulse: 88   Resp: 18   Temp: 98.7 °F (37.1 °C)        Physical Exam  Vitals and nursing note reviewed.   Constitutional:       Appearance: He is well-developed. He is not diaphoretic.   HENT:      Head: Normocephalic.   Eyes:      Conjunctiva/sclera: Conjunctivae normal.      Pupils: Pupils are equal, round, and reactive to light.   Cardiovascular:      Rate and Rhythm: Normal rate and regular rhythm.      Heart sounds: Normal heart sounds. No murmur heard.     No friction rub. No gallop.   Pulmonary:      Effort: Pulmonary effort is normal. No respiratory distress.      Breath sounds: Normal breath sounds. No stridor. No wheezing or rales.   Chest:      Chest wall: No tenderness.   Skin:     General: Skin is warm and dry.   Psychiatric:         Behavior: Behavior normal.         Thought Content: Thought content normal.         Judgment: Judgment normal.        Repeat urinalysis shows plus three blood and +1 protein but no leukocytes nitrites specific gravity was 10 20.  Assessment:       1. Left lower quadrant abdominal pain this seems to be improving as does hematuria.   2. Gross hematuria        Plan:       Yovani was seen today for ct results and abdominal pain.    Diagnoses and all orders for this visit:    Left lower quadrant abdominal pain he did have an extended course of antibiotics because of the kidney stone and feels it may be some antibiotic induced colitis he is taking probiotics now.  -     POCT Urinalysis, Dipstick, Automated, W/O Scope    Gross hematuria  -     CBC W/ AUTO DIFFERENTIAL; Future         Follow up in about 2 weeks (around 8/26/2024), or With Bindu hinton.  Follow-up with cardiology as scheduled

## 2024-08-28 ENCOUNTER — OFFICE VISIT (OUTPATIENT)
Dept: CARDIOLOGY | Facility: CLINIC | Age: 39
End: 2024-08-28
Payer: COMMERCIAL

## 2024-08-28 VITALS
HEIGHT: 68 IN | BODY MASS INDEX: 24.19 KG/M2 | OXYGEN SATURATION: 97 % | SYSTOLIC BLOOD PRESSURE: 122 MMHG | HEART RATE: 88 BPM | DIASTOLIC BLOOD PRESSURE: 62 MMHG | WEIGHT: 159.63 LBS

## 2024-08-28 DIAGNOSIS — Z13.6 ENCOUNTER FOR SCREENING FOR CARDIOVASCULAR DISORDERS: Primary | ICD-10-CM

## 2024-08-28 DIAGNOSIS — I26.99 BILATERAL PULMONARY EMBOLISM: ICD-10-CM

## 2024-08-28 PROCEDURE — 1159F MED LIST DOCD IN RCRD: CPT | Mod: CPTII,S$GLB,, | Performed by: STUDENT IN AN ORGANIZED HEALTH CARE EDUCATION/TRAINING PROGRAM

## 2024-08-28 PROCEDURE — 1160F RVW MEDS BY RX/DR IN RCRD: CPT | Mod: CPTII,S$GLB,, | Performed by: STUDENT IN AN ORGANIZED HEALTH CARE EDUCATION/TRAINING PROGRAM

## 2024-08-28 PROCEDURE — 99204 OFFICE O/P NEW MOD 45 MIN: CPT | Mod: S$GLB,,, | Performed by: STUDENT IN AN ORGANIZED HEALTH CARE EDUCATION/TRAINING PROGRAM

## 2024-08-28 PROCEDURE — 99999 PR PBB SHADOW E&M-EST. PATIENT-LVL IV: CPT | Mod: PBBFAC,,, | Performed by: STUDENT IN AN ORGANIZED HEALTH CARE EDUCATION/TRAINING PROGRAM

## 2024-08-28 PROCEDURE — 3008F BODY MASS INDEX DOCD: CPT | Mod: CPTII,S$GLB,, | Performed by: STUDENT IN AN ORGANIZED HEALTH CARE EDUCATION/TRAINING PROGRAM

## 2024-08-28 PROCEDURE — 3074F SYST BP LT 130 MM HG: CPT | Mod: CPTII,S$GLB,, | Performed by: STUDENT IN AN ORGANIZED HEALTH CARE EDUCATION/TRAINING PROGRAM

## 2024-08-28 PROCEDURE — 3078F DIAST BP <80 MM HG: CPT | Mod: CPTII,S$GLB,, | Performed by: STUDENT IN AN ORGANIZED HEALTH CARE EDUCATION/TRAINING PROGRAM

## 2024-08-28 PROCEDURE — 3044F HG A1C LEVEL LT 7.0%: CPT | Mod: CPTII,S$GLB,, | Performed by: STUDENT IN AN ORGANIZED HEALTH CARE EDUCATION/TRAINING PROGRAM

## 2024-09-03 ENCOUNTER — OFFICE VISIT (OUTPATIENT)
Dept: FAMILY MEDICINE | Facility: CLINIC | Age: 39
End: 2024-09-03
Payer: COMMERCIAL

## 2024-09-03 VITALS
HEART RATE: 73 BPM | BODY MASS INDEX: 23.97 KG/M2 | WEIGHT: 158.13 LBS | SYSTOLIC BLOOD PRESSURE: 104 MMHG | HEIGHT: 68 IN | TEMPERATURE: 98 F | DIASTOLIC BLOOD PRESSURE: 82 MMHG | OXYGEN SATURATION: 97 %

## 2024-09-03 DIAGNOSIS — I26.99 BILATERAL PULMONARY EMBOLISM: ICD-10-CM

## 2024-09-03 DIAGNOSIS — F41.1 GENERALIZED ANXIETY DISORDER: Primary | ICD-10-CM

## 2024-09-03 DIAGNOSIS — K21.9 GASTROESOPHAGEAL REFLUX DISEASE, UNSPECIFIED WHETHER ESOPHAGITIS PRESENT: ICD-10-CM

## 2024-09-03 DIAGNOSIS — R05.1 ACUTE COUGH: ICD-10-CM

## 2024-09-03 DIAGNOSIS — R09.A2 SENSATION OF LUMP IN THROAT: ICD-10-CM

## 2024-09-03 PROCEDURE — 1160F RVW MEDS BY RX/DR IN RCRD: CPT | Mod: CPTII,S$GLB,, | Performed by: NURSE PRACTITIONER

## 2024-09-03 PROCEDURE — G2211 COMPLEX E/M VISIT ADD ON: HCPCS | Mod: 95,S$GLB,, | Performed by: NURSE PRACTITIONER

## 2024-09-03 PROCEDURE — 99214 OFFICE O/P EST MOD 30 MIN: CPT | Mod: S$GLB,,, | Performed by: NURSE PRACTITIONER

## 2024-09-03 PROCEDURE — 3008F BODY MASS INDEX DOCD: CPT | Mod: CPTII,S$GLB,, | Performed by: NURSE PRACTITIONER

## 2024-09-03 PROCEDURE — 3044F HG A1C LEVEL LT 7.0%: CPT | Mod: CPTII,S$GLB,, | Performed by: NURSE PRACTITIONER

## 2024-09-03 PROCEDURE — 99999 PR PBB SHADOW E&M-EST. PATIENT-LVL III: CPT | Mod: PBBFAC,,, | Performed by: NURSE PRACTITIONER

## 2024-09-03 PROCEDURE — 3074F SYST BP LT 130 MM HG: CPT | Mod: CPTII,S$GLB,, | Performed by: NURSE PRACTITIONER

## 2024-09-03 PROCEDURE — 3079F DIAST BP 80-89 MM HG: CPT | Mod: CPTII,S$GLB,, | Performed by: NURSE PRACTITIONER

## 2024-09-03 PROCEDURE — 1159F MED LIST DOCD IN RCRD: CPT | Mod: CPTII,S$GLB,, | Performed by: NURSE PRACTITIONER

## 2024-09-03 RX ORDER — AZITHROMYCIN 250 MG/1
TABLET, FILM COATED ORAL
Qty: 6 TABLET | Refills: 0 | Status: SHIPPED | OUTPATIENT
Start: 2024-09-03 | End: 2024-09-08

## 2024-09-03 RX ORDER — SERTRALINE HYDROCHLORIDE 50 MG/1
50 TABLET, FILM COATED ORAL DAILY
Qty: 30 TABLET | Refills: 11 | Status: SHIPPED | OUTPATIENT
Start: 2024-09-03

## 2024-09-03 RX ORDER — PANTOPRAZOLE SODIUM 40 MG/1
40 TABLET, DELAYED RELEASE ORAL DAILY
Qty: 90 TABLET | Refills: 1 | Status: SHIPPED | OUTPATIENT
Start: 2024-09-03

## 2024-09-03 NOTE — PROGRESS NOTES
SUBJECTIVE:      Patient ID: Yovani Melvin is a 39 y.o. male.    Chief Complaint: Follow-up (1 month follow up on anxiety)    39-year-old male presents to the clinic for anxiety follow-up. Previously failed Buspar due to side effects. He is seeing a therapist and was referred to psychiatry. He is on the waiting list for psychiatry. Patient was started on Zoloft 25 mg at the last office visit. He reports side effects of panic attacks, night sweats, and diarrhea the first week. Symptoms started fading by the 2nd week. He states his anxiety hasn't improved much, but feels that it is helping. No panic attacks after the first week of initiating the meds. Reports having anxiety in crowds and everyday stress. His sisters recently started Lexapro and is doing well on it.     He has been having a cough for a few weeks. He is having coughing spells, followed by sneezing, and belching. Reports feeling a tickle in his throat. The cough is improving. Reports hx of heartburn, which improved with diet. He reports feeling a lump in his throat on the left side when he swallows. He cannot feel the lump, but notices it every time he swallows.          Family History   Problem Relation Name Age of Onset    Migraines Mother      Hypertension Mother      Diabetes Father      Diabetes Paternal Aunt      Sarcoidosis Paternal Aunt      Diabetes Paternal Grandfather      Emphysema Maternal Grandfather      COPD Maternal Grandfather        Social History     Socioeconomic History    Marital status:    Tobacco Use    Smoking status: Never    Smokeless tobacco: Never   Substance and Sexual Activity    Alcohol use: Never    Drug use: Never     Social Determinants of Health     Financial Resource Strain: Patient Declined (7/24/2024)    Overall Financial Resource Strain (CARDIA)     Difficulty of Paying Living Expenses: Patient declined   Recent Concern: Financial Resource Strain - High Risk (7/21/2024)    Overall Financial Resource  Strain (CARDIA)     Difficulty of Paying Living Expenses: Hard   Food Insecurity: Patient Declined (7/24/2024)    Hunger Vital Sign     Worried About Running Out of Food in the Last Year: Patient declined     Ran Out of Food in the Last Year: Patient declined   Transportation Needs: No Transportation Needs (7/21/2024)    TRANSPORTATION NEEDS     Transportation : No   Physical Activity: Unknown (7/24/2024)    Exercise Vital Sign     Days of Exercise per Week: Patient declined     Minutes of Exercise per Session: 20 min   Recent Concern: Physical Activity - Insufficiently Active (7/21/2024)    Exercise Vital Sign     Days of Exercise per Week: 3 days     Minutes of Exercise per Session: 20 min   Stress: Stress Concern Present (7/24/2024)    [employer] - Occupational Stress Questionnaire     Feeling of Stress : Very much   Housing Stability: Unknown (7/24/2024)    Housing Stability Vital Sign     Unable to Pay for Housing in the Last Year: Patient declined     Homeless in the Last Year: No   Recent Concern: Housing Stability - High Risk (7/21/2024)    Housing Stability Vital Sign     Unable to Pay for Housing in the Last Year: Yes     Homeless in the Last Year: No     Current Outpatient Medications   Medication Sig Dispense Refill    apixaban (ELIQUIS) 5 mg Tab Take 1 tablet (5 mg total) by mouth 2 (two) times daily. Take 10mg bid x7 days, then 5mg bid 180 tablet 1    azithromycin (Z-RAQUEL) 250 MG tablet Take 2 tablets by mouth on day 1; Take 1 tablet by mouth on days 2-5 6 tablet 0    ketorolac (TORADOL) 10 mg tablet Take 10 mg by mouth every 6 (six) hours. (Patient not taking: Reported on 9/3/2024)      ondansetron (ZOFRAN-ODT) 4 MG TbDL Take 1 tablet (4 mg total) by mouth every 8 (eight) hours as needed (nausea/vomiting). (Patient not taking: Reported on 9/3/2024) 15 tablet 0    pantoprazole (PROTONIX) 40 MG tablet Take 1 tablet (40 mg total) by mouth once daily. 90 tablet 1    sertraline  (ZOLOFT) 50 MG tablet Take 1 tablet (50 mg total) by mouth once daily. 30 tablet 11    tamsulosin (FLOMAX) 0.4 mg Cap Take 1 capsule (0.4 mg total) by mouth once daily. (Patient not taking: Reported on 9/3/2024) 30 capsule 0    traMADoL (ULTRAM) 50 mg tablet Take 1 tablet (50 mg total) by mouth every 6 (six) hours as needed for Pain. (Patient not taking: Reported on 9/3/2024) 20 tablet 0     No current facility-administered medications for this visit.     Review of patient's allergies indicates:   Allergen Reactions    Aspirin Palpitations and Shortness Of Breath    Hydrocodone     Penicillins       Past Medical History:   Diagnosis Date    Nephrolithiasis     Other pulmonary embolism without acute cor pulmonale      Past Surgical History:   Procedure Laterality Date    CYSTOURETEROSCOPY, WITH HOLMIUM LASER LITHOTRIPSY OF URETERAL CALCULUS AND STENT INSERTION Left 7/5/2024    Procedure: CYSTOURETEROSCOPY, WITH HOLMIUM LASER LITHOTRIPSY OF URETERAL CALCULUS AND STENT INSERTION;  Surgeon: Milana Encinas MD;  Location: [facility];  Service: Urology;  Laterality: Left;    URETEROSCOPIC REMOVAL OF URETERIC CALCULUS N/A 7/5/2024    Procedure: REMOVAL, CALCULUS, URETER, URETEROSCOPIC;  Surgeon: Milana Encinas MD;  Location: [facility];  Service: Urology;  Laterality: N/A;       Review of Systems   Constitutional:  Negative for activity change, appetite change, chills, diaphoresis, fatigue, fever and unexpected weight change.   HENT:  Positive for trouble swallowing. Negative for hearing loss and rhinorrhea.    Eyes:  Negative for discharge and visual disturbance.   Respiratory:  Positive for cough. Negative for chest tightness, shortness of breath and wheezing.    Cardiovascular:  Negative for chest pain and palpitations.   Gastrointestinal:  Negative for blood in stool, constipation, diarrhea and vomiting.   Endocrine: Negative for polydipsia and polyuria.   Genitourinary:  Negative for difficulty urinating,  "hematuria and urgency.   Musculoskeletal:  Negative for arthralgias, joint swelling and neck pain.   Neurological:  Negative for weakness and headaches.   Psychiatric/Behavioral:  Negative for confusion and dysphoric mood. The patient is nervous/anxious.       OBJECTIVE:      Vitals:    09/03/24 1335   BP: 104/82   Pulse: 73   Temp: 98 °F (36.7 °C)   TempSrc: Oral   SpO2: 97%   Weight: 71.7 kg (158 lb 1.6 oz)   Height: 5' 8" (1.727 m)     Physical Exam  Vitals and nursing note reviewed.   Constitutional:       General: He is awake. He is not in acute distress.     Appearance: Normal appearance. He is well-developed, well-groomed and normal weight. He is not ill-appearing, toxic-appearing or diaphoretic.   HENT:      Head: Normocephalic and atraumatic.      Right Ear: Tympanic membrane, ear canal and external ear normal.      Left Ear: Tympanic membrane, ear canal and external ear normal.      Nose: Nose normal.   Eyes:      General: Lids are normal. Gaze aligned appropriately.      Conjunctiva/sclera: Conjunctivae normal.      Right eye: Right conjunctiva is not injected.      Left eye: Left conjunctiva is not injected.      Pupils: Pupils are equal, round, and reactive to light.   Neck:      Thyroid: No thyroid mass, thyromegaly or thyroid tenderness.      Comments: No palpable mass on the left side.   Cardiovascular:      Rate and Rhythm: Normal rate and regular rhythm.      Pulses: Normal pulses.      Heart sounds: Normal heart sounds, S1 normal and S2 normal. No murmur heard.     No friction rub. No gallop.   Pulmonary:      Effort: Pulmonary effort is normal. No respiratory distress.      Breath sounds: Normal breath sounds. No stridor. No decreased breath sounds, wheezing, rhonchi or rales.   Chest:      Chest wall: No tenderness.   Musculoskeletal:      Cervical back: Neck supple.      Right lower leg: No edema.      Left lower leg: No edema.   Lymphadenopathy:      Cervical: No cervical adenopathy.   Skin:   "   General: Skin is warm and dry.      Capillary Refill: Capillary refill takes less than 2 seconds.      Findings: No erythema or rash.   Neurological:      Mental Status: He is alert and oriented to person, place, and time. Mental status is at baseline.   Psychiatric:         Attention and Perception: Attention normal.         Mood and Affect: Mood normal.         Speech: Speech normal.         Behavior: Behavior normal. Behavior is cooperative.         Thought Content: Thought content normal.         Judgment: Judgment normal.        Assessment:       1. Generalized anxiety disorder    2. Sensation of lump in throat    3. Gastroesophageal reflux disease, unspecified whether esophagitis present    4. Acute cough    5. Bilateral pulmonary embolism        Plan:       Generalized anxiety disorder  Zoloft increased to 50 mg. Can increased to 100 mg in 2-3 weeks, which was discussed. If no improvement will switch to Lexapro.   -     sertraline (ZOLOFT) 50 MG tablet; Take 1 tablet (50 mg total) by mouth once daily.  Dispense: 30 tablet; Refill: 11    Sensation of lump in throat  No palpable mass or lump noted in the area of concern. Only notices it when swallowing. Discussed referral to GI for EGD, but patient is hesitant at this time. He does have high anxiety, discussed possibility of a globus sensation.     Gastroesophageal reflux disease, unspecified whether esophagitis present  Start Protonix, GERD may be contributing to his cough and tickle in his throat. Discussed LPR can cause a cough.   -     pantoprazole (PROTONIX) 40 MG tablet; Take 1 tablet (40 mg total) by mouth once daily.  Dispense: 90 tablet; Refill: 1    Acute cough  BBS clear, reports cough x3 weeks. Will treat with Z-pack. Discuss he is likely having a cough related to a recent viral illness and now has bronchitis vs GERD.   -     azithromycin (Z-RAQUEL) 250 MG tablet; Take 2 tablets by mouth on day 1; Take 1 tablet by mouth on days 2-5  Dispense: 6  tablet; Refill: 0    Bilateral pulmonary embolism  Continue Eliquis.   -     apixaban (ELIQUIS) 5 mg Tab; Take 1 tablet (5 mg total) by mouth 2 (two) times daily. Take 10mg bid x7 days, then 5mg bid  Dispense: 180 tablet; Refill: 1    This note was created using [email or web address] voice recognition software that occasionally misinterprets phrases or words.     I spent a total of 33 minutes on the day of the visit.This includes face to face time and non-face to face time preparing to see the patient (eg, review of tests), obtaining and/or reviewing separately obtained history, documenting clinical information in the electronic or other health record, independently interpreting results and communicating results to the patient/family/caregiver, or care coordinator.    Follow up in about 1 month (around 10/3/2024) for Anxiety.          9/3/2024 JASON Rodas, [facility]

## 2024-09-10 ENCOUNTER — HOSPITAL ENCOUNTER (OUTPATIENT)
Dept: RADIOLOGY | Facility: HOSPITAL | Age: 39
Discharge: HOME | End: 2024-09-10
Attending: INTERNAL MEDICINE
Payer: COMMERCIAL

## 2024-09-10 DIAGNOSIS — I26.99 ACUTE PULMONARY EMBOLISM WITHOUT ACUTE COR PULMONALE, UNSPECIFIED PULMONARY EMBOLISM TYPE: ICD-10-CM

## 2024-09-10 PROCEDURE — 25500020 PHARM REV CODE 255: Performed by: INTERNAL MEDICINE

## 2024-09-10 PROCEDURE — 71275 CT ANGIOGRAPHY CHEST: CPT | Mod: TC

## 2024-09-10 PROCEDURE — 71275 CT ANGIOGRAPHY CHEST: CPT | Mod: 26,,, | Performed by: RADIOLOGY

## 2024-09-10 RX ADMIN — IOHEXOL 100 ML: 350 INJECTION, SOLUTION INTRAVENOUS at 10:09

## 2024-09-13 ENCOUNTER — OFFICE VISIT (OUTPATIENT)
Dept: HEMATOLOGY/ONCOLOGY | Facility: CLINIC | Age: 39
End: 2024-09-13
Payer: COMMERCIAL

## 2024-09-13 VITALS
TEMPERATURE: 97 F | BODY MASS INDEX: 24.02 KG/M2 | OXYGEN SATURATION: 98 % | HEIGHT: 68 IN | SYSTOLIC BLOOD PRESSURE: 117 MMHG | DIASTOLIC BLOOD PRESSURE: 76 MMHG | HEART RATE: 80 BPM | WEIGHT: 158.5 LBS

## 2024-09-13 DIAGNOSIS — R07.9 CHEST PAIN, UNSPECIFIED TYPE: Primary | ICD-10-CM

## 2024-09-13 DIAGNOSIS — I26.99 BILATERAL PULMONARY EMBOLISM: ICD-10-CM

## 2024-09-13 PROCEDURE — 99999 PR PBB SHADOW E&M-EST. PATIENT-LVL III: CPT | Mod: PBBFAC,,, | Performed by: INTERNAL MEDICINE

## 2024-09-13 NOTE — PROGRESS NOTES
Subjective:       Patient ID: Yovani Melvin is a 39 y.o. male.    Chief Complaint:  f/u    HPI  39-year-old with a history of kidney stones and left ureteral stent went to the emergency room July 2024 diagnosed with PE any complaint of chest pain started  Eliquis first went to one hospital 2 days later went again 7/21 told he had pe /7/24 was told no pE  Nofamily hx grandfather ?? Clot, had kidney stent 7/5 tooth pulled end of may 2024 was in bed for a few days after stent  Review of Systems      Patient denies issues related to appetite or recent weight change.  Feels well overall.  Denies issues with generalized weakness .  Denies fatigue over above what is normally experienced with day-to-day activities  Denies fever, chills, rigors  Denies issues with ambulation  Denies generalized swelling or new lumps and bumps felt in any part  of body  Denies visual or hearing loss  Denies issues with congestion, sinus issues, cough, sputum production runny nose or itching eyes  Denies chest pain ++ palipitaions or passing out some chect pressure  Denies abdominal pain, reflux symptoms, nausea vomiting loose stools or constipation  Denies seizure activity or focal weaknesses or symptoms related to TIA, no head aches or blurred vision reported  Denies issues with skin rash or bruising  Denies issues with swelling of feet, tingling or numbness   No issues with sleep,   No recent foreign travel   Good family support reported       Past Medical History:   Diagnosis Date    Nephrolithiasis     Other pulmonary embolism without acute cor pulmonale      Past Surgical History:   Procedure Laterality Date    CYSTOURETEROSCOPY, WITH HOLMIUM LASER LITHOTRIPSY OF URETERAL CALCULUS AND STENT INSERTION Left 7/5/2024    Procedure: CYSTOURETEROSCOPY, WITH HOLMIUM LASER LITHOTRIPSY OF URETERAL CALCULUS AND STENT INSERTION;  Surgeon: Milana Encinas MD;  Location: [facility];  Service: Urology;  Laterality: Left;    URETEROSCOPIC REMOVAL OF  URETERIC CALCULUS N/A 7/5/2024    Procedure: REMOVAL, CALCULUS, URETER, URETEROSCOPIC;  Surgeon: Milana Encinas MD;  Location: [facility];  Service: Urology;  Laterality: N/A;     Family History   Problem Relation Name Age of Onset    Migraines Mother      Hypertension Mother      Diabetes Father      Diabetes Paternal Aunt      Sarcoidosis Paternal Aunt      Diabetes Paternal Grandfather      Emphysema Maternal Grandfather      COPD Maternal Grandfather        Social History     Socioeconomic History    Marital status:    Tobacco Use    Smoking status: Never    Smokeless tobacco: Never   Substance and Sexual Activity    Alcohol use: Never    Drug use: Never     Social Determinants of Health     Financial Resource Strain: Patient Declined (7/24/2024)    Overall Financial Resource Strain (CARDIA)     Difficulty of Paying Living Expenses: Patient declined   Recent Concern: Financial Resource Strain - High Risk (7/21/2024)    Overall Financial Resource Strain (CARDIA)     Difficulty of Paying Living Expenses: Hard   Food Insecurity: Patient Declined (7/24/2024)    Hunger Vital Sign     Worried About Running Out of Food in the Last Year: Patient declined     Ran Out of Food in the Last Year: Patient declined   Transportation Needs: No Transportation Needs (7/21/2024)    TRANSPORTATION NEEDS     Transportation : No   Physical Activity: Unknown (7/24/2024)    Exercise Vital Sign     Days of Exercise per Week: Patient declined     Minutes of Exercise per Session: 20 min   Recent Concern: Physical Activity - Insufficiently Active (7/21/2024)    Exercise Vital Sign     Days of Exercise per Week: 3 days     Minutes of Exercise per Session: 20 min   Stress: Stress Concern Present (7/24/2024)    [employer] [place] [employer] - Occupational Stress Questionnaire     Feeling of Stress : Very much   Housing Stability: Unknown (7/24/2024)    Housing Stability Vital Sign     Unable to Pay for Housing in the Last  Year: Patient declined     Homeless in the Last Year: No   Recent Concern: Housing Stability - High Risk (7/21/2024)    Housing Stability Vital Sign     Unable to Pay for Housing in the Last Year: Yes     Homeless in the Last Year: No     Review of patient's allergies indicates:   Allergen Reactions    Aspirin Palpitations and Shortness Of Breath    Hydrocodone     Penicillins        Current Outpatient Medications:     apixaban (ELIQUIS) 5 mg Tab, Take 1 tablet (5 mg total) by mouth 2 (two) times daily. Take 10mg bid x7 days, then 5mg bid, Disp: 180 tablet, Rfl: 1    sertraline (ZOLOFT) 50 MG tablet, Take 1 tablet (50 mg total) by mouth once daily., Disp: 30 tablet, Rfl: 11    ketorolac (TORADOL) 10 mg tablet, Take 10 mg by mouth every 6 (six) hours. (Patient not taking: Reported on 9/3/2024), Disp: , Rfl:     ondansetron (ZOFRAN-ODT) 4 MG TbDL, Take 1 tablet (4 mg total) by mouth every 8 (eight) hours as needed (nausea/vomiting). (Patient not taking: Reported on 9/3/2024), Disp: 15 tablet, Rfl: 0    pantoprazole (PROTONIX) 40 MG tablet, Take 1 tablet (40 mg total) by mouth once daily. (Patient not taking: Reported on 9/13/2024), Disp: 90 tablet, Rfl: 1    tamsulosin (FLOMAX) 0.4 mg Cap, Take 1 capsule (0.4 mg total) by mouth once daily. (Patient not taking: Reported on 9/3/2024), Disp: 30 capsule, Rfl: 0    traMADoL (ULTRAM) 50 mg tablet, Take 1 tablet (50 mg total) by mouth every 6 (six) hours as needed for Pain. (Patient not taking: Reported on 9/3/2024), Disp: 20 tablet, Rfl: 0    Physical Exam    Wt Readings from Last 3 Encounters:   09/13/24 71.9 kg (158 lb 8.2 oz)   09/03/24 71.7 kg (158 lb 1.6 oz)   08/28/24 72.4 kg (159 lb 9.8 oz)     Temp Readings from Last 3 Encounters:   09/03/24 98 °F (36.7 °C) (Oral)   08/12/24 98.7 °F (37.1 °C) (Oral)   07/30/24 98.2 °F (36.8 °C) (Temporal)     BP Readings from Last 3 Encounters:   09/03/24 104/82   08/28/24 122/62   08/12/24 100/82     Pulse Readings from Last 3  Encounters:   09/03/24 73   08/28/24 88   08/12/24 88    VITAL SIGNS:  as above   GENERAL: appears well-built, well-nourished.  No anxiety, no agitation, and in no distress.  Patient is awake, alert, oriented and cooperative.  HEENT:  Showed no congestion. Trachea is central no obvious icterus or pallor noted no hoarseness. no obvious JVD   NECK:  Supple.  No JVD. No obvious cervical submental or supraclavicular adenopathy.  RS:the visualized portion of  Chest expands well. chest appears symmetric, no audible wheezes.  No dyspnea recognized  ABDOMEN:  abdomen appears undistended.  EXTREMITIES:  Without edema.  NEUROLOGICAL:  The patient is appropriate, higher functions are normal.  No  obvious neurological deficits.  normal judgement normal thought content  No confusion, no speech impediment. Cranial nerves are intact and show no deficit. No gross motor deficits noted   SKIN MUSCULOSKELETAL: no joint or skeletal deformity, no clubbing of nails.  No visible rash ecchymosis or petechiae     Lab Results   Component Value Date    WBC 4.92 08/12/2024    HGB 15.4 08/12/2024    HCT 49.0 08/12/2024    MCV 94 08/12/2024     08/12/2024       BMP  Lab Results   Component Value Date     07/23/2024    K 4.0 07/23/2024     07/23/2024    CO2 19 (L) 07/23/2024    BUN 14 07/23/2024    CREATININE 1.0 07/23/2024    CALCIUM 8.8 07/23/2024    ANIONGAP 13 07/23/2024    ESTGFRAFRICA >60 11/06/2019    EGFRNONAA >60 11/06/2019 July 21, 2024  Impression:     Probable small tertiary artery pulmonary emboli bilaterally.  Bilateral dependent atelectasis.  Small area of atelectasis in the left lingula.    July 23, 2024  Impression:     1.  Resolution of the tertiary pulmonary emboli in the lung bases.  No new pulmonary emboli.     2.  Atelectasis in lung bases and lingula.     Patient Active Problem List   Diagnosis    Ureteral stone    Acute cystitis with hematuria    Bilateral pulmonary embolism        Assessment and Plan      Possible PE diagnosed on CTA July 21, 2024 but showing resolution by July 23, 2024 ( he thinks he had a panic attack) this was post urethral stenting procedure  Repeat CTA September 20 24- for PE  had no dvt on doppler 7/24   Pt wants to be very safe . Yudelka stay on anicaog for total 6 months and get repeat usg and cta [facility] and see me . If still neg will come off eliquis for hypercoag w/u see me with above in 2/25    Palpitation:refer  to cardiology for w/u  Kidensy stones and occ hematuria> stent is out    Anxiety attacks: sees psychaitry to help    MDM includes  :    - Acute or chronic illness or injury that poses a threat to life or bodily function  - Independent review and explanation of 3+ results from unique tests  - Discussion of management and ordering 3+ unique tests  - Extensive discussion of treatment and management  - Prescription drug management  - Drug therapy requiring intensive monitoring for toxicity

## 2024-09-24 ENCOUNTER — OFFICE VISIT (OUTPATIENT)
Dept: PSYCHIATRY | Facility: CLINIC | Age: 39
End: 2024-09-24
Payer: COMMERCIAL

## 2024-09-24 VITALS
BODY MASS INDEX: 23.99 KG/M2 | WEIGHT: 158.31 LBS | DIASTOLIC BLOOD PRESSURE: 83 MMHG | HEIGHT: 68 IN | HEART RATE: 77 BPM | SYSTOLIC BLOOD PRESSURE: 127 MMHG

## 2024-09-24 DIAGNOSIS — F32.0 CURRENT MILD EPISODE OF MAJOR DEPRESSIVE DISORDER WITHOUT PRIOR EPISODE: Primary | ICD-10-CM

## 2024-09-24 DIAGNOSIS — F41.1 GENERALIZED ANXIETY DISORDER WITH PANIC ATTACKS: ICD-10-CM

## 2024-09-24 DIAGNOSIS — F41.0 GENERALIZED ANXIETY DISORDER WITH PANIC ATTACKS: ICD-10-CM

## 2024-09-24 DIAGNOSIS — F40.10 SOCIAL ANXIETY DISORDER: ICD-10-CM

## 2024-09-24 PROCEDURE — 99999 PR PBB SHADOW E&M-EST. PATIENT-LVL IV: CPT | Mod: PBBFAC,,, | Performed by: STUDENT IN AN ORGANIZED HEALTH CARE EDUCATION/TRAINING PROGRAM

## 2024-09-24 RX ORDER — SERTRALINE HYDROCHLORIDE 50 MG/1
50 TABLET, FILM COATED ORAL DAILY
Qty: 30 TABLET | Refills: 1 | Status: SHIPPED | OUTPATIENT
Start: 2024-09-24 | End: 2024-11-23

## 2024-09-24 RX ORDER — HYDROXYZINE HYDROCHLORIDE 10 MG/1
10-20 TABLET, FILM COATED ORAL 3 TIMES DAILY PRN
Qty: 90 TABLET | Refills: 2 | Status: SHIPPED | OUTPATIENT
Start: 2024-09-24 | End: 2024-12-23

## 2024-09-24 NOTE — PROGRESS NOTES
"    Outpatient Psychiatry Initial Visit (DO/MD/NP, etc.)  PSYCHIATRIC EVALUATION ("EVAL")    9/24/2024    Referral from:  Mumtaz Fountain, CHINO-C  [address] [facility] [facility]  [address]  [place] [address]    Assessment    Case Formulation & Assessments:     Impression Pt with recent significant medical illness is experiencing improvements in MDD symptoms with ZOLOFT. Pt's anxiety symptoms can be improved further with additional pharmacotherapy and continuing current psychotherapy. Diagnostic clarity will be needed in the future for potential neurodevelopmental disorders (ASD, ADHD).    Considering history, clinical presentation and instruments, the most likely mood disorder diagnosis is MDD. Clear RAMSEY.   Case Formulation     ICD-10-CM ICD-9-CM   1. Current mild episode of major depressive disorder without prior episode  F32.0 296.21   2. Social anxiety disorder  F40.10 300.23   3. Generalized anxiety disorder with panic attacks  F41.1 300.02    F41.0 300.01   4. BMI 24.0-24.9, adult  Z68.24 V85.1      Biomedical Organic and biomedical factors have the potential to influence the symptomatology.    Relevant biomedical factors include GI dysfunction, use of an anticoagulant, and weight gain concerns.    Psychotropics to avoid may include those which are anxiogenic, depressing, high risk for GI symptoms, and likely to cause weight gain.   Dimensional Temperament and personality traits predispose the patient to certain strengths and vulnerabilities. At this time the patient's temperament is unclear.    Life circumstances provoke responses in the form of neurotic symptoms;  STRESS APPRAISAL is influenced by both PERCEIVED HELPLESSNESS and a LACK OF SELF EFFICACY; pt experiences a lack of control over responses to stress and perceives an inability to handle problems.    Insufficient data is available to characterize other dimensions of the person, such as attachment patterns.   Behavioral N/A   Experiential " Illness  Unemployment   Prognosis This patient would benefit from treatment that includes both individual psychotherapy and pharmacotherapy.    Favorable prognostic factors include receptivity to treatment, relatively good health, established social support structures, having hobbies, and Yazidi practice   [place] Assessments   Safety Patient did not display signs of nor endorse symptoms of overt psychosis or acute mood disorder requiring hospitalization during the encounter. Patient denied violent thoughts or suicidal or homicidal ideation, intent, or plan.      Suicide risk assessment performed. Pt denies suicidal ideation, intent or plan. Pt has never attempted suicide in the past. Risk factors include anxiety and depression. Protective factors include wanting to live for the family and receptivity to treatment. Suicide risk at this time is LOW. Pt agrees to safety. No safety concerns identified at this time.     Firearm Access: []NO [x]Yes [x]Safely Stored  Legal History: NO    Case risk, violence risk, and suicide risk at this time are NOT high. Pt agrees to safety and no safety concerns were identified during the interview.   Goals Sleep: improve sleep hygiene  Anxiety: acquiring relapse prevention skills, reducing excessive behaviors associated with SALIENCE/APPREHENSION/ANXIOUS-AVOIDANCE, reducing safety behaviors, eliminating assumptions about dangerousness of anxiety, reducing assumptions about positive value of worry, modifying schemata of vulnerability and threats, and reducing RUMINATION, reduce negative automatic thoughts, reducing time spent worrying, modifying schemata of need for control  Depression: acquiring relapse prevention skills, reducing ANHEDONIA/CONTEXT INSENSITIVITY, increasing interest in usual activities, increasing motivation, reducing excessive GUILT, decreasing WORTHLESSNESS, reducing RUMINATION, increasing HOPE, increasing self-reward for positive thoughts and behaviors,  reducing NEGATIVE BIAS, reducing negative automatic thoughts  Stress/Panic: acquiring relapse prevention skills, acquiring breathing skills and reducing physical symptoms of anxiety/panic   Adherence  Barriers to adherence to treatment are unclear.   Liabilities Coping skills are deficient or poorly employed. Pt is naive to treatment with multiple suitable psychotropics.   Strengths Patient accepts guidance/feedback. Patient is expressive/articulate. Patient is motivated for change. Patient is stable.     Plan   Plan of Care:     Plan of Care & Medication Management    Chart was reviewed. The risks and benefits of medication were discussed with pt. The treatment plan and followup plan were reviewed with pt. Pt understands to contact clinic if symptoms worsen. Pt understands to call 911 or go to nearest ER for suicidal ideation, intent or plan.   RX History BUSPAR (panic attacks while taking), ZOLOFT   Current RX Start ATARAX/VISTARIL  Pt was provided [facility] educational material 9/24/2024  Adjustments:  9/24/2024: Start 10-20mg TID prn anxiety/insomnia  Prior to 9/24/2024, pt had been taking ZOLOFT 50mg for 1 week  Continue ZOLOFT  Adjustments:  9/24/2024: Continue ZOLOFT 50mg daily  Prior to 9/24/2024, pt had been taking ZOLOFT 50mg for 1 week   Education, Counseling & Monitoring []BOX BREATHING  []SLEEP HYGIENE  []THERAPY  [x]Gave Educational Material   []CONTRACT 9/24/2024?  [] REVIEWED 9/24/2024?  []NRT  []LABS    []TALON  []CAFF   []CANNABIS  []MARIO []ETOH []GDS []MINICOG []MOCA  []AIMS []ASRS []BI   []PCL5 []L2RTB  []   Other Orders Continue individual psychotherapy   RETURN K: RETURN IN 4 WEEKS (ONE MONTH), and reassess frequency within three visits from now        Subjective    History of Present Illness (HPI) & Review of Symptoms (ROS):     Yovani Melvin, a 39 y.o. male, presenting for initial evaluation visit.     Chart was reviewed. Available documentation has been reviewed, and pertinent elements of the chart  "have been incorporated into this note where appropriate.     General     Pt says, "I think I have had anxiety and ADHD my whole life and didn't know it." Pt reports a pulmonary embolism a few months ago has worsened his anxiety. Shortly after the event he had a multiple-week episode of depression. Since then he has been in psychotherapy and started low dose ZOLOFT and has been titrating it slowly; currently he is at 50mg/d for 1w.    Pt reports depression has improved significantly, as he is NO longer spending excessive amounts of time in bed, and he is less sad and irritable.    Pt also mentions some symptoms resembling ASD and ADHD, although pt is willing to undergo a more formal evaluation or testing for this when depression has improved.    Pt is open to continuing psychotherapy and current dose of ZOLOFT while adding ATARAX/VISTARIL.     Personal Functioning   Stress Financial strain. Health problems. See PSS4 below.   Mood Mood is less sad, less irritable, and crying spells have resolved. POSITIVE AFFECT Structures: ANHEDONIA noted. Diminished ability to achieve pleasure and purpose in things. Consummatory pleasure REDUCED. NEGATIVE AFFECT and DMN Structures: Feeling GUILT, HOPELESSNESS and WORTHLESSNESS. RUMINATION and NEGATIVE BIAS noted. Energy is improving. See instruments below.   Anxiety RUMINATION: Pt described repeated worry and inward focus on negative thoughts. SALIENCE/APPREHENSION: Pt described anxious arousal and apprehension. Amygdala-Centered Circuit: THREAT DYSREGULATION: Pt described panic attacks with classic symptoms. See history below. See instruments below.   Sleep Pt reports sleep as  improving . Sleep onset is quick. Duration is 5 to 7 hours interrupted by an early morning awakening.    Cognition []NO concerns  [x]Indecisiveness/concentration deficits  []Poor memory or worse than usual  [x]Distractibility  [x]Productivity affected   Feeding []NO concerns  []Increased  [x]Decreased  []>5% " weight change in one month  []Binge eating  []Restricted or disordered feeding   Interpersonal Functioning   Home Parenting stress.   Peers Deferred   Work Looking for a job.     History:     Psychiatric History - Diagnostic   Reported Diagnoses anxiety   Formal Testing NO   Symptom History General Psychosis: No history of psychosis.  Mood cycles: No episodes of hypomania, fredrick or mixed mood episodes.  DEPRESSION: NO prior depression episodes, onset few months ago.  ANXIETY: Symptoms onset childhood.  PANIC ATTACKS: positive history with classic symptoms, onset few months ago, most recent unclear.    Suicide Attempts NO    Self Harm NO   Psychiatric History - Treatment   Inpatient Treatment NONE   ACT, IOP, PHP NONE   Outpatient  Psychotherapy In weekly psychotherapy for 2months   Outpatient   Psych Med Mgmt NO   Medications Prior Psychotropics BUSPAR, ZOLOFT    Current Psychotropics ZOLOFT    Psychoactive Agents None   Personal Psychosocial and Medical History   Childhood and  Adolescence Born [birth year], raised by mother in [place]. Reports being home schooled. Reports some degree of neglect.   Marital   Status []sng []eng [x]mar   []wid []sep []div []3+ marriages   Children Two.   Residence lives with family   Occupation unemployed, previously a  and    Hobbies & Interests fishing, [employer]   Quaker Practice Episcopalian, Anabaptism-going, regularly prays   Education History Unclear education level, home schooled    History Deferred.   Abuse History Deferred.   Trauma History Medical.   HS with LOC NO   Seizure NO   Sexual History Deferred.   Family History    1st Degree 2nd+ Degree   Suicides []Parent(s)  []Sibling(s)  []Child(adolph)  []Unclear  [x]NO []Grandparent(s) []Grandchild(adolph)  []Aunt(s)/Uncle(s) []Niece(s)/Nephew(s)   []Half-sibling(s) [x]Cousin(s)/3+ degree  []Unclear   Substance Abuse []Parent(s)  []Sibling(s)  []Child(adolph)  []Unclear  [x]NO []Grandparent(s)  []Grandchild(adolph)  []Aunt(s)/Uncle(s) []Niece(s)/Nephew(s)   []Half-sibling(s) [x]Cousin(s)/3+ degree  []Unclear   Alcohol Abuse []Parent(s)  []Sibling(s)  []Child(adolph)  []Unclear  [x]NO []Grandparent(s) []Grandchild(adolph)  [x]Aunt(s)/Uncle(s) []Niece(s)/Nephew(s)   []Half-sibling(s) [x]Cousin(s)/3+ degree  []Unclear   Bipolar Disorders []Parent(s)  []Sibling(s)  []Child(adolph)  []Unclear  [x]NO []Grandparent(s) []Grandchild(adolph)  []Aunt(s)/Uncle(s) []Niece(s)/Nephew(s)   []Half-sibling(s) []Cousin(s)/3+ degree  []Unclear   Anxiety Disorders []Parent(s)  [x]Sibling(s)  []Child(adolph)  []Unclear  []NO []Grandparent(s) []Grandchild(adolph)  []Aunt(s)/Uncle(s) [x]Niece(s)/Nephew(s)   []Half-sibling(s) []Cousin(s)/3+ degree  []Unclear   Depressive Disorders [x]Parent(s)  []Sibling(s)  []Child(adolph)  [x]Unclear  []NO []Grandparent(s) []Grandchild(adolph)  []Aunt(s)/Uncle(s) []Niece(s)/Nephew(s)   []Half-sibling(s) []Cousin(s)/3+ degree  []Unclear   Other/Medical []ADHD [x]ASD []Hospitalizations []OCD   []PTSD []Schizophrenia/Schizoaffective     []CA []Dementia [x]DM []Heart []HTN []Stroke []Thyroid   Substance History   Alcohol []EYE OPENER  []CAGE 2+  []QUIT days:  [x]N []<2x/m []2-4x/m []2-3x/w []4+/w  Dr/d:   [x]<3 []3-4    []5-6     []7-9      []10+  6+ Dr: [x]N  []<1x/m []1x/m []1x/w     []>1x/w    AUDIT-C: 0-2 (LOW RISK).   Nicotine []Equiv 0.5+ppd   []Equiv <0.5ppd  []Prev. quit attempt(s)  [x]NONE/FORMER []Cigarettes   []Vape   []Pouch []Chew/Dip []Cigars   []NRT HX []CHANTIX HX   Caffeine [x]LOW/NONE  []HIGH   []W/D HA, etc. []AM []AFTERNOON []EVENING  []Coffee []Energy Drinks []Soda []Tea   []   Marijuana [x]0/4 NO use within a year  []1/4 LAST use 1+ma  []2/4 USING 1+ x/m or LAST USE 2+wa  []3/4 USING 3+ x/m  []4/4 USING 2+ x/w or QUIT within 2w  []Prescribed medical marijuana;  reviewed.   Other NO   Detox/Rehab NO     Auto-populated Subjective Chart Data:     Medical History   Past Medical History:   Diagnosis Date     Nephrolithiasis     Other pulmonary embolism without acute cor pulmonale       Active Problem List with Overview Notes    Diagnosis Date Noted    Generalized anxiety disorder with panic attacks 09/24/2024    Current mild episode of major depressive disorder without prior episode 09/24/2024    Social anxiety disorder 09/24/2024    BMI 24.0-24.9, adult 09/24/2024    Acute cystitis with hematuria 07/21/2024    Bilateral pulmonary embolism 07/21/2024    Ureteral stone 07/05/2024       Surgical History   Past Surgical History:   Procedure Laterality Date    CYSTOURETEROSCOPY, WITH HOLMIUM LASER LITHOTRIPSY OF URETERAL CALCULUS AND STENT INSERTION Left 7/5/2024    Procedure: CYSTOURETEROSCOPY, WITH HOLMIUM LASER LITHOTRIPSY OF URETERAL CALCULUS AND STENT INSERTION;  Surgeon: Milana Encinas MD;  Location: [facility];  Service: Urology;  Laterality: Left;    URETEROSCOPIC REMOVAL OF URETERIC CALCULUS N/A 7/5/2024    Procedure: REMOVAL, CALCULUS, URETER, URETEROSCOPIC;  Surgeon: Milana Encinas MD;  Location: [facility];  Service: Urology;  Laterality: N/A;       Objective    Measurement-Based Care (MBC):     Routine Evaluation Instruments   GAD7 Interpretation: 15/21; SEVERE using 5-10-15 cutoffs (scored 15 or more). The GAD7 has acceptable properties for identifying RAMSEY at cutoff scores 7 to 10; a cutoff score of 10 is fairly sensitive (0.8) but not specific (0.4).   PHQ9 Interpretation: 10/27; MILD using 7-15-21 cutoffs, but there tends to be significant variability in sensitivity of cutoff scores between 7 and 15. The PHQ-9 was found to have acceptable diagnostic properties for screening for MDD for cut-off scores between 8 and 11. PHQ-9 is useful for screening, but not always for diagnosis of a current epsiode of MDD in a psychiatric specialty clinic; according to the literature the optimal cutoff score for a current episode of MDD is most reliably 13 or 14.   ANDRE Interpretation: 1/6, SCREENED NEGATIVE   PSS4  "Interpretation: 12/16; HIGH using 6-11 cutoffs. 2 PH, 2 LSE. High perceived helplessness; pt strongly experiences a lack of control over responses to stress. High lack of self-efficacy; pt strongly perceives an inability to handle problems.   Additional Instruments   N/A     Current Evaluation of Mental Status:     Nutritional Screening  "Considering the patient's height and weight, medications, medical history and preferences, should a referral be made to the dietitian?" : NOT at this time   Constitutional       Vitals:    09/24/24 0859   BP: 127/83   Pulse: 77   Weight: 71.8 kg (158 lb 4.6 oz)   Height: 5' 8" (1.727 m)     (Current body mass index is 24.07 kg/m².)    Psychiatric / Mental Status Examination  1. Appearance: Dress is informal but appropriate. Poor eye contact.  2. Discourse: Clear speech with normal rate and volume. Associations intact. Orderly.  3. Emotional Expression: Mood is anxious and somewhat depressed. Affect is congruent with mood.  4. Perception and Thinking: No hallucinations. No suicidality, no homicidality, delusions, or paranoia.  5. Sensorium: Grossly intact. Able to focus for interview.  6. Memory and Fund of Knowledge: Intact for content of interview.  7. Insight and Judgment: Intact.    Neurological / Musculoskeletal / Osteopathic Structural  Gait, Station:  Non-ataxic gait.     Auto-populated Objective Chart Data:     The chart was reviewed for recent diagnostic procedures and investigations, and pertinent results are noted below.   Encounter Medications  Outpatient Encounter Medications as of 9/24/2024   Medication Sig Dispense Refill    apixaban (ELIQUIS) 5 mg Tab Take 1 tablet (5 mg total) by mouth 2 (two) times daily. Take 10mg bid x7 days, then 5mg bid 180 tablet 1    [DISCONTINUED] sertraline (ZOLOFT) 50 MG tablet Take 1 tablet (50 mg total) by mouth once daily. 30 tablet 11    hydrOXYzine HCL (ATARAX) 10 MG Tab Take 1-2 tablets (10-20 mg total) by mouth 3 (three) times daily " as needed (anxiety or insomnia). 90 tablet 2    pantoprazole (PROTONIX) 40 MG tablet Take 1 tablet (40 mg total) by mouth once daily. (Patient not taking: Reported on 9/13/2024) 90 tablet 1    sertraline (ZOLOFT) 50 MG tablet Take 1 tablet (50 mg total) by mouth once daily. 30 tablet 1    [DISCONTINUED] ketorolac (TORADOL) 10 mg tablet Take 10 mg by mouth every 6 (six) hours. (Patient not taking: Reported on 9/3/2024)      [DISCONTINUED] ondansetron (ZOFRAN-ODT) 4 MG TbDL Take 1 tablet (4 mg total) by mouth every 8 (eight) hours as needed (nausea/vomiting). (Patient not taking: Reported on 9/3/2024) 15 tablet 0    [DISCONTINUED] tamsulosin (FLOMAX) 0.4 mg Cap Take 1 capsule (0.4 mg total) by mouth once daily. (Patient not taking: Reported on 9/3/2024) 30 capsule 0    [DISCONTINUED] traMADoL (ULTRAM) 50 mg tablet Take 1 tablet (50 mg total) by mouth every 6 (six) hours as needed for Pain. (Patient not taking: Reported on 9/3/2024) 20 tablet 0     No facility-administered encounter medications on file as of 9/24/2024.      Cardiometabolic  EKG Results  Results for orders placed or performed during the hospital encounter of 07/22/24   EKG 12-lead    Collection Time: 07/22/24  7:25 PM   Result Value Ref Range    QRS Duration 86 ms    [facility] QTC Calculation 414 ms    Narrative    Test Reason : R07.9,    Vent. Rate : 085 BPM     Atrial Rate : 085 BPM     P-R Int : 136 ms          QRS Dur : 086 ms      QT Int : 348 ms       P-R-T Axes : 027 050 019 degrees     QTc Int : 414 ms    Normal sinus rhythm  Normal ECG  When compared with ECG of 21-JUL-2024 03:41,  No significant change was found  Confirmed by Carmelina Ceron MD ([id number]) on 7/23/2024 4:51:45 PM    Referred By: [email or web address]   SELF           Confirmed By:Carmelina Ceron MD        Labs  Lab Results   Component Value Date    TSH 2.71 05/16/2024     07/23/2024    HGBA1C 5.6 05/16/2024    CHOL 228 (H) 05/16/2024    TRIG 152 (H) 05/16/2024    HDL 35 (L) 05/16/2024  "   LDLCALC 163 (H) 05/16/2024       BMI Trend - (Current body mass index is 24.07 kg/m².)  Wt Readings from Last 5 Encounters:   09/24/24 71.8 kg (158 lb 4.6 oz)   09/13/24 71.9 kg (158 lb 8.2 oz)   09/03/24 71.7 kg (158 lb 1.6 oz)   08/28/24 72.4 kg (159 lb 9.8 oz)   08/12/24 70.8 kg (156 lb)       BP/HR Trend   BP Readings from Last 3 Encounters:   09/24/24 127/83   09/13/24 117/76   09/03/24 104/82      Pulse Readings from Last 3 Encounters:   09/24/24 77   09/13/24 80   09/03/24 73       Endocrine Lab Results   Component Value Date    TSH 2.71 05/16/2024      Hematologic   Lab Results   Component Value Date    WBC 4.92 08/12/2024    RBC 5.24 08/12/2024    HGB 15.4 08/12/2024    HCT 49.0 08/12/2024    MCV 94 08/12/2024    MCH 29.4 08/12/2024    RDW 12.4 08/12/2024     08/12/2024    MPV 10.0 08/12/2024    GRAN 3.4 08/12/2024    GRAN 68.8 08/12/2024      Hepatorenal Lab Results   Component Value Date     07/23/2024    K 4.0 07/23/2024    CALCIUM 8.8 07/23/2024    PHOS 3.7 07/22/2024    MG 2.3 07/22/2024    CO2 19 (L) 07/23/2024    ANIONGAP 13 07/23/2024    CREATININE 1.0 07/23/2024    BUN 14 07/23/2024    EGFRNORACEVR >60.0 07/23/2024    SPECGRAV 1.020 07/21/2024    PROTEINUA 2+ (A) 07/21/2024    AST 15 07/23/2024    ALT 15 07/23/2024    BILITOT 0.5 07/23/2024    LABPROT 11.1 07/22/2024    ALBUMIN 4.2 07/23/2024    INR 1.0 07/22/2024      Medication Level No results found for: "LITHIUM", "VALPROATE", "CBMZ", "CARBAMAZ", "LAMOTRIGINE", "PHENYTOIN", "PHENOBARB", "CLOZAPINE", "NORCLOZAP", "CLOZNORCLOZ", "CLONAZEPAM", "MERCY", "VENLAFAXINE", "NORTRIP", "OXCARBAZ"   Other Labs Lab Results   Component Value Date    HEPCAB NON-REACTIVE 05/16/2024      Drug Screening   AMP * (*) METHAMP * (*)   CHANCE * (*) MORPH * (*)   BUP * (*) OXY * (*)   BENZO * (*) METHADONE * (*)   BULMARO * (*) THC * (*)   HX No results found for: "AMPHETAMINES", "PCDSOPHENCYN", "COCAINEMETAB", "POCCOC", "COCAINE", "BULAMRO", "COCAINEURINE", " ""POCCOCDRUG", "PCDSUTRAMA", "PCDSOBENZOD", "BARBITURATES", "PCDSCOMETHA", "OPIATESCREEN", "PCDSUBUPRE", "PCDSUFENTANY", "PCDSUOXYCOD", "BUPRENORPH", "NORBUPRENOR", "MARIJUANATHC"  No results found for: "PCDSOALCOHOL", "ALCOHOLMEDIC", "THEYLGLUCU", "ETHYLSULF", "[id number]", "PETH"         Billing Documentation:     Method of Encounter IN PERSON visit at the clinic   Type of Encounter New patient to me, NOT seen by department within last 3 years   Counseling;  Psychotherapy Not applicable: Not done or UNDER 16 minutes   Counseling;  Tobacco and/or Nicotine Not applicable: Not done or UNDER 3 minutes   Additional Codes and Modifiers N/A   Time Remaining Chart/Pt 17475: NEW patient to me and DID prescribe meds (and two or fewer 36726/86321 codes within a year), 45+mins   Total Mins  (9/24/2024) NOT billing for time. Evaluations are 60 mins face-to-face with patient AND 45 mins non face-to-face time (chart review, documentation, etc.).        Mumtaz Stein, DO  Department of Psychiatry, [facility]        "

## 2024-09-24 NOTE — PATIENT INSTRUCTIONS
Continue ZOLOFT 50mg daily  Start ATARAX/VISTARIL 10-20mg as needed up to three times per day for anxiety or insomnia     Please keep therapy appointments

## 2024-09-29 NOTE — PROGRESS NOTES
Patient ID:  Yovani Melvin  39 y.o.  male      Assessment:       1. Encounter for screening for cardiovascular disorders    2. Bilateral pulmonary embolism          Plan:   Symptomatically doing better.  Reports resolution of the symptoms  CTA on July 21, 2024 showed small tertiary bilateral PE however repeat CTA 2 days later did not show any evidence of PE.  This is unusual.  The 2nd CTA could be false negative.  He did have convincing symptoms, tachycardia and had elevated D-dimer on initial presentation.  Lower extremity Doppler did not show any evidence of DVT on July 24. He would like to be safe and continue Eliquis which I agree with.  He also has appointment coming up with heme Onc.  Discuss further with them.  Continue Eliquis at this time.  He had echocardiogram done during his hospitalization which was normal with no evidence of RV strain.  Zio patch did not show any significant arrhythmia.  Underlying rhythm was sinus.  He did report palpitations and heart racing while wearing the monitor.  Corresponding rhythm was normal sinus rhythm and 1 time was sinus tachycardia with a rate of 142 beats per minute.  Since then, he has not had recurrence of these symptoms.  Follow up with PCP and heme Onc.    Subjective:     Chief Complaint   Patient presents with    Hospital Follow Up    pulmonary embolism    Results     zio       HPI:  Yovani Melvin is a 39 y.o. male who presents today for hospital follow-up. He was at [facility] in July for bilateral pulmonary embolism after presenting with sharp chest and shortness of breath.  Thought to be provoked.  He had left ureteral stent placement earlier in July.  He stayed in bed mostly for a few days after the stent placement.  In the ED at sinus tachycardia.  Because of that a Zio was ordered.  It did not show any significant arrhythmia.  Underlying rhythm was sinus.  He did report palpitations and heart racing while wearing the monitor.  Corresponding rhythm was normal  sinus rhythm and 1 time was sinus tachycardia with a rate of 142 beats per minute.  Since then, he has not had recurrence of these symptoms.  Has shortness of breath has resolved.  Denies any chest pain.  Denies any orthopnea, PND, swelling of extremities, syncope or near-syncope.  He had echocardiogram done during his hospitalization which was normal with no evidence of RV strain.    PREVIOUS CARDIAC TESTING/PROCEDURES HISTORY:  Most Recent Echocardiogram Results  Results for orders placed during the hospital encounter of 07/21/24    Echo    Interpretation Summary    Left Ventricle: The left ventricle is normal in size. Normal wall thickness. There is normal systolic function with a visually estimated ejection fraction of 55 - 60%. There is normal diastolic function.    Right Ventricle: Normal right ventricular cavity size. Wall thickness is normal. Systolic function is normal.    Tricuspid Valve: There is mild regurgitation. The estimated PA systolic pressure is at least 23 mmHg.    IVC/SVC: Normal venous pressure at 3 mmHg.        Other Most Recent Cardiology Results  Results for orders placed during the hospital encounter of 08/02/24    Cardiac Monitor - 3-15 Day Adult (Cupid Only)    Interpretation Summary    Predominant underlying rhythm was Sinus Rhythm.    Patient had a min HR of 42 bpm, max HR of 169 bpm, and avg HR of 72 bpm.    Isolated SVEs were rare (<1.0%, 38), and no SVE Couplets or SVE Triplets were present.    Isolated VEs were rare (<1.0%, 1), and no VE Couplets or VE Triplets were present.    The patient complained of 7 episodes. The symptom(s) included chest pain, palpitations, shortness of breath and heart racing. The corresponding rhythm to the patient reported event was normal sinus rhythm with a normal CA interval with rates of 72-96 bpm and sinus tachycardia at rate of 141 bpm with SVE's.    There were 7 auto-triggered events corresponding with normal sinus rhythm at rates of 70-86 bpm. and  sinus tachycardia rhythm with rates of 113-136 bpm. The symptom(s) included PACs.    See full report    BENIGN MONITOR EPISODES OF SINUS TACHYCARDIA      CARDIAC SURGERY:    Most Recent EKG Results  Results for orders placed or performed during the hospital encounter of 07/22/24   EKG 12-lead    Collection Time: 07/22/24  7:25 PM   Result Value Ref Range    QRS Duration 86 ms    [facility] QTC Calculation 414 ms    Narrative    Test Reason : R07.9,    Vent. Rate : 085 BPM     Atrial Rate : 085 BPM     P-R Int : 136 ms          QRS Dur : 086 ms      QT Int : 348 ms       P-R-T Axes : 027 050 019 degrees     QTc Int : 414 ms    Normal sinus rhythm  Normal ECG  When compared with ECG of 21-JUL-2024 03:41,  No significant change was found  Confirmed by Jie KIRKPATRICK, Carmelina Myrick ([id number]) on 7/23/2024 4:51:45 PM    Referred By: [email or web address]   SELF           Confirmed By:Carmelina Ceron MD       The ASCVD Risk score (Dread SCHNEIDER, et al., 2019) failed to calculate for the following reasons:    The 2019 ASCVD risk score is only valid for ages 40 to 79    Review of patient's allergies indicates:   Allergen Reactions    Aspirin Palpitations and Shortness Of Breath    Hydrocodone     Penicillins        Past Medical History:   Diagnosis Date    Nephrolithiasis     Other pulmonary embolism without acute cor pulmonale      Past Surgical History:   Procedure Laterality Date    CYSTOURETEROSCOPY, WITH HOLMIUM LASER LITHOTRIPSY OF URETERAL CALCULUS AND STENT INSERTION Left 7/5/2024    Procedure: CYSTOURETEROSCOPY, WITH HOLMIUM LASER LITHOTRIPSY OF URETERAL CALCULUS AND STENT INSERTION;  Surgeon: Milana Encinas MD;  Location: [facility];  Service: Urology;  Laterality: Left;    URETEROSCOPIC REMOVAL OF URETERIC CALCULUS N/A 7/5/2024    Procedure: REMOVAL, CALCULUS, URETER, URETEROSCOPIC;  Surgeon: Milana Encinas MD;  Location: [facility] OR;  Service: Urology;  Laterality: N/A;     Social History     Tobacco Use    Smoking status: Never     "Smokeless tobacco: Never   Substance Use Topics    Alcohol use: Never    Drug use: Never          REVIEW OF SYSTEMS  CONSTITUTIONAL: No chills, no fatigue, no fever.   EYES: No double vision, no blurred vision  NEURO: No headaches, no dizziness  RESPIRATORY: No cough, no wheezing.    CARDIOVASCULAR: See HPI   GI: No abdominal pain, no melena, no diarrhea, no nausea or vomiting.   : No  dysuria and frequency, no hematuria  SKIN: no bruising, no discoloration  ENDOCRINE: no polyphagia, no heat intolerance, no cold intolerance  PSYCHIATRIC: no depression, no anxiety, no memory loss  MUSCULOSKELETAL: no  neck pain, no muscle weakness,no back pain          Objective:        Vitals:    08/28/24 1404   BP: 122/62   Pulse: 88       PHYSICAL EXAM  CONSTITUTIONAL: In no apparent distress  HEENT: Normocephalic. Pupils normal and conjunctivae normal. No pallor  NECK: No JVD  LUNGS: B/L air entry to the lungs, clear to auscultation. No rales, wheezing or rhonchi.  HEART: Normal rate and regular rhythm. Normal S1 and S2.  No murmur   ABDOMEN: soft, non-tender; bowel sounds normal  EXTREMITIES: No edema. Good palpable distal pulses.  NEURO: AAO X 3, no gross sensory or motor deficits  SKIN:  No rash  Psych:  Normal affect    Lab Results   Component Value Date    WBC 4.92 08/12/2024    HGB 15.4 08/12/2024    HCT 49.0 08/12/2024     08/12/2024    CHOL 228 (H) 05/16/2024    TRIG 152 (H) 05/16/2024    HDL 35 (L) 05/16/2024    ALT 15 07/23/2024    AST 15 07/23/2024     07/23/2024    K 4.0 07/23/2024     07/23/2024    CREATININE 1.0 07/23/2024    BUN 14 07/23/2024    CO2 19 (L) 07/23/2024    TSH 2.71 05/16/2024    INR 1.0 07/22/2024    HGBA1C 5.6 05/16/2024        @  Lab Results   Component Value Date    CHOL 228 (H) 05/16/2024     Lab Results   Component Value Date    HDL 35 (L) 05/16/2024     Lab Results   Component Value Date    LDLCALC 163 (H) 05/16/2024     No results found for: "DLDL"  Lab Results   Component " Value Date    TRIG 152 (H) 05/16/2024       f1   Lab Results   Component Value Date    CHOLHDL 6.5 (H) 05/16/2024            Current Outpatient Medications   Medication Instructions    apixaban (ELIQUIS) 5 mg, Oral, 2 times daily, Take 10mg bid x7 days, then 5mg bid    hydrOXYzine HCL (ATARAX) 10-20 mg, Oral, 3 times daily PRN    pantoprazole (PROTONIX) 40 mg, Oral, Daily    sertraline (ZOLOFT) 50 mg, Oral, Daily                   All pertinent labs, imaging, and EKGs reviewed.  Patient's most recent EKG tracing was personally interpreted by this provider.    Problem List Items Addressed This Visit          Hematology    Bilateral pulmonary embolism     Other Visit Diagnoses       Encounter for screening for cardiovascular disorders    -  Primary            Follow up if symptoms worsen or fail to improve.

## 2024-10-21 PROBLEM — I26.99 BILATERAL PULMONARY EMBOLISM: Status: RESOLVED | Noted: 2024-07-21 | Resolved: 2024-10-21

## 2024-10-23 ENCOUNTER — TELEPHONE (OUTPATIENT)
Dept: PSYCHIATRY | Facility: CLINIC | Age: 39
End: 2024-10-23
Payer: COMMERCIAL

## 2024-10-23 NOTE — TELEPHONE ENCOUNTER
Returned patient call to reschedule appt for tomorrow due to provider being in [employer]. Offered next available slot on 10/30 @ 930am and patient accepted. No further questions or concerns at this time.

## 2024-10-23 NOTE — TELEPHONE ENCOUNTER
Rec'd VM at 12:17. Pt states he rec'd a text yesterday stating that Dr. Stein would be out of the office on 10/24/24 and he would be contacted to reschedule appt.

## 2024-10-30 ENCOUNTER — OFFICE VISIT (OUTPATIENT)
Dept: PSYCHIATRY | Facility: CLINIC | Age: 39
End: 2024-10-30
Payer: COMMERCIAL

## 2024-10-30 VITALS
HEIGHT: 68 IN | SYSTOLIC BLOOD PRESSURE: 138 MMHG | WEIGHT: 162.06 LBS | DIASTOLIC BLOOD PRESSURE: 91 MMHG | HEART RATE: 68 BPM | BODY MASS INDEX: 24.56 KG/M2

## 2024-10-30 DIAGNOSIS — F41.0 GENERALIZED ANXIETY DISORDER WITH PANIC ATTACKS: ICD-10-CM

## 2024-10-30 DIAGNOSIS — F40.10 SOCIAL ANXIETY DISORDER: ICD-10-CM

## 2024-10-30 DIAGNOSIS — F41.1 GENERALIZED ANXIETY DISORDER WITH PANIC ATTACKS: ICD-10-CM

## 2024-10-30 DIAGNOSIS — Z13.41 ENCOUNTER FOR AUTISM SCREENING: ICD-10-CM

## 2024-10-30 DIAGNOSIS — F32.4 MAJOR DEPRESSIVE DISORDER WITH SINGLE EPISODE, IN PARTIAL REMISSION: Primary | ICD-10-CM

## 2024-10-30 PROCEDURE — 99999 PR PBB SHADOW E&M-EST. PATIENT-LVL III: CPT | Mod: PBBFAC,,, | Performed by: STUDENT IN AN ORGANIZED HEALTH CARE EDUCATION/TRAINING PROGRAM

## 2024-10-30 RX ORDER — SERTRALINE HYDROCHLORIDE 25 MG/1
75 TABLET, FILM COATED ORAL DAILY
Qty: 90 TABLET | Refills: 1 | Status: SHIPPED | OUTPATIENT
Start: 2024-10-30 | End: 2024-12-29

## 2024-12-04 ENCOUNTER — TELEPHONE (OUTPATIENT)
Dept: PSYCHIATRY | Facility: CLINIC | Age: 39
End: 2024-12-04
Payer: COMMERCIAL

## 2024-12-11 ENCOUNTER — OFFICE VISIT (OUTPATIENT)
Dept: PSYCHIATRY | Facility: CLINIC | Age: 39
End: 2024-12-11
Payer: COMMERCIAL

## 2024-12-11 VITALS
BODY MASS INDEX: 24.76 KG/M2 | DIASTOLIC BLOOD PRESSURE: 90 MMHG | HEART RATE: 71 BPM | WEIGHT: 163.38 LBS | HEIGHT: 68 IN | SYSTOLIC BLOOD PRESSURE: 129 MMHG

## 2024-12-11 DIAGNOSIS — F41.1 GENERALIZED ANXIETY DISORDER WITH PANIC ATTACKS: ICD-10-CM

## 2024-12-11 DIAGNOSIS — F41.0 GENERALIZED ANXIETY DISORDER WITH PANIC ATTACKS: ICD-10-CM

## 2024-12-11 DIAGNOSIS — F32.4 MAJOR DEPRESSIVE DISORDER WITH SINGLE EPISODE, IN PARTIAL REMISSION: Primary | ICD-10-CM

## 2024-12-11 DIAGNOSIS — F40.10 SOCIAL ANXIETY DISORDER: ICD-10-CM

## 2024-12-11 DIAGNOSIS — Z13.39 ADHD (ATTENTION DEFICIT HYPERACTIVITY DISORDER) EVALUATION: ICD-10-CM

## 2024-12-11 PROCEDURE — 99999 PR PBB SHADOW E&M-EST. PATIENT-LVL III: CPT | Mod: PBBFAC,,, | Performed by: STUDENT IN AN ORGANIZED HEALTH CARE EDUCATION/TRAINING PROGRAM

## 2024-12-11 PROCEDURE — G2211 COMPLEX E/M VISIT ADD ON: HCPCS | Mod: S$GLB,,, | Performed by: STUDENT IN AN ORGANIZED HEALTH CARE EDUCATION/TRAINING PROGRAM

## 2024-12-11 PROCEDURE — 3044F HG A1C LEVEL LT 7.0%: CPT | Mod: CPTII,S$GLB,, | Performed by: STUDENT IN AN ORGANIZED HEALTH CARE EDUCATION/TRAINING PROGRAM

## 2024-12-11 PROCEDURE — 99214 OFFICE O/P EST MOD 30 MIN: CPT | Mod: S$GLB,,, | Performed by: STUDENT IN AN ORGANIZED HEALTH CARE EDUCATION/TRAINING PROGRAM

## 2024-12-11 PROCEDURE — 3080F DIAST BP >= 90 MM HG: CPT | Mod: CPTII,S$GLB,, | Performed by: STUDENT IN AN ORGANIZED HEALTH CARE EDUCATION/TRAINING PROGRAM

## 2024-12-11 PROCEDURE — 1160F RVW MEDS BY RX/DR IN RCRD: CPT | Mod: CPTII,S$GLB,, | Performed by: STUDENT IN AN ORGANIZED HEALTH CARE EDUCATION/TRAINING PROGRAM

## 2024-12-11 PROCEDURE — 3008F BODY MASS INDEX DOCD: CPT | Mod: CPTII,S$GLB,, | Performed by: STUDENT IN AN ORGANIZED HEALTH CARE EDUCATION/TRAINING PROGRAM

## 2024-12-11 PROCEDURE — 96136 PSYCL/NRPSYC TST PHY/QHP 1ST: CPT | Mod: 59,S$GLB,, | Performed by: STUDENT IN AN ORGANIZED HEALTH CARE EDUCATION/TRAINING PROGRAM

## 2024-12-11 PROCEDURE — 1159F MED LIST DOCD IN RCRD: CPT | Mod: CPTII,S$GLB,, | Performed by: STUDENT IN AN ORGANIZED HEALTH CARE EDUCATION/TRAINING PROGRAM

## 2024-12-11 PROCEDURE — 3074F SYST BP LT 130 MM HG: CPT | Mod: CPTII,S$GLB,, | Performed by: STUDENT IN AN ORGANIZED HEALTH CARE EDUCATION/TRAINING PROGRAM

## 2024-12-11 RX ORDER — SERTRALINE HYDROCHLORIDE 100 MG/1
100 TABLET, FILM COATED ORAL DAILY
Qty: 30 TABLET | Refills: 1 | Status: SHIPPED | OUTPATIENT
Start: 2024-12-11 | End: 2025-02-09

## 2024-12-11 RX ORDER — PROPRANOLOL HYDROCHLORIDE 10 MG/1
10 TABLET ORAL 2 TIMES DAILY
Qty: 60 TABLET | Refills: 1 | Status: SHIPPED | OUTPATIENT
Start: 2024-12-11 | End: 2025-02-09

## 2024-12-11 NOTE — PATIENT INSTRUCTIONS
Increase ZOLOFT to 100mg daily  Replace ATARAX/VISTARIL with PROPRANOLOL 10mg twice per day  Please keep therapy appointments  Referral placed for ADHD testing

## 2024-12-11 NOTE — PROGRESS NOTES
Outpatient Psychiatry Followup Visit  12/11/2024  Assessment & Plan    Impression     ICD-10-CM ICD-9-CM   1. Major depressive disorder with single episode, in partial remission  F32.4 296.25   2. Social anxiety disorder  F40.10 300.23   3. Generalized anxiety disorder with panic attacks  F41.1 300.02    F41.0 300.01   4. ADHD (attention deficit hyperactivity disorder) evaluation  Z13.39 V79.8   5. BMI 24.0-24.9, adult  Z68.24 V85.1      Plan of Care & Medication Management    Chart was reviewed. The risks and benefits of medication were discussed with pt. The treatment plan and followup plan were reviewed with pt. Pt understands to contact clinic if symptoms worsen. Pt understands to call 911 or go to nearest ER for suicidal ideation, intent or plan. Unless otherwise specified, pt did NOT display signs of nor endorse symptoms of overt psychosis or acute mood disorder requiring hospitalization during the encounter; pt denied violent thoughts or suicidal or homicidal ideation, intent, or plan.   RX History ATARAX/VISTARIL (tachycardia), BUSPAR (panic attacks while taking), ZOLOFT   Current RX Discontinue ATARAX/VISTARIL  Pt was provided [facility] educational material 9/24/2024  Adjustments:  12/11/2024: Reports stopped taking due to tachycardia  9/24/2024: Start 10-20mg TID prn anxiety/insomnia  Prior to 9/24/2024, pt had been taking ZOLOFT 50mg for 1 week  Start PROPRANOLOL  Pt was provided [employer] educational material 12/11/2024  Adjustments:  12/11/2024: Start 10mg BID  Increase ZOLOFT  Adjustments:  12/11/2024: Increase to 100mg daily  10/30/2024: Increase to 75mg daily  9/24/2024: Continue ZOLOFT 50mg daily  Prior to 9/24/2024, pt had been taking ZOLOFT 50mg for 1 week      Education, Counseling & Monitoring []SLEEP HYGIENE  []THERAPY  []CONTRACT 12/11/2024?  [] REVIEWED 12/11/2024?  []NRT  []   Other Orders Referral to ADHD testing  PENDING from past encounter: autism testing   RETURN M: STANDARD PROTOCOL: RETURN  "IN 4 WEEKS (ONE MONTH)       Subjective    Available documentation has been reviewed, and pertinent elements of the chart have been incorporated into this note where appropriate. Last Epic encounter with writer was on 10/30/2024   Yovani Melvin, a 39 y.o. male, presenting for followup visit. This visit was done in person, IN CLINIC.      New jobs as   Going well, likes animals and learning  Doesn't dread going to work    Anxiety persists  Denies depressed mood  "ZOLOFT definitely helped with the depression"    Tolerating ZOLOFT  NO sexual side effects  NO diarrhea  NO adverse effects    Stopped taking ATARAX/VISTARIL due to heart racing    BP elevated    Keeping therapy appointments with external therapist    Autism testing pending    Discussed referral for ADHD testing  Discussed increasing ZOLOFT  Discussed adding PROPRANOLOL for anxiety    Will continue treatment otherwise as planned        Objective    Mental Status and Physical Exam  1. Appearance: Dress is informal but appropriate. Motor activity normal.  2. Discourse: Clear speech with normal rate and volume. Associations intact. Orderly.  3. Emotional Expression: Anxious. Affect is appropriate.  4. Perception and Thinking: No hallucinations. No suicidality, no homicidality, delusions, or paranoia.  5. Sensorium: Grossly intact. Able to focus for interview.  6. Memory and Fund of Knowledge: Intact for content of interview.  7. Insight and Judgment: Intact.    Constitutional / General  Vitals:    12/11/24 0923   BP: (!) 129/90   Pulse: 71   Weight: 74.1 kg (163 lb 5.8 oz)   Height: 5' 8" (1.727 m)     (Current body mass index is 24.84 kg/m².)         Measurement-Based Care (MBC):     Routine Instruments   PROMIS-ANXIETY Interpretation: 6 (4a raw score): T-SCORE 51.2; NONE TO SLIGHT using 55-60-70 cutoffs.   PROMIS-DEPRESSION Interpretation: 4 (4a raw score): T-SCORE 41.0; NONE TO SLIGHT using 55-60-70 cutoffs.   PSS4 Interpretation: 07/16; MODERATE using " "6-11 cutoffs. 0 PH, 0 LSE.   Additional Instruments   MBC ANCHOR : much better         Auto-populated chart data omitted from this note for brevity.      Billing Documentation:     Method of Encounter IN PERSON visit at the clinic, established   E/M Code 74061: FOLLOW UP VISIT, Rx mgmt, "Multiple STABLE chronic illnesses"   Additional Codes and Modifiers     22406, with modifer 59: administered and scored more than one psychological or neuropsychological tests (see MBC above) (16+ mins)  , without modifiers -24,-25,-53: COMPLEXITY: Visit today included increased complexity associated with the care of the episodic problem(s) (multiple psychiatric disorders - see above) addressed and managing the longitudinal care of the patient due to the serious and/or complex managed problem(s) (multiple psychiatric disorders - see above).   Time N/A - Not billing for time        Mumtaz Stein DO  Department of Psychiatry, [facility]        "

## 2025-01-09 ENCOUNTER — PATIENT MESSAGE (OUTPATIENT)
Dept: PSYCHIATRY | Facility: CLINIC | Age: 40
End: 2025-01-09
Payer: COMMERCIAL

## 2025-01-21 ENCOUNTER — TELEPHONE (OUTPATIENT)
Dept: PSYCHIATRY | Facility: CLINIC | Age: 40
End: 2025-01-21
Payer: COMMERCIAL

## 2025-01-22 ENCOUNTER — OFFICE VISIT (OUTPATIENT)
Dept: PSYCHIATRY | Facility: CLINIC | Age: 40
End: 2025-01-22
Payer: COMMERCIAL

## 2025-01-22 DIAGNOSIS — F90.0 ADHD (ATTENTION DEFICIT HYPERACTIVITY DISORDER), INATTENTIVE TYPE: Primary | ICD-10-CM

## 2025-01-22 DIAGNOSIS — Z13.39 ADHD (ATTENTION DEFICIT HYPERACTIVITY DISORDER) EVALUATION: ICD-10-CM

## 2025-01-22 PROCEDURE — 96138 PSYCL/NRPSYC TECH 1ST: CPT | Mod: NDTC,,, | Performed by: PSYCHOLOGIST

## 2025-01-22 PROCEDURE — 99499 UNLISTED E&M SERVICE: CPT | Mod: 95,,, | Performed by: PSYCHOLOGIST

## 2025-01-22 PROCEDURE — 96131 PSYCL TST EVAL PHYS/QHP EA: CPT | Mod: 95,,, | Performed by: PSYCHOLOGIST

## 2025-01-22 PROCEDURE — 96139 PSYCL/NRPSYC TST TECH EA: CPT | Mod: NDTC,,, | Performed by: PSYCHOLOGIST

## 2025-01-22 PROCEDURE — 96130 PSYCL TST EVAL PHYS/QHP 1ST: CPT | Mod: 95,,, | Performed by: PSYCHOLOGIST

## 2025-01-22 NOTE — PROGRESS NOTES
The patient location is: [place], [place]  The chief complaint leading to consultation is: ADHD evaluation  Visit type: Virtual visit with synchronous audio and video  Each patient to whom he or she provides medical services by telemedicine is:  (1) informed of the relationship between the physician and patient and the respective role of any other health care provider with respect to management of the patient; and (2) notified that he or she may decline to receive medical services by telemedicine and may withdraw from such care at any time.  Face-to-Face time: 31 minutes    Notes:      Outpatient Psychological Consultation    Name: Yovani Melvin  MRN: [id number]  : 1985    Testing appointment: 01/15/2025    ID:  Patient presents for consultation for diagnostic clarity in regard to difficulties with attention/concentration    Reason for encounter Referral from Mumtaz Stein DO    Psychiatric records were reviewed prior to the diagnostic interview. Comprehensive psychological assessment will not be documented in this report rather will be focused on the referral question. See prior notes for comprehensive psychiatric work-up.    Chief Complaint: Pt is a 39 year old male presenting as a referral from Mumtaz Stein DO for diagnostic clarification due to report of difficulty concentration/poor attention    Psychological Assessments Administered  Wender [place] Rating Scale for the Attention Deficit Hyperactivity Disorder  Leroy Adult ADHD Rating Scales-IV: Other-Report, current symptoms  [place]oy Adult ADHD Rating Scales-IV: Other-Report, childhood symptoms  Jeff Continuous Performance Test 3  The Dot Counting Test    Results    Wender [place] Rating Scale for the Attention Deficit Hyperactivity Disorder  The Wender [place] Rating Scale can be used to assess adults for Attention Deficit Hyperactivity Disorder with a subset of 25 questions associated with that diagnosis. It is a retrospective  diagnosis of childhood ADHD.      Wender [place] Rating Scale Subscore = 56 (sum of the 25 questions endorsed that are associated with ADHD)    Scores vary from 1-100, and the cutoff score is 46.    Given pt's report of their own symptoms of ADHD in childhood, their response style does support a diagnosis of ADHD.    L[place]oy Adult ADHD Rating Scales-IV: Other-Report, current symptoms  The BAARS-IV: Other-Report, current symptoms is a collateral measure of the patient's current symptoms of ADHD, as noted by someone with knowledge of the patient's executive functioning.    Estela Melvin is the evaluator whose relationship to pt is his spouse.     Inattention total score: 18      Hyperactivity total score: 15      Impulsivity total score: 8      Sluggish Cognitive Tempo total score: 24    ADHD total score (sum of Inattention/Hyperactivity/Impulsivity Subsections): 41       Inattention Symptom Count: 1      Hyperactivity/Impulsivity Symptom Count: 4   ADHD Symptom Count total (sum of Inattention/Hyperactivity/Impulsivity Subsections): 5     Based on the evaluator's report of pt's symptoms, pt only struggles with 1 symptom of inattention but 4 symptoms of hyperactivity/impulsivity. This does not support a diagnosis of ADHD.    L[place]oy Adult ADHD Rating Scales-IV: Other-Report, childhood symptoms  The BAARS-IV: Other-Report, childhood symptoms is a collateral measure of the patient's symptoms of ADHD between the ages of 5-12 years old, as reported by someone with knowledge of the patient's symptoms during childhood.    Kulwant Gracia is the evaluator whose relationship to pt is her sister.     Inattention total score: 23   Hyperactivity/Impulsivity total score: 19     Inattention Symptom Count: 5   Hyperactivity/Impulsivity Symptom Count: 3     Based on the evaluator's report of symptoms, pt did exhibit sufficient symptoms of inattention, hyperactivity, and impulsivity that affected multiple settings at an early age to meet  the DSM-5 diagnosis criteria for an ADHD diagnosis. Including here as was completed but I do not accept sibling reports for childhood symptoms.    Jeff Continuous Performance Test 3  The Jeff Continuous Performance Test 3rd Edition (Jeff CPT 3) is an objective, task-oriented computerized assessment of attention-related problems. This measure creates an index of the respondent's performance in areas of inattentiveness, impulsivity, sustained attention, and vigilance.    Pt's performance on this objective measure does not indicate difficulties with sustained attention, hyperactivity, impulsivity, and difficulty maintaining vigilance with varying levels of stimulus frequency.    The Dot Counting Test  The Dot Counting Test (DCT) is a brief task that assesses test-taking effort in individuals.     Based on patient performance and score, pt appeared to demonstrate good effort.    Interpretation    Pt is a 39 year old male presenting as a referral from Mumtaz Stein DO for diagnostic clarification due to report of difficulty concentration/poor attention. Pt reports attention/concentration concerns consistent with ADHD, predominantly inattentive type. Pt explained that his symptoms were present before the age of 14 and cause impairment in more than one setting.      Diagnosis     Attention-Deficit / Hyperactivity Disorder, predominately inattentive    Plan and Recommendations    Return for further psychiatric medication management with Dr. Stein    Attention Tips Remember that inattention and lack of focus are major culprits to forgetting information so be sure and practice paying attention for adequate learning of information. If you rely on passive attention to remembering something (e.g., sheldon, leilani-huh approach), you'll find you cannot recall it later. I recommend the following to improve attention, which may aid in later recall:   Reduce distractions as much as possible.  Look at the person as they are  speaking to you.   Paraphrase as they are speaking  Write down important pieces of information   Ask people to repeat if you zone out.    Have visual cues  (posted to-do-list, daily schedule) to remind you if you need to do something later.   Processing Speed Tips Using multiple modalities (e.g., listening, writing notes, asking questions, recording) to learn new information is likely to allow additional time for processing, thus improving memory for the material.   Allowing sufficient time to complete tasks will reduce frustration and help to ensure completion.  Spend a lot of up-front time planning in advance how long a task may take and then chunk steps in the task so you don't wear yourself out.   Executive Functioning Tips: Don't attempt to multi-task.  Separate tasks so that each can be completed one at a time.  Consider using a calendar/day planner, as that may be effective to help you plan and stay on track.  Color-coding specific tasks by importance may add additional benefit to your planner.  Break down large projects into smaller tasks and write down the steps to completing the task.       BILLIN, [id number] X2 (90 minutes of testing and scoring with psychometrist), [id number], [id number] (2 hours of report writing, evaluation and feedback)

## 2025-01-29 ENCOUNTER — OFFICE VISIT (OUTPATIENT)
Dept: PSYCHIATRY | Facility: CLINIC | Age: 40
End: 2025-01-29
Payer: COMMERCIAL

## 2025-01-29 VITALS
SYSTOLIC BLOOD PRESSURE: 122 MMHG | DIASTOLIC BLOOD PRESSURE: 83 MMHG | HEART RATE: 70 BPM | HEIGHT: 68 IN | BODY MASS INDEX: 24.96 KG/M2 | WEIGHT: 164.69 LBS

## 2025-01-29 DIAGNOSIS — F40.10 SOCIAL ANXIETY DISORDER: ICD-10-CM

## 2025-01-29 DIAGNOSIS — F41.0 GENERALIZED ANXIETY DISORDER WITH PANIC ATTACKS: ICD-10-CM

## 2025-01-29 DIAGNOSIS — F41.1 GENERALIZED ANXIETY DISORDER WITH PANIC ATTACKS: ICD-10-CM

## 2025-01-29 DIAGNOSIS — F90.0 ADHD (ATTENTION DEFICIT HYPERACTIVITY DISORDER), INATTENTIVE TYPE: ICD-10-CM

## 2025-01-29 DIAGNOSIS — F32.4 MAJOR DEPRESSIVE DISORDER WITH SINGLE EPISODE, IN PARTIAL REMISSION: Primary | ICD-10-CM

## 2025-01-29 PROCEDURE — 99999 PR PBB SHADOW E&M-EST. PATIENT-LVL III: CPT | Mod: PBBFAC,,, | Performed by: STUDENT IN AN ORGANIZED HEALTH CARE EDUCATION/TRAINING PROGRAM

## 2025-01-29 PROCEDURE — 3008F BODY MASS INDEX DOCD: CPT | Mod: CPTII,S$GLB,, | Performed by: STUDENT IN AN ORGANIZED HEALTH CARE EDUCATION/TRAINING PROGRAM

## 2025-01-29 PROCEDURE — 99214 OFFICE O/P EST MOD 30 MIN: CPT | Mod: S$GLB,,, | Performed by: STUDENT IN AN ORGANIZED HEALTH CARE EDUCATION/TRAINING PROGRAM

## 2025-01-29 PROCEDURE — 3079F DIAST BP 80-89 MM HG: CPT | Mod: CPTII,S$GLB,, | Performed by: STUDENT IN AN ORGANIZED HEALTH CARE EDUCATION/TRAINING PROGRAM

## 2025-01-29 PROCEDURE — 1159F MED LIST DOCD IN RCRD: CPT | Mod: CPTII,S$GLB,, | Performed by: STUDENT IN AN ORGANIZED HEALTH CARE EDUCATION/TRAINING PROGRAM

## 2025-01-29 PROCEDURE — G2211 COMPLEX E/M VISIT ADD ON: HCPCS | Mod: S$GLB,,, | Performed by: STUDENT IN AN ORGANIZED HEALTH CARE EDUCATION/TRAINING PROGRAM

## 2025-01-29 PROCEDURE — 3074F SYST BP LT 130 MM HG: CPT | Mod: CPTII,S$GLB,, | Performed by: STUDENT IN AN ORGANIZED HEALTH CARE EDUCATION/TRAINING PROGRAM

## 2025-01-29 PROCEDURE — 1160F RVW MEDS BY RX/DR IN RCRD: CPT | Mod: CPTII,S$GLB,, | Performed by: STUDENT IN AN ORGANIZED HEALTH CARE EDUCATION/TRAINING PROGRAM

## 2025-01-29 PROCEDURE — 96136 PSYCL/NRPSYC TST PHY/QHP 1ST: CPT | Mod: 59,S$GLB,, | Performed by: STUDENT IN AN ORGANIZED HEALTH CARE EDUCATION/TRAINING PROGRAM

## 2025-01-29 RX ORDER — PROPRANOLOL HYDROCHLORIDE 10 MG/1
10 TABLET ORAL 2 TIMES DAILY
Qty: 60 TABLET | Refills: 1 | Status: SHIPPED | OUTPATIENT
Start: 2025-01-29 | End: 2025-03-30

## 2025-01-29 RX ORDER — SERTRALINE HYDROCHLORIDE 25 MG/1
75 TABLET, FILM COATED ORAL DAILY
Qty: 90 TABLET | Refills: 2 | Status: SHIPPED | OUTPATIENT
Start: 2025-01-29 | End: 2025-04-29

## 2025-01-29 RX ORDER — LISDEXAMFETAMINE DIMESYLATE 20 MG/1
20 CAPSULE ORAL EVERY MORNING
Qty: 30 CAPSULE | Refills: 0 | Status: SHIPPED | OUTPATIENT
Start: 2025-01-29 | End: 2025-02-28

## 2025-01-29 NOTE — PROGRESS NOTES
Outpatient Psychiatry Followup Visit  1/29/2025  Assessment & Plan    Impression     ICD-10-CM ICD-9-CM   1. Major depressive disorder with single episode, in partial remission  F32.4 296.25   2. Social anxiety disorder  F40.10 300.23   3. Generalized anxiety disorder with panic attacks  F41.1 300.02    F41.0 300.01   4. ADHD (attention deficit hyperactivity disorder), inattentive type  F90.0 314.00   5. BMI 25.0-25.9,adult  Z68.25 V85.21      Plan of Care & Medication Management    Chart was reviewed. The risks and benefits of medication were discussed with pt. The treatment plan and followup plan were reviewed with pt. Pt understands to contact clinic if symptoms worsen. Pt understands to call 911 or go to nearest ER for suicidal ideation, intent or plan. Unless otherwise specified, pt did NOT display signs of nor endorse symptoms of overt psychosis or acute mood disorder requiring hospitalization during the encounter; pt denied violent thoughts or suicidal or homicidal ideation, intent, or plan.   RX History ATARAX/VISTARIL (tachycardia), BUSPAR (panic attacks while taking), PROPRANOLOL, ZOLOFT   Current RX Continue PROPRANOLOL  Adjustments:  12/11/2024: Start 10mg BID  Start VYVANSE  Pt was provided [employer] educational material 1/29/2025  Monitor BP  Adjustments:  1/29/2025: Start 20mg qam  Prior to 1/29/2025 pt was taking ZOLOFT 75mg daily  1/29/2025 ASRS 4/3 (18-29)  Reduce ZOLOFT  Adjustments:  1/29/2025: Reduce to 75mg daily  1/29/2025: Reports taking 75mg daily (100mg worsened anxiety and early AM awakenings)  12/11/2024: Increase to 100mg daily  10/30/2024: Increase to 75mg daily  9/24/2024: Continue ZOLOFT 50mg daily  Prior to 9/24/2024, pt had been taking ZOLOFT 50mg for 1 week      Education, Counseling & Monitoring []SLEEP HYGIENE  []THERAPY  [x]CONTRACT 1/29/2025?  [x] REVIEWED 1/29/2025?  []NRT  []   Other Orders    RETURN D: ELEVATED PROTOCOL: RETURN IN 2 WEEKS       Subjective    Available  "documentation has been reviewed, and pertinent elements of the chart have been incorporated into this note where appropriate. Last [facility] encounter with writer was on 12/11/2024   Yovani Melvin, a 39 y.o. male, presenting for followup visit. This visit was done in person, IN CLINIC.      In interim tested positive for ADHD-PI  Agrees with dx    Overall, "doing pretty good"  Better since last encounter  Less depressed    Anxiety is controlled     Had to reduce ZOLOFT back to 75mg  Reports 100mg worsened anxiety and caused early AM awakenings    Seems to be adherent to pharmacotherapy    Tolerating PROPRANOLOL  BP has improved    Sleep is fair    Discussed ADHD  Administered ASRS    Discussed potential risks and potential benefits of ADHD treatment  Agreed to start VYVANSE 20mg qam  Pt will stop taking if tachycardic  Increase visit frequency to w2w  Will continue treatment otherwise as planned      reviewed, contract signed        Objective    Mental Status and Physical Exam  1. Appearance: Dress is informal but appropriate. Motor activity normal.  2. Discourse: Clear speech with normal rate and volume. Associations intact. Orderly.  3. Emotional Expression: Somewhat anxious. Affect is appropriate.  4. Perception and Thinking: No hallucinations. No suicidality, no homicidality, delusions, or paranoia.  5. Sensorium: Grossly intact. Able to focus for interview.  6. Memory and Fund of Knowledge: Intact for content of interview.  7. Insight and Judgment: Intact.    Constitutional / General  Vitals:    01/29/25 1428   BP: 122/83   Pulse: 70   Weight: 74.7 kg (164 lb 10.9 oz)   Height: 5' 8" (1.727 m)     (Current body mass index is 25.04 kg/m².)         Measurement-Based Care (MBC):     Routine Instruments   PROMIS-ANXIETY Interpretation: 5 (4a raw score): T-SCORE 48; NONE TO SLIGHT using 55-60-70 cutoffs.   PROMIS-DEPRESSION Interpretation: 4 (4a raw score): T-SCORE 41.0; NONE TO SLIGHT using 55-60-70 cutoffs.   PSS4 " Interpretation: 05/16; LOW using 6-11 cutoffs. 0 PH, 0 LSE.   Additional Instruments   MBC ANCHOR : much better  ASRS Interpretation: ASRS A: 18/24 (STRATA 4); ASRS B: 29/48 (STRATA 3).       Auto-populated Chart Data:     The chart was reviewed for recent diagnostic procedures and investigations, and pertinent results are noted below.   Encounter Medications  Outpatient Encounter Medications as of 1/29/2025   Medication Sig Dispense Refill    apixaban (ELIQUIS) 5 mg Tab Take 1 tablet (5 mg total) by mouth 2 (two) times daily. Take 10mg bid x7 days, then 5mg bid 180 tablet 1    [DISCONTINUED] propranoloL (INDERAL) 10 MG tablet Take 1 tablet (10 mg total) by mouth 2 (two) times daily. 60 tablet 1    [DISCONTINUED] sertraline (ZOLOFT) 100 MG tablet Take 1 tablet (100 mg total) by mouth once daily. 30 tablet 1    lisdexamfetamine (VYVANSE) 20 MG capsule Take 1 capsule (20 mg total) by mouth every morning. 30 capsule 0    propranoloL (INDERAL) 10 MG tablet Take 1 tablet (10 mg total) by mouth 2 (two) times daily. 60 tablet 1    sertraline (ZOLOFT) 25 MG tablet Take 3 tablets (75 mg total) by mouth once daily. 90 tablet 2     No facility-administered encounter medications on file as of 1/29/2025.      Cardiometabolic  EKG Results  Results for orders placed or performed during the hospital encounter of 07/22/24   EKG 12-lead    Collection Time: 07/22/24  7:25 PM   Result Value Ref Range    QRS Duration 86 ms    [facility] QTC Calculation 414 ms    Narrative    Test Reason : R07.9,    Vent. Rate : 085 BPM     Atrial Rate : 085 BPM     P-R Int : 136 ms          QRS Dur : 086 ms      QT Int : 348 ms       P-R-T Axes : 027 050 019 degrees     QTc Int : 414 ms    Normal sinus rhythm  Normal ECG  When compared with ECG of 21-JUL-2024 03:41,  No significant change was found  Confirmed by Jie KIRKPATRICK, Carmelina Myrick ([id number]) on 7/23/2024 4:51:45 PM    Referred By: [email or web address]   SELF           Confirmed By:Carmelina Ceron MD        Labs  Lab  "Results   Component Value Date    TSH 2.71 05/16/2024     07/23/2024    HGBA1C 5.6 05/16/2024    CHOL 228 (H) 05/16/2024    TRIG 152 (H) 05/16/2024    HDL 35 (L) 05/16/2024    LDLCALC 163 (H) 05/16/2024       BMI Trend - (Current body mass index is 25.04 kg/m².)  Wt Readings from Last 7 Encounters:   01/29/25 74.7 kg (164 lb 10.9 oz)   12/11/24 74.1 kg (163 lb 5.8 oz)   10/30/24 73.5 kg (162 lb 0.6 oz)   09/24/24 71.8 kg (158 lb 4.6 oz)   09/13/24 71.9 kg (158 lb 8.2 oz)   09/03/24 71.7 kg (158 lb 1.6 oz)   08/28/24 72.4 kg (159 lb 9.8 oz)       BP/HR Trend   BP Readings from Last 3 Encounters:   01/29/25 122/83   12/11/24 (!) 129/90   10/30/24 (!) 138/91      Pulse Readings from Last 3 Encounters:   01/29/25 70   12/11/24 71   10/30/24 68       Endocrine Lab Results   Component Value Date    TSH 2.71 05/16/2024      Hematologic   Lab Results   Component Value Date    WBC 4.92 08/12/2024    RBC 5.24 08/12/2024    HGB 15.4 08/12/2024    HCT 49.0 08/12/2024    MCV 94 08/12/2024    MCH 29.4 08/12/2024    RDW 12.4 08/12/2024     08/12/2024    MPV 10.0 08/12/2024    GRAN 3.4 08/12/2024    GRAN 68.8 08/12/2024      Hepatorenal Lab Results   Component Value Date     07/23/2024    K 4.0 07/23/2024    CALCIUM 8.8 07/23/2024    PHOS 3.7 07/22/2024    MG 2.3 07/22/2024    CO2 19 (L) 07/23/2024    ANIONGAP 13 07/23/2024    CREATININE 1.0 07/23/2024    BUN 14 07/23/2024    EGFRNORACEVR >60.0 07/23/2024    SPECGRAV 1.020 07/21/2024    PROTEINUA 2+ (A) 07/21/2024    AST 15 07/23/2024    ALT 15 07/23/2024    BILITOT 0.5 07/23/2024    LABPROT 11.1 07/22/2024    ALBUMIN 4.2 07/23/2024    INR 1.0 07/22/2024      Medication Level No results found for: "LITHIUM", "VALPROATE", "CBMZ", "CARBAMAZ", "LAMOTRIGINE", "PHENYTOIN", "PHENOBARB", "CLOZAPINE", "NORCLOZAP", "CLOZNORCLOZ", "CLONAZEPAM", "MERCY", "VENLAFAXINE", "NORTRIP", "OXCARBAZ"   Other Labs Lab Results   Component Value Date    HEPCAB NON-REACTIVE 05/16/2024    " "  Drug Screening   AMP * (*) METHAMP * (*)   CHANCE * (*) MORPH * (*)   BUP * (*) OXY * (*)   BENZO * (*) METHADONE * (*)   BULMARO * (*) THC * (*)   HX No results found for: "AMPHETAMINES", "PCDSOPHENCYN", "COCAINEMETAB", "POCCOC", "COCAINE", "BULMARO", "COCAINEURINE", "POCCOCDRUG", "PCDSUTRAMA", "PCDSOBENZOD", "BARBITURATES", "PCDSCOMETHA", "OPIATESCREEN", "PCDSUBUPRE", "PCDSUFENTANY", "PCDSUOXYCOD", "BUPRENORPH", "NORBUPRENOR", "MARIJUANATHC"  No results found for: "PCDSOALCOHOL", "ALCOHOLMEDIC", "THEYLGLUCU", "ETHYLSULF", "[id number]", "PETH"         Billing Documentation:     Method of Encounter IN PERSON visit at the clinic, established   E/M Code 15602: FOLLOW UP VISIT, Rx mgmt, "Multiple STABLE chronic illnesses"   Additional Codes and Modifiers     10822, with modifer 59: administered and scored more than one psychological or neuropsychological tests (see MBC above) (16+ mins)  , without modifiers -24,-25,-53: COMPLEXITY: Visit today included increased complexity associated with the care of the episodic problem(s) (multiple psychiatric disorders - see above) addressed and managing the longitudinal care of the patient due to the serious and/or complex managed problem(s) (multiple psychiatric disorders - see above).   David Stein DO  Department of Psychiatry, [facility]        "

## 2025-02-11 ENCOUNTER — PATIENT MESSAGE (OUTPATIENT)
Dept: HEMATOLOGY/ONCOLOGY | Facility: CLINIC | Age: 40
End: 2025-02-11
Payer: COMMERCIAL

## 2025-02-12 ENCOUNTER — HOSPITAL ENCOUNTER (OUTPATIENT)
Dept: RADIOLOGY | Facility: HOSPITAL | Age: 40
Discharge: HOME | End: 2025-02-12
Attending: INTERNAL MEDICINE
Payer: COMMERCIAL

## 2025-02-12 ENCOUNTER — OFFICE VISIT (OUTPATIENT)
Dept: PSYCHIATRY | Facility: CLINIC | Age: 40
End: 2025-02-12
Payer: COMMERCIAL

## 2025-02-12 VITALS
SYSTOLIC BLOOD PRESSURE: 122 MMHG | HEIGHT: 68 IN | BODY MASS INDEX: 24.72 KG/M2 | DIASTOLIC BLOOD PRESSURE: 83 MMHG | WEIGHT: 163.13 LBS | HEART RATE: 80 BPM

## 2025-02-12 DIAGNOSIS — F41.0 GENERALIZED ANXIETY DISORDER WITH PANIC ATTACKS: ICD-10-CM

## 2025-02-12 DIAGNOSIS — F90.0 ADHD (ATTENTION DEFICIT HYPERACTIVITY DISORDER), INATTENTIVE TYPE: ICD-10-CM

## 2025-02-12 DIAGNOSIS — R07.9 CHEST PAIN, UNSPECIFIED TYPE: ICD-10-CM

## 2025-02-12 DIAGNOSIS — F40.10 SOCIAL ANXIETY DISORDER: ICD-10-CM

## 2025-02-12 DIAGNOSIS — F41.1 GENERALIZED ANXIETY DISORDER WITH PANIC ATTACKS: ICD-10-CM

## 2025-02-12 DIAGNOSIS — F32.4 MAJOR DEPRESSIVE DISORDER WITH SINGLE EPISODE, IN PARTIAL REMISSION: Primary | ICD-10-CM

## 2025-02-12 PROCEDURE — 71275 CT ANGIOGRAPHY CHEST: CPT | Mod: TC

## 2025-02-12 PROCEDURE — 3079F DIAST BP 80-89 MM HG: CPT | Mod: CPTII,S$GLB,, | Performed by: STUDENT IN AN ORGANIZED HEALTH CARE EDUCATION/TRAINING PROGRAM

## 2025-02-12 PROCEDURE — 93970 EXTREMITY STUDY: CPT | Mod: 26,,, | Performed by: RADIOLOGY

## 2025-02-12 PROCEDURE — 3008F BODY MASS INDEX DOCD: CPT | Mod: CPTII,S$GLB,, | Performed by: STUDENT IN AN ORGANIZED HEALTH CARE EDUCATION/TRAINING PROGRAM

## 2025-02-12 PROCEDURE — G2211 COMPLEX E/M VISIT ADD ON: HCPCS | Mod: S$GLB,,, | Performed by: STUDENT IN AN ORGANIZED HEALTH CARE EDUCATION/TRAINING PROGRAM

## 2025-02-12 PROCEDURE — 96136 PSYCL/NRPSYC TST PHY/QHP 1ST: CPT | Mod: 59,S$GLB,, | Performed by: STUDENT IN AN ORGANIZED HEALTH CARE EDUCATION/TRAINING PROGRAM

## 2025-02-12 PROCEDURE — 93970 EXTREMITY STUDY: CPT | Mod: TC

## 2025-02-12 PROCEDURE — 3074F SYST BP LT 130 MM HG: CPT | Mod: CPTII,S$GLB,, | Performed by: STUDENT IN AN ORGANIZED HEALTH CARE EDUCATION/TRAINING PROGRAM

## 2025-02-12 PROCEDURE — 25500020 PHARM REV CODE 255

## 2025-02-12 PROCEDURE — 1159F MED LIST DOCD IN RCRD: CPT | Mod: CPTII,S$GLB,, | Performed by: STUDENT IN AN ORGANIZED HEALTH CARE EDUCATION/TRAINING PROGRAM

## 2025-02-12 PROCEDURE — 71275 CT ANGIOGRAPHY CHEST: CPT | Mod: 26,,, | Performed by: RADIOLOGY

## 2025-02-12 PROCEDURE — 99214 OFFICE O/P EST MOD 30 MIN: CPT | Mod: S$GLB,,, | Performed by: STUDENT IN AN ORGANIZED HEALTH CARE EDUCATION/TRAINING PROGRAM

## 2025-02-12 PROCEDURE — 1160F RVW MEDS BY RX/DR IN RCRD: CPT | Mod: CPTII,S$GLB,, | Performed by: STUDENT IN AN ORGANIZED HEALTH CARE EDUCATION/TRAINING PROGRAM

## 2025-02-12 PROCEDURE — 99999 PR PBB SHADOW E&M-EST. PATIENT-LVL III: CPT | Mod: PBBFAC,,, | Performed by: STUDENT IN AN ORGANIZED HEALTH CARE EDUCATION/TRAINING PROGRAM

## 2025-02-12 RX ORDER — LISDEXAMFETAMINE DIMESYLATE 30 MG/1
30 CAPSULE ORAL DAILY
Qty: 30 CAPSULE | Refills: 0 | Status: SHIPPED | OUTPATIENT
Start: 2025-02-12 | End: 2025-03-14

## 2025-02-12 RX ORDER — PROPRANOLOL HYDROCHLORIDE 10 MG/1
10 TABLET ORAL 2 TIMES DAILY
Qty: 60 TABLET | Refills: 1 | Status: SHIPPED | OUTPATIENT
Start: 2025-02-12 | End: 2025-04-13

## 2025-02-12 RX ORDER — SERTRALINE HYDROCHLORIDE 25 MG/1
75 TABLET, FILM COATED ORAL DAILY
Qty: 90 TABLET | Refills: 2 | Status: SHIPPED | OUTPATIENT
Start: 2025-02-12 | End: 2025-05-13

## 2025-02-12 RX ADMIN — IOHEXOL 75 ML: 350 INJECTION, SOLUTION INTRAVENOUS at 09:02

## 2025-02-12 NOTE — PROGRESS NOTES
Outpatient Psychiatry Followup Visit  2/12/2025  Assessment & Plan    Impression     ICD-10-CM ICD-9-CM   1. Major depressive disorder with single episode, in partial remission  F32.4 296.25   2. Social anxiety disorder  F40.10 300.23   3. Generalized anxiety disorder with panic attacks  F41.1 300.02    F41.0 300.01   4. ADHD (attention deficit hyperactivity disorder), inattentive type  F90.0 314.00   5. BMI 24.0-24.9, adult  Z68.24 V85.1      Plan of Care & Medication Management    Chart was reviewed. The risks and benefits of medication were discussed with pt. The treatment plan and followup plan were reviewed with pt. Pt understands to contact clinic if symptoms worsen. Pt understands to call 911 or go to nearest ER for suicidal ideation, intent or plan. Unless otherwise specified, pt did NOT display signs of nor endorse symptoms of overt psychosis or acute mood disorder requiring hospitalization during the encounter; pt denied violent thoughts or suicidal or homicidal ideation, intent, or plan.   RX History ATARAX/VISTARIL (tachycardia), BUSPAR (panic attacks while taking), PROPRANOLOL, VYVANSE, ZOLOFT   Current RX Continue PROPRANOLOL  Adjustments:  12/11/2024: Start 10mg BID  Increase VYVANSE  Pt was provided [employer] educational material 1/29/2025  Monitor BP  Adjustments:  2/12/2025: Increase to 30mg qam  1/29/2025: Start 20mg qam  2/12/2025 ASRS 2/2 (10-23)  Prior to 1/29/2025 pt was taking ZOLOFT 75mg daily  1/29/2025 ASRS 4/3 (18-29)  Continue ZOLOFT  Adjustments:  1/29/2025: Reduce to 75mg daily  1/29/2025: Reports taking 75mg daily (100mg worsened anxiety and early AM awakenings)  12/11/2024: Increase to 100mg daily  10/30/2024: Increase to 75mg daily  9/24/2024: Continue ZOLOFT 50mg daily  Prior to 9/24/2024, pt had been taking ZOLOFT 50mg for 1 week      Education, Counseling & Monitoring []SLEEP HYGIENE  []THERAPY  [x]CONTRACT 2/12/2025?  [x] REVIEWED 2/12/2025?  []NRT  []   Other Orders    RETURN  "K: STANDARD PROTOCOL: RETURN IN 4 WEEKS (ONE MONTH)       Subjective    Available documentation has been reviewed, and pertinent elements of the chart have been incorporated into this note where appropriate. Last Epic encounter with writer was on 1/29/2025   Yovani Melvin, a 39 y.o. male, presenting for followup visit. This visit was done in person, IN CLINIC.       reviewed, contract signed  Changed pharmacy    Likes VYVANSE  Better concentration  More productive  More energy  More patience  Daydreaming persists  Fidgeting persists    ASRS repeated, improvement     Sleep is fair  NO early am awakenings  Anxiety is controlled  Denies depressed mood     BP wnl  Discussed increasing VYVANSE  Reduce visit frequency to q1m  Will continue treatment otherwise as planned        Objective    Mental Status and Physical Exam  1. Appearance: Dress is informal but appropriate. Motor activity normal.  2. Discourse: Clear speech with normal rate and volume. Associations intact. Orderly.  3. Emotional Expression: Somewhat anxious. Affect is appropriate.  4. Perception and Thinking: No hallucinations. No suicidality, no homicidality, delusions, or paranoia.  5. Sensorium: Grossly intact. Able to focus for interview.  6. Memory and Fund of Knowledge: Intact for content of interview.  7. Insight and Judgment: Intact.    Constitutional / General  Vitals:    02/12/25 1553   BP: 122/83   Pulse: 80   Weight: 74 kg (163 lb 2.3 oz)   Height: 5' 8" (1.727 m)     (Current body mass index is 24.81 kg/m².)         Measurement-Based Care (MBC):     Routine Instruments   PROMIS-ANXIETY Interpretation: 4 (4a raw score): T-SCORE 40.3; NONE TO SLIGHT using 55-60-70 cutoffs.   PROMIS-DEPRESSION Interpretation: 4 (4a raw score): T-SCORE 41.0; NONE TO SLIGHT using 55-60-70 cutoffs.   PSS4 Interpretation: 03/16; LOW using 6-11 cutoffs. 0 PH, 0 LSE.   Additional Instruments   MBC ANCHOR : a little better  ASRS Interpretation: ASRS A: 10/24 (STRATA 2); " "ASRS B: 23/48 (STRATA 2). Compared to last time of 4/3, severity IMPROVED.       Auto-populated chart data omitted from this note for brevity.      Billing Documentation:     Method of Encounter IN PERSON visit at the clinic, established   E/M Code 31361: FOLLOW UP VISIT, Rx mgmt, "Multiple STABLE chronic illnesses"   Additional Codes and Modifiers     91349, with modifer 59: administered and scored more than one psychological or neuropsychological tests (see MBC above) (16+ mins)  , without modifiers -24,-25,-53: COMPLEXITY: Visit today included increased complexity associated with the care of the episodic problem(s) (multiple psychiatric disorders - see above) addressed and managing the longitudinal care of the patient due to the serious and/or complex managed problem(s) (multiple psychiatric disorders - see above).   Time N/A - Not billing for time        Mumtaz Stein DO  Department of Psychiatry, [facility]        "

## 2025-02-15 ENCOUNTER — PATIENT MESSAGE (OUTPATIENT)
Dept: HEMATOLOGY/ONCOLOGY | Facility: CLINIC | Age: 40
End: 2025-02-15
Payer: COMMERCIAL

## 2025-02-19 ENCOUNTER — OFFICE VISIT (OUTPATIENT)
Dept: HEMATOLOGY/ONCOLOGY | Facility: CLINIC | Age: 40
End: 2025-02-19
Payer: COMMERCIAL

## 2025-02-19 DIAGNOSIS — Z86.711 HISTORY OF PULMONARY EMBOLUS (PE): ICD-10-CM

## 2025-02-19 DIAGNOSIS — I26.99 BILATERAL PULMONARY EMBOLISM: Primary | ICD-10-CM

## 2025-02-19 NOTE — PROGRESS NOTES
Subjective:    The patient location is:  home  Visit type: Virtual visit with synchronous audio and video  Face-to-face or time spent with patient on the encounter:25 min  Total time spent on and for  this encounter which includes non face-to-face time preparing to see patient, review of tests, obtaining and or reviewing separately obtained records documenting clinical information in the electronic or other health records, independently interpreting results which is not separately reported ,and communicating results to the patient/family/caregiver and in care coordination and treatment planning/communicating with pharmacy for prescriptions/addressing social needs/arranging follow-up and or referrals :25 min    Each patient I provide medical services by telemedicine is:  (1) informed of the relationship between the physician and patient and the respective role of any other health care provider with respect to management of the patient; and (2) notified that he or she may decline to receive medical services by telemedicine and may withdraw from such care at any time.  This is a video visit therefore some elements of the physical exam such as vital signs, heart sounds are breath sounds are not included and may be included if found in recent clinic notes of other providers assessing same patient. Any symptoms or signs that were visualized were stated by the patient may be included in this note.    Patient ID: Yvoani Melvin is a 39 y.o. male.    Chief Complaint:  f/u    HPI  39-year-old with a history of kidney stones and left ureteral stent went to the emergency room July 2024 diagnosed with PE any complaint of chest pain started  Eliquis first went to one hospital 2 days later went again 7/21 told he had pe /7/24 was told no pE  Nofamily hx grandfather ?? Clot, had kidney stent 7/5 tooth pulled end of may 2024 was in bed for a few days after stent  Review of Systems   Constitutional:  Negative for appetite change and  unexpected weight change.   HENT:  Negative for mouth sores.    Eyes:  Negative for visual disturbance.   Respiratory:  Negative for cough and shortness of breath.    Cardiovascular:  Negative for chest pain.   Gastrointestinal:  Negative for abdominal pain and diarrhea.   Genitourinary:  Negative for frequency.   Musculoskeletal:  Negative for back pain.   Skin:  Negative for rash.   Hematological:  Negative for adenopathy.   Psychiatric/Behavioral:  The patient is not nervous/anxious.                   Past Medical History:   Diagnosis Date    Nephrolithiasis     Other pulmonary embolism without acute cor pulmonale      Past Surgical History:   Procedure Laterality Date    CYSTOURETEROSCOPY, WITH HOLMIUM LASER LITHOTRIPSY OF URETERAL CALCULUS AND STENT INSERTION Left 7/5/2024    Procedure: CYSTOURETEROSCOPY, WITH HOLMIUM LASER LITHOTRIPSY OF URETERAL CALCULUS AND STENT INSERTION;  Surgeon: Milana Encinas MD;  Location: [facility];  Service: Urology;  Laterality: Left;    URETEROSCOPIC REMOVAL OF URETERIC CALCULUS N/A 7/5/2024    Procedure: REMOVAL, CALCULUS, URETER, URETEROSCOPIC;  Surgeon: Milana Encinas MD;  Location: [facility];  Service: Urology;  Laterality: N/A;     Family History   Problem Relation Name Age of Onset    Migraines Mother      Hypertension Mother      Diabetes Father      Diabetes Paternal Aunt      Sarcoidosis Paternal Aunt      Diabetes Paternal Grandfather      Emphysema Maternal Grandfather      COPD Maternal Grandfather        Social History     Socioeconomic History    Marital status:    Tobacco Use    Smoking status: Never    Smokeless tobacco: Never   Substance and Sexual Activity    Alcohol use: Never    Drug use: Never     Social Drivers of Health     Financial Resource Strain: Patient Declined (2/18/2025)    Overall Financial Resource Strain (CARDIA)     Difficulty of Paying Living Expenses: Patient declined   Food Insecurity: Patient Declined (2/18/2025)    Hunger  Vital Sign     Worried About Running Out of Food in the Last Year: Patient declined     Ran Out of Food in the Last Year: Patient declined   Transportation Needs: No Transportation Needs (2/18/2025)    PRAPARE - Transportation     Lack of Transportation (Medical): No     Lack of Transportation (Non-Medical): No   Physical Activity: Unknown (2/18/2025)    Exercise Vital Sign     Days of Exercise per Week: 3 days     Minutes of Exercise per Session: Patient declined   Stress: No Stress Concern Present (2/18/2025)    Citizen of [place] [employer] - Occupational Stress Questionnaire     Feeling of Stress : Only a little   Housing Stability: High Risk (2/18/2025)    Housing Stability Vital Sign     Unable to Pay for Housing in the Last Year: Yes     Number of Times Moved in the Last Year: 1     Homeless in the Last Year: No     Review of patient's allergies indicates:   Allergen Reactions    Aspirin Palpitations and Shortness Of Breath    Hydrocodone     Penicillins        Current Outpatient Medications:     apixaban (ELIQUIS) 5 mg Tab, Take 1 tablet (5 mg total) by mouth 2 (two) times daily. Take 10mg bid x7 days, then 5mg bid, Disp: 180 tablet, Rfl: 1    lisdexamfetamine (VYVANSE) 30 MG capsule, Take 1 capsule (30 mg total) by mouth once daily., Disp: 30 capsule, Rfl: 0    propranoloL (INDERAL) 10 MG tablet, Take 1 tablet (10 mg total) by mouth 2 (two) times daily., Disp: 60 tablet, Rfl: 1    sertraline (ZOLOFT) 25 MG tablet, Take 3 tablets (75 mg total) by mouth once daily., Disp: 90 tablet, Rfl: 2    Physical Exam    Wt Readings from Last 3 Encounters:   09/13/24 71.9 kg (158 lb 8.2 oz)   09/03/24 71.7 kg (158 lb 1.6 oz)   08/28/24 72.4 kg (159 lb 9.8 oz)     Temp Readings from Last 3 Encounters:   09/13/24 96.8 °F (36 °C) (Temporal)   09/03/24 98 °F (36.7 °C) (Oral)   08/12/24 98.7 °F (37.1 °C) (Oral)     BP Readings from Last 3 Encounters:   09/13/24 117/76   09/03/24 104/82   08/28/24 122/62     Pulse  Readings from Last 3 Encounters:   09/13/24 80   09/03/24 73   08/28/24 88    VITAL SIGNS:  as above   GENERAL: appears well-built, well-nourished.  No anxiety, no agitation, and in no distress.  Patient is awake, alert, oriented and cooperative.  HEENT:  Showed no congestion. Trachea is central no obvious icterus or pallor noted no hoarseness. no obvious JVD   NECK:  Supple.  No JVD. No obvious cervical submental or supraclavicular adenopathy.  RS:the visualized portion of  Chest expands well. chest appears symmetric, no audible wheezes.  No dyspnea recognized  ABDOMEN:  abdomen appears undistended.  EXTREMITIES:  Without edema.  NEUROLOGICAL:  The patient is appropriate, higher functions are normal.  No  obvious neurological deficits.  normal judgement normal thought content  No confusion, no speech impediment. Cranial nerves are intact and show no deficit. No gross motor deficits noted   SKIN MUSCULOSKELETAL: no joint or skeletal deformity, no clubbing of nails.  No visible rash ecchymosis or petechiae     Lab Results   Component Value Date    WBC 4.92 08/12/2024    HGB 15.4 08/12/2024    HCT 49.0 08/12/2024    MCV 94 08/12/2024     08/12/2024       BMP  Lab Results   Component Value Date     07/23/2024    K 4.0 07/23/2024     07/23/2024    CO2 19 (L) 07/23/2024    BUN 14 07/23/2024    CREATININE 1.0 07/23/2024    CALCIUM 8.8 07/23/2024    ANIONGAP 13 07/23/2024    ESTGFRAFRICA >60 11/06/2019    EGFRNONAA >60 11/06/2019 July 21, 2024  Impression:     Probable small tertiary artery pulmonary emboli bilaterally.  Bilateral dependent atelectasis.  Small area of atelectasis in the left lingula.    July 23, 2024  Impression:     1.  Resolution of the tertiary pulmonary emboli in the lung bases.  No new pulmonary emboli.     2.  Atelectasis in lung bases and lingula.   2/25 no DVT and neg CTA     Patient Active Problem List   Diagnosis    Ureteral stone    Acute cystitis with hematuria     Generalized anxiety disorder with panic attacks    Major depressive disorder with single episode, in partial remission    Social anxiety disorder    BMI 24.0-24.9, adult    ADHD (attention deficit hyperactivity disorder), inattentive type    BMI 25.0-25.9,adult        Assessment and Plan     Possible PE diagnosed on CTA July 21, 2024 but showing resolution by July 23, 2024 ( he thinks he had a panic attack) this was post urethral stenting procedure  Repeat CTA September 20 24- for PE  had no dvt on doppler 7/24   Pt wanted to be very safe . Sowill stay on anticaog for total 6 months repeat usg and cta 2/25 neg   Dc anticoag    Draw hypercoag w/u in 2 weeks see me with results virtual visit      Palpitation:refer  to cardiology for w/u  Kidensy stones and occ hematuria> stent is out    Anxiety attacks: sees psychaitry to help    MDM includes  :    - Acute or chronic illness or injury that poses a threat to life or bodily function  - Independent review and explanation of 3+ results from unique tests  - Discussion of management and ordering 3+ unique tests  - Extensive discussion of treatment and management  - Prescription drug management  - Drug therapy requiring intensive monitoring for toxicity

## 2025-02-19 NOTE — Clinical Note
Patient to discontinue Eliquis draw entire labs for hypercoagulability ordered  in 2 weeks in [place] [facility] see me when results are available virtual visit make sure all the results are available before I get to see patient

## 2025-02-20 ENCOUNTER — PATIENT MESSAGE (OUTPATIENT)
Dept: HEMATOLOGY/ONCOLOGY | Facility: CLINIC | Age: 40
End: 2025-02-20
Payer: COMMERCIAL

## 2025-03-05 ENCOUNTER — LAB VISIT (OUTPATIENT)
Dept: LAB | Facility: HOSPITAL | Age: 40
End: 2025-03-05
Attending: INTERNAL MEDICINE
Payer: COMMERCIAL

## 2025-03-05 DIAGNOSIS — I26.99 BILATERAL PULMONARY EMBOLISM: ICD-10-CM

## 2025-03-05 LAB — HCYS SERPL-SCNC: 10.7 UMOL/L (ref 4–16.5)

## 2025-03-05 PROCEDURE — 85303 CLOT INHIBIT PROT C ACTIVITY: CPT | Performed by: INTERNAL MEDICINE

## 2025-03-05 PROCEDURE — 36415 COLL VENOUS BLD VENIPUNCTURE: CPT | Mod: PO | Performed by: INTERNAL MEDICINE

## 2025-03-05 PROCEDURE — 85610 PROTHROMBIN TIME: CPT | Performed by: INTERNAL MEDICINE

## 2025-03-05 PROCEDURE — 81241 F5 GENE: CPT | Performed by: INTERNAL MEDICINE

## 2025-03-05 PROCEDURE — 85300 ANTITHROMBIN III ACTIVITY: CPT | Performed by: INTERNAL MEDICINE

## 2025-03-05 PROCEDURE — 83090 ASSAY OF HOMOCYSTEINE: CPT | Performed by: INTERNAL MEDICINE

## 2025-03-05 PROCEDURE — 85306 CLOT INHIBIT PROT S FREE: CPT | Performed by: INTERNAL MEDICINE

## 2025-03-05 PROCEDURE — 81240 F2 GENE: CPT | Performed by: INTERNAL MEDICINE

## 2025-03-05 PROCEDURE — 86147 CARDIOLIPIN ANTIBODY EA IG: CPT | Mod: 59 | Performed by: INTERNAL MEDICINE

## 2025-03-05 PROCEDURE — 85306 CLOT INHIBIT PROT S FREE: CPT | Mod: 91 | Performed by: INTERNAL MEDICINE

## 2025-03-05 PROCEDURE — 85302 CLOT INHIBIT PROT C ANTIGEN: CPT | Performed by: INTERNAL MEDICINE

## 2025-03-06 LAB — AT III ACT/NOR PPP CHRO: 115 % (ref 83–118)

## 2025-03-07 LAB
PROT C ACT/NOR PPP CHRO: 144 % (ref 70–150)
PROT S ACT/NOR PPP: 94 % (ref 65–150)

## 2025-03-08 LAB
CONFIRM DRVVT STA-STACLOT: NORMAL S
DRVVT SCREEN TO CONFIRM RATIO: NORMAL {RATIO}
HEPARIN NT PPP QL: NORMAL
LA 3 SCREEN W REFLEX-IMP: NORMAL
LMW HEPARIN IND PLT AB SER QL: NORMAL
MIXING DRVVT/NORMAL: NORMAL %
NEUTRALIZED DRVVT SCREEN RATIO: NORMAL
PROTHROMBIN TIME: 12.8 S (ref 12–15.5)
SCREEN APTT/NORMAL: 0.97
SCREEN APTT/NORMAL: NORMAL
SCREEN DRVVT/NORMAL: 1.05 %
THROMBIN TIME: NORMAL S

## 2025-03-10 LAB
CARDIOLIPIN IGG SER IA-ACNC: <9.4 GPL (ref 0–14.99)
CARDIOLIPIN IGM SER IA-ACNC: <9.4 MPL (ref 0–12.49)
F2 C.20210G>A GENO BLD/T: NEGATIVE
F5 P.R506Q BLD/T QL: NEGATIVE

## 2025-03-11 LAB
PROT C AG ACT/NOR PPP IA: 114 % (ref 72–160)
PROT S FREE AG ACT/NOR PPP IA: 74 % (ref 57–171)

## 2025-03-12 ENCOUNTER — OFFICE VISIT (OUTPATIENT)
Dept: PSYCHIATRY | Facility: CLINIC | Age: 40
End: 2025-03-12
Payer: COMMERCIAL

## 2025-03-12 VITALS
WEIGHT: 160.94 LBS | BODY MASS INDEX: 24.39 KG/M2 | HEIGHT: 68 IN | HEART RATE: 100 BPM | SYSTOLIC BLOOD PRESSURE: 116 MMHG | DIASTOLIC BLOOD PRESSURE: 83 MMHG

## 2025-03-12 DIAGNOSIS — F32.4 MAJOR DEPRESSIVE DISORDER WITH SINGLE EPISODE, IN PARTIAL REMISSION: Primary | ICD-10-CM

## 2025-03-12 DIAGNOSIS — F41.1 GENERALIZED ANXIETY DISORDER WITH PANIC ATTACKS: ICD-10-CM

## 2025-03-12 DIAGNOSIS — F40.10 SOCIAL ANXIETY DISORDER: ICD-10-CM

## 2025-03-12 DIAGNOSIS — F41.0 GENERALIZED ANXIETY DISORDER WITH PANIC ATTACKS: ICD-10-CM

## 2025-03-12 DIAGNOSIS — F90.0 ADHD (ATTENTION DEFICIT HYPERACTIVITY DISORDER), INATTENTIVE TYPE: ICD-10-CM

## 2025-03-12 PROCEDURE — 1160F RVW MEDS BY RX/DR IN RCRD: CPT | Mod: CPTII,S$GLB,, | Performed by: STUDENT IN AN ORGANIZED HEALTH CARE EDUCATION/TRAINING PROGRAM

## 2025-03-12 PROCEDURE — G2211 COMPLEX E/M VISIT ADD ON: HCPCS | Mod: S$GLB,,, | Performed by: STUDENT IN AN ORGANIZED HEALTH CARE EDUCATION/TRAINING PROGRAM

## 2025-03-12 PROCEDURE — 3008F BODY MASS INDEX DOCD: CPT | Mod: CPTII,S$GLB,, | Performed by: STUDENT IN AN ORGANIZED HEALTH CARE EDUCATION/TRAINING PROGRAM

## 2025-03-12 PROCEDURE — 99999 PR PBB SHADOW E&M-EST. PATIENT-LVL III: CPT | Mod: PBBFAC,,, | Performed by: STUDENT IN AN ORGANIZED HEALTH CARE EDUCATION/TRAINING PROGRAM

## 2025-03-12 PROCEDURE — 99214 OFFICE O/P EST MOD 30 MIN: CPT | Mod: S$GLB,,, | Performed by: STUDENT IN AN ORGANIZED HEALTH CARE EDUCATION/TRAINING PROGRAM

## 2025-03-12 PROCEDURE — 96136 PSYCL/NRPSYC TST PHY/QHP 1ST: CPT | Mod: 59,S$GLB,, | Performed by: STUDENT IN AN ORGANIZED HEALTH CARE EDUCATION/TRAINING PROGRAM

## 2025-03-12 PROCEDURE — 1159F MED LIST DOCD IN RCRD: CPT | Mod: CPTII,S$GLB,, | Performed by: STUDENT IN AN ORGANIZED HEALTH CARE EDUCATION/TRAINING PROGRAM

## 2025-03-12 PROCEDURE — 3074F SYST BP LT 130 MM HG: CPT | Mod: CPTII,S$GLB,, | Performed by: STUDENT IN AN ORGANIZED HEALTH CARE EDUCATION/TRAINING PROGRAM

## 2025-03-12 PROCEDURE — 3079F DIAST BP 80-89 MM HG: CPT | Mod: CPTII,S$GLB,, | Performed by: STUDENT IN AN ORGANIZED HEALTH CARE EDUCATION/TRAINING PROGRAM

## 2025-03-12 RX ORDER — PROPRANOLOL HYDROCHLORIDE 10 MG/1
10 TABLET ORAL 2 TIMES DAILY
Qty: 60 TABLET | Refills: 1 | Status: SHIPPED | OUTPATIENT
Start: 2025-03-12 | End: 2025-05-11

## 2025-03-12 RX ORDER — LISDEXAMFETAMINE DIMESYLATE 30 MG/1
30 CAPSULE ORAL DAILY
Qty: 30 CAPSULE | Refills: 0 | Status: SHIPPED | OUTPATIENT
Start: 2025-03-12 | End: 2025-04-11

## 2025-03-12 NOTE — PROGRESS NOTES
Outpatient Psychiatry Followup Visit  3/12/2025  Assessment & Plan    Impression     ICD-10-CM ICD-9-CM   1. Major depressive disorder with single episode, in partial remission  F32.4 296.25   2. Social anxiety disorder  F40.10 300.23   3. Generalized anxiety disorder with panic attacks  F41.1 300.02    F41.0 300.01   4. ADHD (attention deficit hyperactivity disorder), inattentive type  F90.0 314.00   5. BMI 24.0-24.9, adult  Z68.24 V85.1      Plan of Care & Medication Management    Chart was reviewed. The risks and benefits of medication were discussed with pt. The treatment plan and followup plan were reviewed with pt. Pt understands to contact clinic if symptoms worsen. Pt understands to call 911 or go to nearest ER for suicidal ideation, intent or plan. Unless otherwise specified, pt did NOT display signs of nor endorse symptoms of overt psychosis or acute mood disorder requiring hospitalization during the encounter; pt denied violent thoughts or suicidal or homicidal ideation, intent, or plan.   RX History ATARAX/VISTARIL (tachycardia), BUSPAR (panic attacks while taking), PROPRANOLOL, VYVANSE, ZOLOFT   Current RX Continue PROPRANOLOL  Adjustments:  12/11/2024: Start 10mg BID  Continue VYVANSE  Pt was provided [facility] educational material 1/29/2025  Monitor BP  Adjustments:  2/12/2025: Increase to 30mg qam  3/12/2025 ASRS 2/2 (11-24)  1/29/2025: Start 20mg qam  2/12/2025 ASRS 2/2 (10-23)  Prior to 1/29/2025 pt was taking ZOLOFT 75mg daily  1/29/2025 ASRS 4/3 (18-29)  Continue ZOLOFT  Adjustments:  1/29/2025: Reduce to 75mg daily  1/29/2025: Reports taking 75mg daily (100mg worsened anxiety and early AM awakenings)  12/11/2024: Increase to 100mg daily  10/30/2024: Increase to 75mg daily  9/24/2024: Continue ZOLOFT 50mg daily  Prior to 9/24/2024, pt had been taking ZOLOFT 50mg for 1 week      Education, Counseling & Monitoring []SLEEP HYGIENE  []THERAPY  []CONTRACT 3/12/2025?  [x] REVIEWED  "3/12/2025?  []NRT  []   Other Orders    RETURN L: STANDARD PROTOCOL: RETURN IN 4 WEEKS (ONE MONTH)       Subjective    Available documentation has been reviewed, and pertinent elements of the chart have been incorporated into this note where appropriate. Last Epic encounter with writer was on 2/12/2025   Yovani Melvin, a 39 y.o. male, presenting for followup visit. This visit was done in person, IN CLINIC.      Off blood thinner, may be off it for good; sees HEME    Labs reviewed    Likes VYVANSE increase  Fidgeting somewhat less  ASRS today, about the same    Pulse high  NO palpitations  Will continue to monitor    Anxiety is controlled, some brief anxiety in AM  NO panic attacks    Mood is stable  Denies depressed mood    Autism referral pending    Seems to be adherent to pharmacotherapy  Continue treatment as planned for now       Objective    Vitals:    03/12/25 1554   BP: 116/83   Pulse: 100   Weight: 73 kg (160 lb 15 oz)   Height: 5' 8" (1.727 m)     (Current body mass index is 24.47 kg/m².)    MSE/PE  1. Appearance: Dress is informal but appropriate. Motor activity normal.  2. Discourse: Clear speech with normal rate and volume. Associations intact. Orderly.  3. Emotional Expression: Euthymic mood with appropriate affect.  4. Perception and Thinking: No hallucinations. No suicidality, no homicidality, delusions, or paranoia.  5. Sensorium: Grossly intact. Able to focus for interview.  6. Memory and Fund of Knowledge: Intact for content of interview.  7. Insight and Judgment: Intact.       Measurement-Based Care (MBC):     Routine Instruments   PROMIS-ANXIETY Interpretation: 4 (4a raw score): T-SCORE 40.3; NONE TO SLIGHT using 55-60-70 cutoffs.   PROMIS-DEPRESSION Interpretation: 4 (4a raw score): T-SCORE 41.0; NONE TO SLIGHT using 55-60-70 cutoffs.   PSS4 Interpretation: 04/16; LOW using 6-11 cutoffs. 0 PH, 0 LSE.   Additional Instruments   MBC ANCHOR : a little better  ASRS Interpretation: ASRS A: 11/24 " "(STRATA 2); ASRS B: 24/48 (STRATA 2). Compared to last time of 2/2, severity is about the same.             Billing Documentation:     Method of Encounter IN PERSON visit at the clinic, established   E/M Code 03425: FOLLOW UP VISIT, Rx mgmt, "Multiple STABLE chronic illnesses"   Additional Codes and Modifiers     21230, with modifer 59: administered and scored more than one psychological or neuropsychological tests (see MBC above) (16+ mins)  , without modifiers -24,-25,-53: COMPLEXITY: Visit today included increased complexity associated with the care of the episodic problem(s) (multiple psychiatric disorders - see above) addressed and managing the longitudinal care of the patient due to the serious and/or complex managed problem(s) (multiple psychiatric disorders - see above).   Time N/A - Not billing for time        Mumtaz Stein DO  Department of Psychiatry, [facility]        "

## 2025-03-18 ENCOUNTER — TELEPHONE (OUTPATIENT)
Dept: HEMATOLOGY/ONCOLOGY | Facility: CLINIC | Age: 40
End: 2025-03-18
Payer: COMMERCIAL

## 2025-03-18 NOTE — TELEPHONE ENCOUNTER
Called pt and he states that he's not paying 40$ tomorrow. I instructed him to call the billing dept and told him we have nothing to do with that area. Pt thanked me for the info.     ----- Message from [place] sent at 3/18/2025  2:38 PM CDT -----  Contact: [phone]  Type:  Needs Medical AdviceWho Called: Yovani Symptoms (please be specific): n/a  How long has patient had these symptoms:  n/a Pharmacy name and phone #:  n/a Would the patient rather a call back or a response via [facility]? Call Back Best Call Back Number: [phone] Additional Information: pt is calling in regards to his upcoming virtual visit. Pt stated that he is being billed a copay of $40 and he should be billed $15. Pt stated that the confusion may be that his previous insurance was through [facility] [facility] and now it is which [email or web address] will like to know if anything can be done regarding the copay. Thanks KB

## 2025-03-19 ENCOUNTER — OFFICE VISIT (OUTPATIENT)
Dept: HEMATOLOGY/ONCOLOGY | Facility: CLINIC | Age: 40
End: 2025-03-19
Payer: COMMERCIAL

## 2025-03-19 DIAGNOSIS — F32.4 MAJOR DEPRESSIVE DISORDER WITH SINGLE EPISODE, IN PARTIAL REMISSION: Primary | ICD-10-CM

## 2025-03-19 PROCEDURE — 98006 SYNCH AUDIO-VIDEO EST MOD 30: CPT | Mod: 95,,, | Performed by: INTERNAL MEDICINE

## 2025-03-19 NOTE — PROGRESS NOTES
Subjective:    The patient location is:  home  Visit type: Virtual visit with synchronous audio and video  Face-to-face or time spent with patient on the encounter:25 min  Total time spent on and for  this encounter which includes non face-to-face time preparing to see patient, review of tests, obtaining and or reviewing separately obtained records documenting clinical information in the electronic or other health records, independently interpreting results which is not separately reported ,and communicating results to the patient/family/caregiver and in care coordination and treatment planning/communicating with pharmacy for prescriptions/addressing social needs/arranging follow-up and or referrals :25 min    Each patient I provide medical services by telemedicine is:  (1) informed of the relationship between the physician and patient and the respective role of any other health care provider with respect to management of the patient; and (2) notified that he or she may decline to receive medical services by telemedicine and may withdraw from such care at any time.  This is a video visit therefore some elements of the physical exam such as vital signs, heart sounds are breath sounds are not included and may be included if found in recent clinic notes of other providers assessing same patient. Any symptoms or signs that were visualized were stated by the patient may be included in this note.    Patient ID: Yovani Melvin is a 39 y.o. male.    Chief Complaint:  f/u    HPI  39-year-old with a history of kidney stones and left ureteral stent went to the emergency room July 2024 diagnosed with PE any complaint of chest pain started  Eliquis first went to one hospital 2 days later went again 7/21 told he had pe /7/24 was told no pE  Nofamily hx grandfather ?? Clot, had kidney stent 7/5 tooth pulled end of may 2024 was in bed for a few days after stent  Review of Systems   Constitutional:  Negative for appetite change and  unexpected weight change.   HENT:  Negative for mouth sores.    Eyes:  Negative for visual disturbance.   Respiratory:  Negative for cough and shortness of breath.    Cardiovascular:  Negative for chest pain.   Gastrointestinal:  Negative for abdominal pain and diarrhea.   Genitourinary:  Negative for frequency.   Musculoskeletal:  Negative for back pain.   Skin:  Negative for rash.   Neurological:  Negative for headaches.   Hematological:  Negative for adenopathy.   Psychiatric/Behavioral:  The patient is not nervous/anxious.                   Past Medical History:   Diagnosis Date    ADHD (attention deficit hyperactivity disorder)     Nephrolithiasis     Other pulmonary embolism without acute cor pulmonale      Past Surgical History:   Procedure Laterality Date    CYSTOURETEROSCOPY, WITH HOLMIUM LASER LITHOTRIPSY OF URETERAL CALCULUS AND STENT INSERTION Left 7/5/2024    Procedure: CYSTOURETEROSCOPY, WITH HOLMIUM LASER LITHOTRIPSY OF URETERAL CALCULUS AND STENT INSERTION;  Surgeon: Milana Encinas MD;  Location: [facility];  Service: Urology;  Laterality: Left;    URETEROSCOPIC REMOVAL OF URETERIC CALCULUS N/A 7/5/2024    Procedure: REMOVAL, CALCULUS, URETER, URETEROSCOPIC;  Surgeon: Milana Encinas MD;  Location: [facility];  Service: Urology;  Laterality: N/A;     Family History   Problem Relation Name Age of Onset    Migraines Mother      Hypertension Mother      Diabetes Father      Diabetes Paternal Aunt      Sarcoidosis Paternal Aunt      Diabetes Paternal Grandfather      Emphysema Maternal Grandfather      COPD Maternal Grandfather        Social History     Socioeconomic History    Marital status:    Tobacco Use    Smoking status: Never    Smokeless tobacco: Never   Substance and Sexual Activity    Alcohol use: Never    Drug use: Never     Social Drivers of Health     Financial Resource Strain: Patient Declined (2/18/2025)    Overall Financial Resource Strain (CARDIA)     Difficulty of Paying  Living Expenses: Patient declined   Food Insecurity: Patient Declined (2/18/2025)    Hunger Vital Sign     Worried About Running Out of Food in the Last Year: Patient declined     Ran Out of Food in the Last Year: Patient declined   Transportation Needs: No Transportation Needs (2/18/2025)    PRAPARE - Transportation     Lack of Transportation (Medical): No     Lack of Transportation (Non-Medical): No   Physical Activity: Unknown (2/18/2025)    Exercise Vital Sign     Days of Exercise per Week: 3 days     Minutes of Exercise per Session: Patient declined   Stress: No Stress Concern Present (2/18/2025)    [employer] - Occupational Stress Questionnaire     Feeling of Stress : Only a little   Housing Stability: High Risk (2/18/2025)    Housing Stability Vital Sign     Unable to Pay for Housing in the Last Year: Yes     Number of Times Moved in the Last Year: 1     Homeless in the Last Year: No     Review of patient's allergies indicates:   Allergen Reactions    Aspirin Palpitations and Shortness Of Breath    Hydrocodone     Penicillins        Current Outpatient Medications:     ketoconazole (NIZORAL) 2 % cream, Apply topically once daily., Disp: 60 g, Rfl: 1    ketoconazole (NIZORAL) 2 % shampoo, Apply topically twice a week., Disp: 120 mL, Rfl: 0    lisdexamfetamine (VYVANSE) 30 MG capsule, Take 1 capsule (30 mg total) by mouth once daily., Disp: 30 capsule, Rfl: 0    propranoloL (INDERAL) 10 MG tablet, Take 1 tablet (10 mg total) by mouth 2 (two) times daily., Disp: 60 tablet, Rfl: 1    sertraline (ZOLOFT) 25 MG tablet, Take 3 tablets (75 mg total) by mouth once daily., Disp: 90 tablet, Rfl: 2    Physical Exam    Wt Readings from Last 3 Encounters:   03/13/25 74.4 kg (164 lb)   09/13/24 71.9 kg (158 lb 8.2 oz)   09/03/24 71.7 kg (158 lb 1.6 oz)     Temp Readings from Last 3 Encounters:   03/13/25 99.2 °F (37.3 °C) (Oral)   09/13/24 96.8 °F (36 °C) (Temporal)   09/03/24 98 °F (36.7 °C)  (Oral)     BP Readings from Last 3 Encounters:   03/13/25 118/78   09/13/24 117/76   09/03/24 104/82     Pulse Readings from Last 3 Encounters:   03/13/25 85   09/13/24 80   09/03/24 73    VITAL SIGNS:  as above   GENERAL: appears well-built, well-nourished.  No anxiety, no agitation, and in no distress.  Patient is awake, alert, oriented and cooperative.  HEENT:  Showed no congestion. Trachea is central no obvious icterus or pallor noted no hoarseness. no obvious JVD   NECK:  Supple.  No JVD. No obvious cervical submental or supraclavicular adenopathy.  RS:the visualized portion of  Chest expands well. chest appears symmetric, no audible wheezes.  No dyspnea recognized  ABDOMEN:  abdomen appears undistended.  EXTREMITIES:  Without edema.  NEUROLOGICAL:  The patient is appropriate, higher functions are normal.  No  obvious neurological deficits.  normal judgement normal thought content  No confusion, no speech impediment. Cranial nerves are intact and show no deficit. No gross motor deficits noted   SKIN MUSCULOSKELETAL: no joint or skeletal deformity, no clubbing of nails.  No visible rash ecchymosis or petechiae     Lab Results   Component Value Date    WBC 4.99 03/19/2025    HGB 14.9 03/19/2025    HCT 44.3 03/19/2025    MCV 88 03/19/2025     03/19/2025       BMP  Lab Results   Component Value Date     03/19/2025    K 4.6 03/19/2025     03/19/2025    CO2 28 03/19/2025    BUN 12 03/19/2025    CREATININE 1.13 03/19/2025    CALCIUM 9.4 03/19/2025    ANIONGAP 6 (L) 03/19/2025    ESTGFRAFRICA >60 11/06/2019    EGFRNONAA >60 11/06/2019 July 21, 2024  Impression:     Probable small tertiary artery pulmonary emboli bilaterally.  Bilateral dependent atelectasis.  Small area of atelectasis in the left lingula.    July 23, 2024  Impression:     1.  Resolution of the tertiary pulmonary emboli in the lung bases.  No new pulmonary emboli.     2.  Atelectasis in lung bases and lingula.   2/25 no DVT and neg  CTA     Patient Active Problem List   Diagnosis    Ureteral stone    Acute cystitis with hematuria    Generalized anxiety disorder with panic attacks    Major depressive disorder with single episode, in partial remission    Social anxiety disorder    BMI 24.0-24.9, adult    ADHD (attention deficit hyperactivity disorder), inattentive type    BMI 25.0-25.9,adult    History of pulmonary embolus (PE)        Assessment and Plan     Possible PE diagnosed on CTA July 21, 2024 but showing resolution by July 23, 2024 ( he thinks he had a panic attack) this was post urethral stenting procedure  Repeat CTA September 20 24- for PE  had no dvt on doppler 7/24   Pt wanted to be very safe . Sowill stay on anticaog for total 6 months repeat usg and cta 2/25 neg   Dc anticoag entire hypercoagulable workup negative patient advised to contact me if there is a 2nd pulmonary embolus or DVT diagnosed discharged from clinic        Palpitation:refered  to cardiology for w/u  Kidensy stones and occ hematuria> stent is out    Anxiety attacks: sees psychaitry to help

## 2025-03-20 ENCOUNTER — PATIENT MESSAGE (OUTPATIENT)
Dept: PSYCHIATRY | Facility: CLINIC | Age: 40
End: 2025-03-20
Payer: COMMERCIAL

## 2025-03-24 ENCOUNTER — OFFICE VISIT (OUTPATIENT)
Dept: FAMILY MEDICINE | Facility: CLINIC | Age: 40
End: 2025-03-24
Payer: COMMERCIAL

## 2025-03-24 VITALS
HEIGHT: 68 IN | WEIGHT: 164.69 LBS | DIASTOLIC BLOOD PRESSURE: 84 MMHG | BODY MASS INDEX: 24.96 KG/M2 | TEMPERATURE: 99 F | HEART RATE: 98 BPM | SYSTOLIC BLOOD PRESSURE: 116 MMHG | OXYGEN SATURATION: 96 %

## 2025-03-24 DIAGNOSIS — Z20.818 STREP THROAT EXPOSURE: ICD-10-CM

## 2025-03-24 DIAGNOSIS — J10.1 INFLUENZA A VIRUS PRESENT: ICD-10-CM

## 2025-03-24 DIAGNOSIS — Z20.828 EXPOSURE TO INFLUENZA: ICD-10-CM

## 2025-03-24 DIAGNOSIS — J02.9 SORE THROAT: ICD-10-CM

## 2025-03-24 DIAGNOSIS — R05.1 ACUTE COUGH: Primary | ICD-10-CM

## 2025-03-24 LAB
CTP QC/QA: YES
MOLECULAR STREP A: NEGATIVE
POC MOLECULAR INFLUENZA A AGN: POSITIVE
POC MOLECULAR INFLUENZA B AGN: NEGATIVE
SARS-COV-2 RDRP RESP QL NAA+PROBE: NEGATIVE

## 2025-03-24 PROCEDURE — 99999 PR PBB SHADOW E&M-EST. PATIENT-LVL IV: CPT | Mod: PBBFAC,,, | Performed by: NURSE PRACTITIONER

## 2025-03-24 PROCEDURE — 87635 SARS-COV-2 COVID-19 AMP PRB: CPT | Mod: QW,S$GLB,, | Performed by: NURSE PRACTITIONER

## 2025-03-24 PROCEDURE — 1160F RVW MEDS BY RX/DR IN RCRD: CPT | Mod: CPTII,S$GLB,, | Performed by: NURSE PRACTITIONER

## 2025-03-24 PROCEDURE — 1159F MED LIST DOCD IN RCRD: CPT | Mod: CPTII,S$GLB,, | Performed by: NURSE PRACTITIONER

## 2025-03-24 PROCEDURE — 3074F SYST BP LT 130 MM HG: CPT | Mod: CPTII,S$GLB,, | Performed by: NURSE PRACTITIONER

## 2025-03-24 PROCEDURE — 99214 OFFICE O/P EST MOD 30 MIN: CPT | Mod: S$GLB,,, | Performed by: NURSE PRACTITIONER

## 2025-03-24 PROCEDURE — 3079F DIAST BP 80-89 MM HG: CPT | Mod: CPTII,S$GLB,, | Performed by: NURSE PRACTITIONER

## 2025-03-24 PROCEDURE — 3008F BODY MASS INDEX DOCD: CPT | Mod: CPTII,S$GLB,, | Performed by: NURSE PRACTITIONER

## 2025-03-24 PROCEDURE — 87502 INFLUENZA DNA AMP PROBE: CPT | Mod: QW,S$GLB,, | Performed by: NURSE PRACTITIONER

## 2025-03-24 PROCEDURE — 87651 STREP A DNA AMP PROBE: CPT | Mod: QW,S$GLB,, | Performed by: NURSE PRACTITIONER

## 2025-03-24 RX ORDER — PROMETHAZINE HYDROCHLORIDE AND DEXTROMETHORPHAN HYDROBROMIDE 6.25; 15 MG/5ML; MG/5ML
5 SYRUP ORAL EVERY 8 HOURS PRN
Qty: 120 ML | Refills: 0 | Status: SHIPPED | OUTPATIENT
Start: 2025-03-24

## 2025-03-24 RX ORDER — OSELTAMIVIR PHOSPHATE 75 MG/1
75 CAPSULE ORAL 2 TIMES DAILY
Qty: 10 CAPSULE | Refills: 0 | Status: SHIPPED | OUTPATIENT
Start: 2025-03-24 | End: 2025-03-29

## 2025-03-24 NOTE — PROGRESS NOTES
SUBJECTIVE:      Patient ID: Yovani Melvin is a 39 y.o. male.    Chief Complaint: Fever (Onset Saturday ), Generalized Body Aches, Chest Congestion, and Cough (Productive brownish/ yellowish )    History of Present Illness    CHIEF COMPLAINT:  Mr. Melvin presents with concerns of having the flu, reporting fever, body aches, chest tightness, and cough since Saturday.    HPI:  Mr. Melvin reports symptoms consistent with the flu since Saturday. The onset began with slight body aches during the day on Saturday, progressing to a fever of 101.3°F Saturday night into Sunday morning. He has had a fever ever since, which improves with ibuprofen but returns as the medication wears off. He also complains of body aches, chest tightness, and pain when coughing. A slight sore throat is reported. He has been alternating between ibuprofen and Tylenol to manage the fever. As of last night and this morning, he has been coughing up brownish mucus. He has severe chills when the fever spikes. He believes he may have contracted the flu from his boss, who was recently diagnosed with the illness. He also notes that his niece has strep throat, and he suspects his youngest child might have strep as well.    He denies ear pain and sinus pressure.    MEDICATIONS:  Mr. Melvin is on Ibuprofen and Tylenol (acetaminophen) as needed for fever, alternating between the two medications.    FAMILY HISTORY:  Family history is significant for the patient's niece with strep throat.    SOCIAL HISTORY:  Occupation: Employed        Review of Systems   Constitutional:  Positive for chills and fever. Negative for activity change, fatigue and unexpected weight change.   HENT:  Positive for congestion, postnasal drip and sore throat. Negative for ear pain, sinus pressure, trouble swallowing and voice change.    Eyes:  Negative for pain, discharge and visual disturbance.   Respiratory:  Positive for cough and chest tightness. Negative for shortness of breath  and wheezing.    Cardiovascular:  Negative for chest pain and palpitations.   Gastrointestinal:  Negative for abdominal pain, constipation, diarrhea, nausea and vomiting.   Genitourinary:  Negative for difficulty urinating, flank pain, frequency and urgency.   Musculoskeletal:  Positive for myalgias. Negative for back pain and joint swelling.   Skin:  Negative for color change and rash.   Neurological:  Negative for dizziness, seizures, syncope, weakness, numbness and headaches.   Hematological:  Negative for adenopathy.   Psychiatric/Behavioral:  Negative for dysphoric mood and sleep disturbance. The patient is not nervous/anxious.        Family History   Problem Relation Name Age of Onset    Migraines Mother      Hypertension Mother      Diabetes Father      Diabetes Paternal Aunt      Sarcoidosis Paternal Aunt      Diabetes Paternal Grandfather      Emphysema Maternal Grandfather      COPD Maternal Grandfather        Social History     Socioeconomic History    Marital status:    Tobacco Use    Smoking status: Never    Smokeless tobacco: Never   Substance and Sexual Activity    Alcohol use: Never    Drug use: Never     Social Drivers of Health     Financial Resource Strain: Patient Declined (2/18/2025)    Overall Financial Resource Strain (CARDIA)     Difficulty of Paying Living Expenses: Patient declined   Food Insecurity: Patient Declined (2/18/2025)    Hunger Vital Sign     Worried About Running Out of Food in the Last Year: Patient declined     Ran Out of Food in the Last Year: Patient declined   Transportation Needs: No Transportation Needs (2/18/2025)    PRAPARE - Transportation     Lack of Transportation (Medical): No     Lack of Transportation (Non-Medical): No   Physical Activity: Unknown (2/18/2025)    Exercise Vital Sign     Days of Exercise per Week: 3 days     Minutes of Exercise per Session: Patient declined   Stress: No Stress Concern Present (2/18/2025)    [employer] - Occupational Stress Questionnaire     Feeling of Stress : Only a little   Housing Stability: High Risk (2/18/2025)    Housing Stability Vital Sign     Unable to Pay for Housing in the Last Year: Yes     Number of Times Moved in the Last Year: 1     Homeless in the Last Year: No     Current Outpatient Medications   Medication Sig    ketoconazole (NIZORAL) 2 % cream Apply topically once daily.    ketoconazole (NIZORAL) 2 % shampoo Apply topically twice a week.    lisdexamfetamine (VYVANSE) 30 MG capsule Take 1 capsule (30 mg total) by mouth once daily.    propranoloL (INDERAL) 10 MG tablet Take 1 tablet (10 mg total) by mouth 2 (two) times daily.    sertraline (ZOLOFT) 25 MG tablet Take 3 tablets (75 mg total) by mouth once daily.    oseltamivir (TAMIFLU) 75 MG capsule Take 1 capsule (75 mg total) by mouth 2 (two) times daily. for 5 days    promethazine-dextromethorphan (PROMETHAZINE-DM) 6.25-15 mg/5 mL Syrp Take 5 mLs by mouth every 8 (eight) hours as needed (cough).   Last reviewed on 3/13/2025  1:38 PM by Esthela English DO    Review of patient's allergies indicates:   Allergen Reactions    Aspirin Palpitations and Shortness Of Breath    Hydrocodone     Penicillins       Past Medical History:   Diagnosis Date    ADHD (attention deficit hyperactivity disorder)     Nephrolithiasis     Other pulmonary embolism without acute cor pulmonale      Past Surgical History:   Procedure Laterality Date    CYSTOURETEROSCOPY, WITH HOLMIUM LASER LITHOTRIPSY OF URETERAL CALCULUS AND STENT INSERTION Left 7/5/2024    Procedure: CYSTOURETEROSCOPY, WITH HOLMIUM LASER LITHOTRIPSY OF URETERAL CALCULUS AND STENT INSERTION;  Surgeon: Milana Encinas MD;  Location: [facility] OR;  Service: Urology;  Laterality: Left;    URETEROSCOPIC REMOVAL OF URETERIC CALCULUS N/A 7/5/2024    Procedure: REMOVAL, CALCULUS, URETER, URETEROSCOPIC;  Surgeon: Milana Encinas MD;  Location: [facility] OR;  Service: Urology;  Laterality: N/A;  "         OBJECTIVE:      Vitals:    03/24/25 0845   BP: 116/84   BP Location: Left arm   Patient Position: Sitting   Pulse: 98   Temp: 99.1 °F (37.3 °C)   TempSrc: Oral   SpO2: 96%   Weight: 74.7 kg (164 lb 10.9 oz)   Height: 5' 8" (1.727 m)     Physical Exam  Vitals and nursing note reviewed.   Constitutional:       General: He is awake. He is not in acute distress.     Appearance: He is well-developed, well-groomed and overweight. He is ill-appearing. He is not toxic-appearing or diaphoretic.   HENT:      Head: Normocephalic and atraumatic.      Right Ear: Tympanic membrane, ear canal and external ear normal.      Left Ear: Tympanic membrane, ear canal and external ear normal.      Nose: Nose normal.      Mouth/Throat:      Comments: Wearing mask  Eyes:      General: Lids are normal. Gaze aligned appropriately.      Conjunctiva/sclera: Conjunctivae normal.      Right eye: Right conjunctiva is not injected.      Left eye: Left conjunctiva is not injected.      Pupils: Pupils are equal, round, and reactive to light.   Cardiovascular:      Rate and Rhythm: Normal rate and regular rhythm.      Pulses: Normal pulses.      Heart sounds: Normal heart sounds, S1 normal and S2 normal. No murmur heard.     No friction rub. No gallop.   Pulmonary:      Effort: Pulmonary effort is normal. No respiratory distress.      Breath sounds: Normal breath sounds. No stridor. No decreased breath sounds, wheezing, rhonchi or rales.   Chest:      Chest wall: No tenderness.   Musculoskeletal:      Cervical back: Neck supple.      Right lower leg: No edema.      Left lower leg: No edema.   Lymphadenopathy:      Cervical: No cervical adenopathy.   Skin:     General: Skin is warm and dry.      Capillary Refill: Capillary refill takes less than 2 seconds.      Findings: No erythema or rash.   Neurological:      Mental Status: He is alert and oriented to person, place, and time. Mental status is at baseline.   Psychiatric:         Attention " and Perception: Attention normal.         Mood and Affect: Mood normal.         Speech: Speech normal.         Behavior: Behavior normal. Behavior is cooperative.         Thought Content: Thought content normal.         Judgment: Judgment normal.       Office Visit on 03/24/2025   Component Date Value Ref Range Status    POC Molecular Influenza A Ag 03/24/2025 Positive (A)  Negative Final    POC Molecular Influenza B Ag 03/24/2025 Negative  Negative Final     Acceptable 03/24/2025 Yes   Final    POC Rapid COVID 03/24/2025 Negative  Negative Final     Acceptable 03/24/2025 Yes   Final    Molecular Strep A, POC 03/24/2025 Negative  Negative Final     Acceptable 03/24/2025 Yes   Final          Assessment:       1. Acute cough    2. Sore throat    3. Strep throat exposure    4. Exposure to influenza    5. Influenza A virus present        Plan:       Assessment & Plan    PLAN SUMMARY:  - Performed rapid strep test (negative result)  - Diagnosed with influenza A based on symptoms and positive rapid influenza test  - Prescribed Tamiflu for 5 days for treatment of influenza A  - Prescribed cough syrup as needed for symptomatic relief  - Advised to alternate Tylenol and ibuprofen as needed for fever management  - Provided work excuse note through Wednesday    INFLUENZA A:  - Diagnosed influenza A based on symptoms and positive rapid influenza test.  - Assessed respiratory status, noting clear lung sounds on auscultation.  - Educated the patient about the typical duration of influenza symptoms and criteria for safe return to work.  - Explained that being afebrile for 24 hours without antipyretics indicates reduced infectivity.  - Prescribed Tamiflu for 5 days for treatment of influenza A.  - Provided work excuse note through Wednesday.  - Symptomatic care discussed, list of OTC medications to take provided at discharge.  - Instructed the patient to contact the office if fever  persists beyond Wednesday.    FEVER:  - Advised to continue alternating Tylenol and ibuprofen as needed for fever management.  - Mr. Melvin reports fevers since Saturday night, with a peak of 101.3°F on Sunday morning.  - Fever recurs when antipyretic medication wears off.    COUGH:  - Prescribed cough syrup as needed for symptomatic relief.  - Mr. Melvin reports a painful cough with production of brownish sputum.    FAMILY HISTORY OF STREP THROAT:  - Considered strep throat due to family member exposure, but ruled out with negative rapid strep test.  - Performed rapid strep test.  - Mr. Melvin mentions that their niece has strep throat, and they suspect their youngest child might have it as well.        Acute cough  -     POCT Influenza A/B Molecular  -     POCT COVID-19 Rapid Screening  -     promethazine-dextromethorphan (PROMETHAZINE-DM) 6.25-15 mg/5 mL Syrp; Take 5 mLs by mouth every 8 (eight) hours as needed (cough).  Dispense: 120 mL; Refill: 0    Sore throat  -     POCT Strep A, Molecular    Strep throat exposure    Exposure to influenza    Influenza A virus present  -     oseltamivir (TAMIFLU) 75 MG capsule; Take 1 capsule (75 mg total) by mouth 2 (two) times daily. for 5 days  Dispense: 10 capsule; Refill: 0  -     promethazine-dextromethorphan (PROMETHAZINE-DM) 6.25-15 mg/5 mL Syrp; Take 5 mLs by mouth every 8 (eight) hours as needed (cough).  Dispense: 120 mL; Refill: 0    I spent a total of 22 minutes on the day of the visit.This includes face to face time and non-face to face time preparing to see the patient (eg, review of tests), obtaining and/or reviewing separately obtained history, documenting clinical information in the electronic or other health record, independently interpreting results and communicating results to the patient/family/caregiver, or care coordinator.    Follow up if symptoms worsen or fail to improve.          3/24/2025 JASON Rodas, FNP    This note was generated  with the assistance of ambient listening technology. Verbal consent was obtained by the patient and accompanying visitor(s) for the recording of patient appointment to facilitate this note. I attest to having reviewed and edited the generated note for accuracy, though some syntax or spelling errors may persist. Please contact the author of this note for any clarification.

## 2025-03-24 NOTE — LETTER
March 24, 2025      [facility] - [address]  [address]  [address]  [facility] [address]  Phone: [phone]  Fax: [phone]       Patient: Yovani Melvin   YOB: 1985  Date of Visit: 03/24/2025    To Whom It May Concern:    Alesia Melvin  was at [facility] on 03/24/2025. He may return to work/school on 3/26/2025 with no restrictions. If you have any questions or concerns, or if I can be of further assistance, please do not hesitate to contact me.    Sincerely,      Anali Moody LPN

## 2025-04-16 ENCOUNTER — OFFICE VISIT (OUTPATIENT)
Dept: PSYCHIATRY | Facility: CLINIC | Age: 40
End: 2025-04-16
Payer: COMMERCIAL

## 2025-04-16 ENCOUNTER — PATIENT MESSAGE (OUTPATIENT)
Dept: PSYCHIATRY | Facility: CLINIC | Age: 40
End: 2025-04-16
Payer: COMMERCIAL

## 2025-04-16 VITALS
SYSTOLIC BLOOD PRESSURE: 120 MMHG | BODY MASS INDEX: 24.4 KG/M2 | DIASTOLIC BLOOD PRESSURE: 84 MMHG | HEART RATE: 80 BPM | WEIGHT: 160.5 LBS

## 2025-04-16 DIAGNOSIS — F32.4 MAJOR DEPRESSIVE DISORDER WITH SINGLE EPISODE, IN PARTIAL REMISSION: Primary | ICD-10-CM

## 2025-04-16 DIAGNOSIS — F90.0 ADHD (ATTENTION DEFICIT HYPERACTIVITY DISORDER), INATTENTIVE TYPE: ICD-10-CM

## 2025-04-16 DIAGNOSIS — F41.1 GENERALIZED ANXIETY DISORDER WITH PANIC ATTACKS: ICD-10-CM

## 2025-04-16 DIAGNOSIS — F41.0 GENERALIZED ANXIETY DISORDER WITH PANIC ATTACKS: ICD-10-CM

## 2025-04-16 DIAGNOSIS — F95.1 CHRONIC MOTOR TIC DISORDER: ICD-10-CM

## 2025-04-16 DIAGNOSIS — F40.10 SOCIAL ANXIETY DISORDER: ICD-10-CM

## 2025-04-16 PROCEDURE — 99999 PR PBB SHADOW E&M-EST. PATIENT-LVL III: CPT | Mod: PBBFAC,,, | Performed by: STUDENT IN AN ORGANIZED HEALTH CARE EDUCATION/TRAINING PROGRAM

## 2025-04-16 RX ORDER — PROPRANOLOL HYDROCHLORIDE 10 MG/1
10 TABLET ORAL 2 TIMES DAILY
Qty: 60 TABLET | Refills: 1 | Status: SHIPPED | OUTPATIENT
Start: 2025-04-16 | End: 2025-06-15

## 2025-04-16 RX ORDER — LISDEXAMFETAMINE DIMESYLATE 30 MG/1
30 CAPSULE ORAL DAILY
Qty: 30 CAPSULE | Refills: 0 | Status: SHIPPED | OUTPATIENT
Start: 2025-04-16 | End: 2025-05-16

## 2025-04-16 RX ORDER — GUANFACINE 1 MG/1
1 TABLET, EXTENDED RELEASE ORAL NIGHTLY
Qty: 30 TABLET | Refills: 1 | Status: SHIPPED | OUTPATIENT
Start: 2025-04-16

## 2025-04-16 NOTE — TELEPHONE ENCOUNTER
Contacted pharmacy and advised to fill as prescribed. Pt notified via telephone Rx fill is in process.

## 2025-04-16 NOTE — PROGRESS NOTES
Outpatient Psychiatry Followup Visit  4/16/2025  Assessment & Plan    Impression     ICD-10-CM ICD-9-CM   1. Major depressive disorder with single episode, in partial remission  F32.4 296.25   2. Social anxiety disorder  F40.10 300.23   3. Chronic motor tic disorder  F95.1 307.22   4. ADHD (attention deficit hyperactivity disorder), inattentive type  F90.0 314.00   5. Generalized anxiety disorder with panic attacks  F41.1 300.02    F41.0 300.01   6. BMI 24.0-24.9, adult  Z68.24 V85.1      Plan of Care & Medication Management    Chart was reviewed. The risks and benefits of medication were discussed with pt. The treatment plan and followup plan were reviewed with pt. Pt understands to contact clinic if symptoms worsen. Pt understands to call 911 or go to nearest ER for suicidal ideation, intent or plan. Unless otherwise specified, pt did NOT display signs of nor endorse symptoms of overt psychosis or acute mood disorder requiring hospitalization during the encounter; pt denied violent thoughts or suicidal or homicidal ideation, intent, or plan.   RX History ATARAX/VISTARIL (tachycardia), BUSPAR (panic attacks while taking), PROPRANOLOL, VYVANSE, ZOLOFT   Current RX Start INTUNIV  Pt was provided [facility] educational material 4/16/2025  Adjustments:  4/16/2025: Start 1mg HS  Continue PROPRANOLOL  Adjustments:  12/11/2024: Start 10mg BID  Continue VYVANSE  Pt was provided [facility] educational material 1/29/2025  Monitor BP  Adjustments:  2/12/2025: Increase to 30mg qam  3/12/2025 ASRS 2/2 (11-24)  1/29/2025: Start 20mg qam  2/12/2025 ASRS 2/2 (10-23)  Prior to 1/29/2025 pt was taking ZOLOFT 75mg daily  1/29/2025 ASRS 4/3 (18-29)  Continue ZOLOFT  Adjustments:  1/29/2025: Reduce to 75mg daily  1/29/2025: Reports taking 75mg daily (100mg worsened anxiety and early AM awakenings)  12/11/2024: Increase to 100mg daily  10/30/2024: Increase to 75mg daily  9/24/2024: Continue ZOLOFT 50mg daily  Prior to 9/24/2024, pt had been  taking ZOLOFT 50mg for 1 week      Education, Counseling & Monitoring []SLEEP HYGIENE  []THERAPY  []CONTRACT 4/16/2025?  [] REVIEWED 4/16/2025?  []NRT  []   Other Orders    RETURN M: STANDARD PROTOCOL: RETURN IN 4 WEEKS (ONE MONTH)       Subjective    Available documentation has been reviewed, and pertinent elements of the chart have been incorporated into this note where appropriate. Last Epic encounter with writer was on 3/12/2025   Yovani Melvin, a 40 y.o. male, presenting for followup visit. This visit was done in person, IN CLINIC.      Reports since last visit noticing tics more often, especially at work  Sniffing, blowing  Tensing LE muscles  Closing eyes  Reports onset in childhood, usually NOT bothersome, but over last month or two have become annoying  Pt associated with VYVANSE    Mood is stable  Denies depressed mood  Anxiety is controlled  Social anxiety is improving, avoiding people less - reports was able to talk with people across the street    Completed [facility] survey questions.    Seems to be adherent to pharmacotherapy   Discussed adding GUANFACINE  Continue treatment otherwise as planned        Objective    Vitals:    04/16/25 0955   BP: 120/84   Pulse: 80   Weight: 72.8 kg (160 lb 7.9 oz)     (Current body mass index is 24.4 kg/m².)    MSE/PE  1. Appearance: Dress is informal but appropriate. Motor activity normal.  2. Discourse: Clear speech with normal rate and volume. Associations intact. Orderly.  3. Emotional Expression: Somewhat anxious.  4. Perception and Thinking: No hallucinations. No suicidality, no homicidality, delusions, or paranoia.  5. Sensorium: Grossly intact. Able to focus for interview.  6. Memory and Fund of Knowledge: Intact for content of interview.  7. Insight and Judgment: Intact.       Measurement-Based Care (MBC):     Routine Instruments   PROMIS-ANXIETY Interpretation: 4 (4a raw score): T-SCORE 40.3; NONE TO SLIGHT using 55-60-70 cutoffs.   PROMIS-DEPRESSION  "Interpretation: 4 (4a raw score): T-SCORE 41.0; NONE TO SLIGHT using 55-60-70 cutoffs.   PSS4 Interpretation: 400: Low stress appraisal. 0 PH, 0 LSE.   Additional Instruments   MBC ANCHOR : about the same  ASRS Interpretation: Not completed this visit.             Billing Documentation:     Method of Encounter IN PERSON visit at the clinic, established   E/M Code 34061: FOLLOW UP VISIT, Rx mgmt, "Multiple STABLE chronic illnesses"   Additional Codes and Modifiers     21849, with modifer 59: administered and scored more than one psychological or neuropsychological tests (see MBC above) (16+ mins)  , with modifier 33: administered and scored social determinants of health (5-15 mins)  , without modifiers -24,-25,-53: COMPLEXITY: Visit today included increased complexity associated with the care of the episodic problem(s) (multiple psychiatric disorders - see above) addressed and managing the longitudinal care of the patient due to the serious and/or complex managed problem(s) (multiple psychiatric disorders - see above).   Time         Mumtaz Stein DO  Department of Psychiatry, [facility]        "

## 2025-04-16 NOTE — PATIENT INSTRUCTIONS
[place] ambulatory encounter  URGENT CARE OFFICE VISIT    SUBJECTIVE:  Toshia Duran is a 24 year old female who presented requesting evaluation for pelvic cramps.    Last month at , had UTI growing lactobacillus (UA was normal though), treated with macrobid then changed to keflex.    One week after finishing abx, noted white vaginal discharge (not there prior) and vaginal odor. She also noted dysuria, along with pelvic cramps. This has been going on for about 2 weeks, and today finally came in. Interestingly, also having back pain, did have a massage several days prior. No fevers or chills though, some nausea, but no vomiting, chest pain, hematuria. LMP beginning of month. Not sexually active.     Review of systems:    A review of systems was performed and findings relevant to these symptoms are included in the HPI.     PAST HISTORIES:    ALLERGIES:   Allergen Reactions   • Codeine Sulfate GI UPSET   • Dog Dander    • Dust    • Grass    • Mold   (Environmental)        MEDICATIONS:  Current Outpatient Medications   Medication Sig   • buPROPion (WELLBUTRIN XL) 300 MG 24 hr tablet Take 1 pill in the am   • traZODone (DESYREL) 50 MG tablet Take 1/2 or 1 pill about 30 minutes before bed as needed for insomnia   • ALPRAZolam (XANAX) 0.5 MG tablet Take 1/2 or 1 pill daily as directed for anxiety or insomnia   • lamoTRIgine (LAMICTAL) 150 MG tablet Take 1/2 pill twice daily   • hydrOXYzine (ATARAX) 25 MG tablet Take 1 or 2 tabs up to 2 times daily as needed for anxiety   • ESTARYLLA 0.25-35 MG-MCG per tablet TAKE 1 TABLET BY MOUTH ONCE DAILY   • norgestimate-ethinyl estradiol (ESTARYLLA) 0.25-35 MG-MCG per tablet Take 1 tablet by mouth daily.   • albuterol 108 (90 Base) MCG/ACT inhaler Inhale 2 puffs into the lungs every 4 hours as needed for Shortness of Breath or Wheezing.   • fluconazole (DIFLUCAN) 150 MG tablet Take 1 tablet by mouth 1 time for 1 dose.     No current facility-administered medications for this  Start GUANFACINE 1mg HS  Continue other medicine as prescribed    visit.            Past Medical History:   Diagnosis Date   • Allergic rhinitis due to other allergen    • Anxiety    • Depression    • Ovarian torsion 3/13   • Reflux esophagitis    • Unspecified asthma(493.90)     Asthma     Past Surgical History:   Procedure Laterality Date   • Gynecologic cryosurgery     • Lap, w/removal tube/ovary & adenexal struc  3/12/2013    torsion of right ovary     Social History     Socioeconomic History   • Marital status: Single     Spouse name: None   • Number of children: 0   • Years of education: None   • Highest education level: None   Social Needs   • Financial resource strain: None   • Food insecurity - worry: None   • Food insecurity - inability: None   • Transportation needs - medical: None   • Transportation needs - non-medical: None   Occupational History   • Occupation: Student     Comment: [employer]   Tobacco Use   • Smoking status: Never Smoker   • Smokeless tobacco: Never Used   Substance and Sexual Activity   • Alcohol use: Yes     Alcohol/week: 0.0 - 1.2 oz   • Drug use: No   • Sexual activity: No   Other Topics Concern   • None   Social History Narrative    Other pts        Jesusita Edmondsspencer-friend        Cici Edmondsspencer-friend        [employer]        10 grade            [employer] E[employer]-sinus 1/12/10     Social History     Tobacco Use   Smoking Status Never Smoker   Smokeless Tobacco Never Used     Social History     Substance and Sexual Activity   Alcohol Use Yes   • Alcohol/week: 0.0 - 1.2 oz     Family History   Problem Relation Age of Onset   • Hypertension Mother    • Hypertension Father    • Other Father         kidney stones   • Hypertension Maternal Grandmother    • NEGATIVE FAMILY HX OF Brother    • Cancer Other         breast-great aunt       OBJECTIVE:  Physical Exam:    Visit Vitals  /80 (BP Location: [facility], Patient Position: Sitting, Cuff Size: Regular)   Pulse 68   Temp 98.7 °F (37.1 °C) (Temporal Artery)   Resp 12   Ht 5' 4\" (1.626 m)   Wt 69.9 kg   LMP  01/01/2019 (Exact Date)   BMI 26.47 kg/m²        CONSTITUTIONAL: Well-hydrated, well-nourished female who appears to be in no acute distress. Awake, alert and cooperative.  LUNGS: Clear to auscultation bilaterally. No wheezes, rales or rhonchi noted.   CARDIOVASCULAR: Regular rate and rhythm with normal S1 and S2. There are no murmurs auscultated.   ABDOMEN: Soft and non-tender. No masses. No liver or spleen enlargement. Bowel sounds normal.   MUSCULOSKELETAL: no flank tenderness  SKIN: Warm, dry, intact without rash or lesion.  NEUROLOGIC: Alert and oriented x3.  PSYCHIATRIC:  Speech and behavior appropriate. Normal mood and affect.    DIAGNOSTICS:  UA normal    Assessment:  1.  (genitourinary) symptoms    2. Candida vaginitis          PLAN:  Orders Placed This Encounter   • Urinalysis Dipstick Only / Back Office   • fluconazole (DIFLUCAN) 150 MG tablet     UA unremarkable. Pelvic exam deferred by patient at this time, but based on history, seems like after receiving abx, she developed a yeast infection after that, with the burning, odor and whitish discharge. Will trial diflucan 150 mg x 1 at this time. She is to call if sxs persists later this week, and if she has fevers, chills, nausea, vomiting, worsening back pain.    FOLLOW-UP:  prn    PATIENT INSTRUCTIONS:      The patient was advised to follow up with primary physician or to recheck with the urgent care clinic sooner if symptoms get worse or if new symptoms appear.    The patient indicated understanding of the diagnosis and agreed with the plan of care.

## 2025-04-23 ENCOUNTER — OFFICE VISIT (OUTPATIENT)
Dept: PSYCHIATRY | Facility: CLINIC | Age: 40
End: 2025-04-23
Payer: COMMERCIAL

## 2025-04-23 DIAGNOSIS — F41.1 GENERALIZED ANXIETY DISORDER WITH PANIC ATTACKS: ICD-10-CM

## 2025-04-23 DIAGNOSIS — F41.0 GENERALIZED ANXIETY DISORDER WITH PANIC ATTACKS: ICD-10-CM

## 2025-04-23 DIAGNOSIS — F90.0 ADHD (ATTENTION DEFICIT HYPERACTIVITY DISORDER), INATTENTIVE TYPE: Primary | ICD-10-CM

## 2025-04-23 DIAGNOSIS — Z13.41 ENCOUNTER FOR AUTISM SCREENING: ICD-10-CM

## 2025-04-23 DIAGNOSIS — F32.4 MAJOR DEPRESSIVE DISORDER WITH SINGLE EPISODE, IN PARTIAL REMISSION: ICD-10-CM

## 2025-04-23 PROCEDURE — 99999 PR PBB SHADOW E&M-EST. PATIENT-LVL I: CPT | Mod: PBBFAC,,, | Performed by: PSYCHOLOGIST

## 2025-04-23 PROCEDURE — 90791 PSYCH DIAGNOSTIC EVALUATION: CPT | Mod: S$GLB,,, | Performed by: PSYCHOLOGIST

## 2025-04-23 NOTE — PROGRESS NOTES
Initial Intake Appointment    Name: Yovani Melvin YOB: 1985   Collateral Name: [unfilled] Age: 40 y.o.   Date(s) of Assessment: 2025 Gender: Male   Patient Email: [email or web address]   Examiner: Nathaniel Garces, PhD Collateral Email: [email or web address]     PLAN/ Pre-Authorization Request  Purpose for evaluation: Yovani Melvin was referred for a psychological evaluation to determine and clarify the diagnosis in order to inform treatment recommendations and access to community resources     Previous Diagnosis: Social anxiety disorder, ADHD, major depressive disorder    Diagnosis/Diagnoses to Rule-Out: Autism Spectrum Disorder F84.0      Measures Requested:  Aranda Brief Intelligence Test, 2nd Edition (KBIT-2)   Autism Diagnostic Observation Schedule, Second Edition (ADOS 2)  Adaptive Behavior Assessment System, Third Edition (ABAS 3) adult report  Social Responsiveness Scale, Second Edition (SRS-2)     CPT Requested and units:     94455  = 60 minutes , 65335 (3) = 180 minutes, 49039 = 30 minutes, 89273 (4)  = 120 minutes, 65374     Total Time: 390 minutes      Is Feedback requested:    Yes, [id number]     Please read below for further information regarding need for evaluation.  Information includes developmental and medical history, previous evaluations and therapies, and functioning across environments (home/work/school/community).  ________________________________________________________________  LENGTH OF SESSION: 90 mins    Billin (initial diagnostic interview)    Consent: the patient expressed an understanding of the purpose of the initial diagnostic interview and consented to all procedures.    The patient location is:  Clinic     Visit type: Face to Face    IDENTIFYING INFORMATION    Yovani Melvin is a 40 y.o. male who lives with his wife and children in [place], [place] and has a history of social anxiety disorder, major depressive disorder, and ADHD.  Yovani was referred to [facility]  "Department of Psychiatry/Behavioral Health  by Mumtaz Stein DO due to concerns relating to a possible diagnosis of autism spectrum disorder.  According to Yovani, concerns began in early childhood.  Yovani Melvin is seeking a developmental/comprehension evaluation in order to clarify the diagnosis and inform treatment recommendations. This psychological evaluation should be considered along with the other sources of information.                                                                                                                  BACKGROUND HISTORY    PARENT INTERVIEW  Patient and spouse attended the intake session and provided the following information.    BACKGROUND HISTORY    Birth History: Yovani was born via Normal. Yovani was born via an uncomplicated birth.    Developmental History: Yovani met all developmental milestones within typical limits.. Yovani walked on time. Yovani talked on time. Yovani did not present a regression in skills. He stated that, starting at a few months old, sudden or loud noises, would "drive [him] crazy and cause him to cry excessively." Mother thought he was the shiest kid ever. In retrospect, mother thinks it was overstimulation. If he did not know something, he would have a meltdown. He was homeschooled and mother would pass him on things without him learning. Pt stated he can recall memories as early as 1.5 years. He was "super shy" and would not leave mother's side. Pt would organize toys by color instead of playing with them. Pt states he was and is that person who is fine by himself. Pt met wife at [employer]. Joined the drama team to get closer to wife. Poor eye contact as a child. He states he still struggles with eye contact a lot. "It really bothers me." He reported strong empathy with animals as a child and currently. Pt states he is drawn more to outcasts because he has been outcasted and can get along well. Gets along with kids, babies, and animals well. Can't hold a " conversation with peers well. Not engaged in extracurriculars as a child. Prefers to sit back and watch. Pt would spin all the time as a kid because it was fun and felt good. He reported constant leg shaking. Pt had to have his legs moving in Taoism. Being still physically hurt. Would ask mother to put him to bed between age 1-3.     Previous or Current Evaluations: Yovani was never evaluated for autism. He was never engaged in occupational, MARCEL, speech, or physical therapy. He has several cousins who have autism. He was diagnosed with ADHD with Jose. Started counseling in June 2024 when panic attacks worsened. Therapist thinks he may have dysgraphia and a processing disorder. Pt stated he has a hard time answering a question right away. Creates an issue at work. Terrible handwriting creates a problem as well. Pt's therapist recommended ASD testing.     Medical History: Yovani does not have a history of serious medical issues or surgeries. He reports however, he hospitalized some for childhood illnesses. During his early childhood, Yovani did not have a significant history of ear infections. Yovani did not have PE Tubes.    Academic Functioning: Yovani does have a history of learning difficulties that were never diagnosed as learning disorders. His mother passed him along in classes despite not learning or passing test. He was homeschooled his entire life. His therapist believes he has an auditory processing disorder and dysgraphia.    Emotional Functioning: Yovani does not have a history of talking about or attempting to hurt him/herself or anyone else. Yovani Melvin reports a history of anxiety and depression. Pt reports depression has improved significantly with therapy and med management.    Social History: He lives at home with his wife and two children (11 and 6 y/o). He reported positive relationships with his wife and children. Intimacy is sometimes a struggle because of his needs for physical space. The relationship  "between Yovani and his siblings was "very good" in childhood because we had to take care of each other the whole time. Mother "faded out later in life and shut herself in her room or was off with a friend." Relationship with father was limited because he worked over 60 hours a week. The relationship between Yovani and his mother is good now. Father  in 2019. Relationship with father was "really good" before he . Relationship was distant in childhood. Yovani does not have a history of experiencing bullying. He was picked on by his Jainism friends and adults nagging him for being shy or "too skinny." Yovani mostly spent time with his cousins and had a childhood friend briefly from [facility].    Behavioral Functioning: Yovani Melvin reported that presented with the following behaviors in childhood: Emotional Outbursts or breakdowns if struggling to understand him. He stated he was dependent on mother much of the time. Pt denied defiant behaviors. He reported childhood and current "demand avoidance." Discussed his focus on logical reasoning as related to his refusal to raise hand in Jainism and his willingness to forgive mother.    ---------------------------------------------------------------------------------------------------------    Adult Autism Spectrum Disorder Structured Diagnostic Interview      INTRODUCTION (How an ASD diagnosis will be used and what difference it will make to subject's life. Identify current problems and their severity).  Tell me what this assessment might achieve for you? - what it might bring about - For myself - for confirmation of all the years I've dealt with this stuff. It's not just me.  Do you think (or feel) that you're different to other people in any way?  Have you ever worked - in a paid job or as a volunteer? Do you work now? Have you had any difficulties at work?  A: RECIPROCAL SOCIAL COMMUNICATION AND SOCIAL INTERACTION (The ability to relate to people, to appreciate where " they are coming from, to  social cues and to make and maintain friendships. Dependent on nonverbal as well as verbal skills. It is the ability to read intuitively (rather than to work out) what others are feeling, thinking, and intending. Distinguish this from a lack of concern for other (as in personality disorder).  DEFICITS IN SOCIAL-EMOTIONAL RECIPROCITY (the skills needed to approach, respond to, and interact with others which show in, for example, conversation and in sharing emotions and interests.)  Do you enjoy mixing with people? - I do not enjoy groups. I do try to avoid it a lot. Sometimes push myself to do it. Do it for people pleasing at times.  Do you enjoy informal social gatherings? (4-10 people - a family gathering)? What do you like to do during them? - Play with the kids. If it's family I know, I can cut up and joke with them. If it's people I don't know, I mask. It feels like work.  Do you like the informal, social gossip at these gatherings? - Doesn't like gossip.  Do you like the small talk? - Very hard for me. Unless I know this person and know what I can talk about. Doesn't like to speak unless he absolutely has something to say.  Making jokes with others? - Likes sarcasm. Family would do that all the time.  How good are you at judging what to say or do in these settings? - Not good. My brain goes back and forth about what to say and not what to say or do.  How has it been in the past - for example, what did you do during break-times at school?   Did you play with all the others, stick to a very small group, or just be on your own, avoiding people? (Were you a loner?) - Loner unless you were my cousin or sibling.  What about the social side of work? -  at [employer].  Majority of them are ADHD, and everybody interrupts anyway. Talk about personal lives some.  Would you meet up after work? - No. A gathering at the owner's house. Didn't want to go, but pt knew  everyone there.  Do you sometimes find that something you've said has upset people - and you don't understand why? - Yes. I mean well, but it don't come out that way all the time.   How good are you at calming people you're talking to if they become upset? - He is not the outward comforter. Does not show emotion. Did not comfort wife when  . Does not comfort her daughter well.  In general, how good are you at comforting people around you if they are upset?  SKIP IF CLEARLY AVERSE TO SOCIAL INTERACTION  Do you like to hear about other people - How they are spending their lives / about their emotional problems / how they are feeling? -   When other people are having a conversation - Do you want to join in?  How good are you at judging when and how to join in? - Prefer to observe, unless they something that strikes my attention and I know something about it, it's hard not to interrupt. Not very good at doing it. Interrupting. Or I'll just move on.  DIFFICULTIES IN NONVERBAL COMMUNICATION (Ability to integrate their nonverbal expression (gaze, facial expression, body language, and gesture, and the way they speak) and to use it to express emotion and to give life to their speech. They may also be an associated difficulty in describing their feelings (alexithymia). It is also about the ability to understand these nonverbal signals coming from others. OBSERVATION is important in this part of the interview.  Now, I'd like to find out more about how you show how you feel. Are you the sort of person who shows your feelings (Are you an affectionate person?) - Does not show feelings. Not affectionate person. Affectionate with babies and young children.   How do you show your affection? - Doesn't show it to adults. I don't know how.   Do you like hugging or being hugged? - At [employer], I can. But not sappy affectionate hug. Raised in the Oriental orthodox and it's expected.  What makes you feel sad? - Lots of things make me sad  but I don't show it. Feeling sad for someone who had a sad story or experience. I will have no reaction to jump scare. It's all on the inside.  How would you describe the feeling of being sad to someone who has never felt sad? - That's  a tough one. I don't know.  Can you show me a sad face? - Not willing to show it. Can't make myself do it.  What makes you feel anxious? - Pop quiz questions. Stuff I'm not prepared for. Being in the spotlight.  How would you describe the feeling of being anxious to someone who has never felt anxious? - Overwhelmed. Don't want to be there. Things are against you. Pressure, stress.  Can you show me an anxious face? - Probably this - pointing to face.  And now, what makes you feel happy? - Fishing. Nature. Used to hunt. Digging up old bottles in the woods. Medicine bottles from the 1800s and 1900s. Property we grew up on was a doctor. Would hyperfocus on digging bottles.  How would you describe the feeling of being happy to someone who has never felt happy? - Something that excites you. Makes you feel better than you were before.  Can you show me a happy face? -  Smile  It can be difficult to tell what someone really means - They may say one thing but mean something quite different. How good are you at picking up what people are feeling - by their face, gestures, or tone of voice? - I'm really good at telling if people are angry or off. And they don't know that I know. But sometimes hard to determine what they mean tho.  Can you  that they are sad or angry? - I  Can you tell if they are bored by what you're saying? - if talking about something I like, I assume they're not interested but not sure.  Are you able to  when they are being sarcastic or joking? - Sarcasm his entire life. Get in trouble for it.  Do you find that you misunderstand what people are saying? That you take them too literally? Has this been a problem at times? - Rodanthe over the years to not take things  too literally.  DEFICITS IN DEVELOPING, MAINTAINING, AND UNDERSTANDING RELATIONSHIPS (FOR THEIR AGE, GENDER, AND CULTURE) - This is also about their ability to adjust their behavior to the social setting and to share their activities and interests.  I want to talk about how you get along with people. Let's start with how things were at school. How did you get along with people there?  Did you enjoy being with people in school?  Do you have any friends from work?  Do you have many friends now?  What do you look for in a friend (that makes them different from an acquaintance)?  Tell me about your friends. Joaquim  For one or two friends (or intimate partner - explore how they are at developing/maintaining reciprocal relationship)  How old are they?  How did you meet? - Evangel[employer]  How often do you meet now?   Where do you meet? Always come to you or meet elsewhere?  What are they like as a person? - Pretty sure she is ADHD. She's a little bit crazy. She complements me.   What do you like about them? - All those things. And puts up with this.  What are they interested in? - Reading a lot. Fantasy romance. Binge watches tv. Aye, funny shows.  Camping, swimming, beach, food. Lizette.   What sort of things do you do together? - Before kids - camping and hiking. GO to the beach some. [employer] stores  Do you join in their activities even if it's not something you're particularly interested in? - If it's a group thing with people I don't know, then know.  What sort of things do you share with each other?  Do you know when something important happens in their lives? - such as new relationship, having a child, or comeone close to them becomes ill?  Are you interested when something happens to a close relative who lives elsewhere?  B) RESTRICTED, REPETITIVE PATTERNS OF BEHAVIOR, INTERESTS, OR ACTIVITIES  STEREOTYPED/REPETITIVE MOVEMENTS - MOTOR/OBJECTS/SPEECH (Movements, including stereotypy, which are typical of autism (such as  hand-flapping or finger-flicking) / (Objects, arranging or flipping repetitively) / (Speech, repetitive phrases, echo, idiosyncrasies)  Some people have habits - little repeated movements (such as twiddling things, rocking, waving their hand, flicking their fingers). Do you have any habits like this? If none- What about when you are stressed or excited? - Shaking leg. Could not keep legs still in [facility]. Physically hurt. Twitches muscle in right leg or right hip muscle. Rolls eyes until it feels right. Rubs thumb or scratches arm for comfort. Tap fingers. Vocal ones - whistling a lot.   Do you find yourself doing the same thing over and over - such as  Flicking things (like light switches) back and forth  Arranging things in a particular way (perhaps lining them up)  Saying the same phrase or question again and again - Repeat what people say a lot. Repeats what her daughter says sometimes. Probably did it as a child.  Listening to the same song or watching the same video clip again and again - Will watch a short video clip 10 times until I soak up everything.  INSISTENCE ON SAMENESS (The extent to which rigidity is a characteristic. Minor changes are disproportionately upsetting and they may stick to routines or develop ritualized ways of doing things.)  How organized a person are you? - Hard to be organized living with my wife and kids. Organized when living alone.  Are there things you like to do in a particular way; for example,  The way you arrange your things - Displays like pictures. It has to be offset.  Set routines to do things at a certain times or in a certain way - Probably.  Other routines - for example, wearing the same clothes or eating a particular food, day after day - Not a big issue.  What happens if something happens that:  Interrupts your routine so that you can't complete it? - I can be spontaneous if it's my idea and I think it's fun. If it's someone else's idea, I don't enjoy that. Not a  follower.  Or means that you have to change your plans?  How well do you cope with changes in how things are arranged around the house or at work? - Over time, I've learned to let things go. It's all learned.  Do you like doing things spontaneously or do you need time to plan? - See above.  What about doing something new or going to new places? - Stresses me out bad.  RESTRICTED/FIXATED INTERESTS (Interests which are abnormal in their intensity/focus, particularly if they do not appear to serve any useful purpose).  Tell me about your hobbies or special interests. - Mormonism.  Fishing, hunting, being out in the woods. Hiking, digging up treasures. Always loved birds. Metal detecting  Do you have any collections? - Old medicine bottles.  Metal detecting finds.   How much time do you spend on these? - As much as I can. Not much tho because of work and kids. Fish in [place] some.  Do you do this on your own or do you meet up with anyone else? - I like having someone to fish with. More fun and safer. But like fishing alone too.   Are you (or would you like to be) a member of any group/club? - No.  SENSORY HYPER/HYPO REACTIVITY  Either now, or in the past, do you think you've been especially sensitive (or insensitive) to:  The feel of things - such as certain clothing? the touch of others? Being hugged? - If I take a cotton ball and squeeze it, I can't handle it. Paper towel on teeth. Don't like tags in clothing. Cut them all out.   Noise or specific sounds? - Volume of everyday life. At home, I have my headphones on 70% of the time. Not listening to anything 1/2 of that time. People chewing food. Head phones while eating supper. Can't go to a movie theater because of people eating around him.  Very bright or flickering lights, certain colors, patterns, or movement? - Hates the overhead lights. Always turning lights off in the house. I like it dark.  The temperature in a room, compared to other people? - If I get hot, I get  angry. I get so agitated and frustrated.  The sensations from activities, such as swings, roundabouts, or trampolines? - As a kid yes. Enjoy it now, but don't do it because of my body, getting old.  Some people are very unaware of pain. Others have an unusually good sense of smell or strong likes or dislikes with food. Do you notice any of these experiences? - germaphobe - has to wash his hands all the time.   Least picky food person I know.  Do any of these affect what you can do?  OBSERVATION  Social interaction (Extent to which the subject comfortably mixes with, and relates to, other people)   - shook hand and diverted eye contact.  Communication - Speech - Extent to which:  Speech sounds normal - Unusual tone, stress, pitch, rate, rhythm, volume? - monotone mostly  Tone of voice reflects the underlying emotion - No, some changes in tone when using emphasis   They are able to engage in conversation, taking turns at the appropriate point. - Yes. Sometimes interrupts inappropriately and circumstantial at times.  They appreciate how much/little information the hearer requires to make sense of what is being said. - Yes, sometimes too much detail provided.  Speech is unusually forma/pedantic. - No  Communication - Nonverbal - Extent to which:  Facial expression is varied, communicative, and vivacious. - no  Eye contact is natural and expressive and is used to reinforce what is being said. - Poor eye contact, limited at times and forced at times  Gesture is uses and whether it is:  Emphatic (beats hand) - No  Conventional (clapping, hands over mouth) - No  Informational (nods, shakes head, shrugs, pointing) - Yes  Descriptive (showing something's shape or size) - no  Appearance   Any unusual stereotypes (hand flapping, finger twiddling, rocking) - shaking foot with leg crossed over knee  Anything else that might appear unusual/eccentric - no        Mental Status Exam      Appearance: normal weight; casually  dressed  Grooming: casual, appropriate to context  Arousal: alert, awake  Behavior/Cooperation: cooperative; no eye contact with handshake, looked away (turned head)  Speech: normal tone, normal rate, normal pitch, normal volume  Language: appropriate english vocabulary  Mood: fine  Affect: restricted  Thought Process: normal and logical  Thought Content: normal, no suicidality, no homicidality, delusions, or paranoia  Associations: no loose associations noted  Orientation: grossly intact  Memory: Grossly intact  Fund of Knowledge: appropriate for education level  Attention Span/Concentration: Normal  Cognition: grossly intact  Insight: fair  Judgment: fair          DIAGNOSTIC IMPRESSION  Based on the diagnostic evaluation and background information provided, the current diagnostic impression is: autism spectrum disorder    PLAN  Parent to send previous evaluations.  Clinician to send questionnaires.  Clinician to meet with child for in person testing to complete diagnostic evaluation.    The following information related to confidentiality and limits of confidentiality was reviewed with the patient and/or their caregiver at the start of the session. All interactions which take place during our assessment and/or therapy sessions are considered confidential. This includes requests by telephone, all interactions with this and other providers involved, any scheduling or appointment notes, all session content records, and any progress notes that I take during your sessions. I will not even verify that you or your child are a client/patient. You may choose to give me permission in writing to release information about you/your child to any person or agency that you designate. A specific consent form will be reviewed for you to sign in these instances and consent is voluntary. There are situations where I am required to break confidentiality without consent:     I must break confidentiality if I am compelled to release  information in a legal proceeding or am subpoenaed to do so.   I must break confidentiality in situations when there is identified or suspected physical or sexual abuse or neglect of anyone under 18 years of age, an elderly person, or disabled person. In these instances, I am legally required to report this information to the appropriate state agency that handles these cases of abuse or neglect (e.g., Department of Child and Family Services, Adult Protective Services, local law enforcement).   I must break confidentiality to uphold my duty to protect and warn others in situations with identifiable threats of harm made by you or the patient against others. This can be in the form of telling the person who is threatened, contacting the police, or placing you or the patient into hospital confinement.   I must break confidentiality if there is evidence that you or the patient are a danger to self and at risk of attempted/successful suicide if protective measures are not taken. This may include hospital confinement, or disclosure to family members or others who can help provide protection.   There may be times when consultation services are sought related to care for you or child with other providers within the [facility]. In these instances, specific consent is not needed to share information. There may be times when consultation is sought from other professionals outside of the [facility] system. In these cases, no personally identifiable information will be used to discuss this case. There will be no exchange of printed or verbal information outside the [facility] without an appropriate release of information that you review and sign.    The patient and/or caregiver verbally acknowledged understanding of confidentiality and the limits of confidentiality.    ______________________________________________________________  Nathaniel Garces, PhD.  Licensed Psychologist - #[id number]  [facility] -  [place]  [address]  [place] [address]  Phone: [phone], opt. 1

## 2025-05-02 ENCOUNTER — PATIENT MESSAGE (OUTPATIENT)
Dept: PSYCHIATRY | Facility: CLINIC | Age: 40
End: 2025-05-02
Payer: COMMERCIAL

## 2025-05-07 ENCOUNTER — TELEPHONE (OUTPATIENT)
Dept: PSYCHIATRY | Facility: CLINIC | Age: 40
End: 2025-05-07
Payer: COMMERCIAL

## 2025-05-07 NOTE — TELEPHONE ENCOUNTER
"Pt called in stating he was supposed to be contacting to set up an eval for autism.     I reached out to NICKI Lassiter for assistance.  Her response. - "we are still waiting for insurance to approve the evaluation. once insurance approves cristi will reach out and will email him rating scales to complete once those are completed then i will reach out to schedule his evaluation"      Pt stated his understanding.  "

## 2025-05-14 ENCOUNTER — OFFICE VISIT (OUTPATIENT)
Dept: PSYCHIATRY | Facility: CLINIC | Age: 40
End: 2025-05-14
Payer: COMMERCIAL

## 2025-05-14 VITALS
SYSTOLIC BLOOD PRESSURE: 128 MMHG | HEIGHT: 68 IN | HEART RATE: 69 BPM | DIASTOLIC BLOOD PRESSURE: 88 MMHG | WEIGHT: 163.56 LBS | BODY MASS INDEX: 24.79 KG/M2

## 2025-05-14 DIAGNOSIS — F41.1 GENERALIZED ANXIETY DISORDER WITH PANIC ATTACKS: ICD-10-CM

## 2025-05-14 DIAGNOSIS — F32.5 MAJOR DEPRESSIVE DISORDER WITH SINGLE EPISODE, IN FULL REMISSION: ICD-10-CM

## 2025-05-14 DIAGNOSIS — F40.10 SOCIAL ANXIETY DISORDER: ICD-10-CM

## 2025-05-14 DIAGNOSIS — F41.0 GENERALIZED ANXIETY DISORDER WITH PANIC ATTACKS: ICD-10-CM

## 2025-05-14 DIAGNOSIS — F95.1 CHRONIC MOTOR TIC DISORDER: ICD-10-CM

## 2025-05-14 DIAGNOSIS — F90.0 ADHD (ATTENTION DEFICIT HYPERACTIVITY DISORDER), INATTENTIVE TYPE: Primary | ICD-10-CM

## 2025-05-14 PROCEDURE — G2211 COMPLEX E/M VISIT ADD ON: HCPCS | Mod: S$GLB,,, | Performed by: STUDENT IN AN ORGANIZED HEALTH CARE EDUCATION/TRAINING PROGRAM

## 2025-05-14 PROCEDURE — 1159F MED LIST DOCD IN RCRD: CPT | Mod: CPTII,S$GLB,, | Performed by: STUDENT IN AN ORGANIZED HEALTH CARE EDUCATION/TRAINING PROGRAM

## 2025-05-14 PROCEDURE — 96136 PSYCL/NRPSYC TST PHY/QHP 1ST: CPT | Mod: 59,S$GLB,, | Performed by: STUDENT IN AN ORGANIZED HEALTH CARE EDUCATION/TRAINING PROGRAM

## 2025-05-14 PROCEDURE — 3008F BODY MASS INDEX DOCD: CPT | Mod: CPTII,S$GLB,, | Performed by: STUDENT IN AN ORGANIZED HEALTH CARE EDUCATION/TRAINING PROGRAM

## 2025-05-14 PROCEDURE — 3074F SYST BP LT 130 MM HG: CPT | Mod: CPTII,S$GLB,, | Performed by: STUDENT IN AN ORGANIZED HEALTH CARE EDUCATION/TRAINING PROGRAM

## 2025-05-14 PROCEDURE — 99214 OFFICE O/P EST MOD 30 MIN: CPT | Mod: S$GLB,,, | Performed by: STUDENT IN AN ORGANIZED HEALTH CARE EDUCATION/TRAINING PROGRAM

## 2025-05-14 PROCEDURE — 3079F DIAST BP 80-89 MM HG: CPT | Mod: CPTII,S$GLB,, | Performed by: STUDENT IN AN ORGANIZED HEALTH CARE EDUCATION/TRAINING PROGRAM

## 2025-05-14 PROCEDURE — 99999 PR PBB SHADOW E&M-EST. PATIENT-LVL III: CPT | Mod: PBBFAC,,, | Performed by: STUDENT IN AN ORGANIZED HEALTH CARE EDUCATION/TRAINING PROGRAM

## 2025-05-14 PROCEDURE — 1160F RVW MEDS BY RX/DR IN RCRD: CPT | Mod: CPTII,S$GLB,, | Performed by: STUDENT IN AN ORGANIZED HEALTH CARE EDUCATION/TRAINING PROGRAM

## 2025-05-14 RX ORDER — LISDEXAMFETAMINE DIMESYLATE 40 MG/1
40 CAPSULE ORAL EVERY MORNING
Qty: 30 CAPSULE | Refills: 0 | Status: SHIPPED | OUTPATIENT
Start: 2025-05-14

## 2025-05-14 RX ORDER — PROPRANOLOL HYDROCHLORIDE 10 MG/1
10 TABLET ORAL 2 TIMES DAILY
Qty: 60 TABLET | Refills: 1 | Status: SHIPPED | OUTPATIENT
Start: 2025-05-14 | End: 2025-07-13

## 2025-05-14 NOTE — PROGRESS NOTES
Outpatient Psychiatry Followup Visit (with Ambient Listening)  5/14/2025  Assessment & Plan    Impression     ICD-10-CM ICD-9-CM   1. ADHD (attention deficit hyperactivity disorder), inattentive type  F90.0 314.00   2. Major depressive disorder with single episode, in full remission  F32.5 296.26   3. Social anxiety disorder  F40.10 300.23   4. Generalized anxiety disorder with panic attacks  F41.1 300.02    F41.0 300.01   5. Chronic motor tic disorder  F95.1 307.22   6. BMI 24.0-24.9, adult  Z68.24 V85.1      Plan of Care & Medication Management    Chart was reviewed. The risks and benefits of medication were discussed with pt. The treatment plan and followup plan were reviewed with pt. Pt understands to contact clinic if symptoms worsen. Pt understands to call 911 or go to nearest ER for suicidal ideation, intent or plan. Unless otherwise specified, pt did NOT display signs of nor endorse symptoms of overt psychosis or acute mood disorder requiring hospitalization during the encounter; pt denied violent thoughts or suicidal or homicidal ideation, intent, or plan. Portions of this note was generated with the assistance of ambient listening technology. Verbal consent was obtained by the patient and accompanying visitor(s) for the recording of patient appointment to facilitate this note. I attest to having reviewed and edited the generated note for accuracy, though some syntax or spelling errors may persist. Please contact the author of this note for any clarification. Unless otherwise specified, pt did NOT display signs of nor endorse symptoms of overt psychosis or acute mood disorder requiring hospitalization during the encounter; pt denied violent thoughts or suicidal or homicidal ideation, intent, or plan.   Ambient Data Assessment & Plan    IMPRESSION:  Discontinued guanfacine due to side effects of excessive sleepiness and orthostatic hypotension.  Considered clonidine as alternative for tic management,  but decided against it due to potential similar side effects.  Increased Vyvanse from 30 mg to 40 mg daily to improve ADHD symptom management.  Noted elevated cholesterol levels from March bloodwork, but no immediate intervention planned beyond lifestyle modifications.  Continued propranolol for management of panic attacks and social anxiety, noting improvement in symptoms.  Discussed potential connection between chocolate consumption and elevated heart rate, possibly exacerbated by Vyvanse.  Continued Zoloft.    EDUCATION:  Explained importance of managing cholesterol levels, noting current total cholesterol of 208 (target <200) and low HDL of 37 (target =40).    MEDICATIONS - SEE BOX BELOW:  Discontinued guanfacine due to side effects of excessive sleepiness and orthostatic hypotension.  Increased Vyvanse from 30 mg to 40 mg daily to improve ADHD symptom management.  Continued propranolol for management of panic attacks and social anxiety, noting improvement in symptoms.  Continued Zoloft.    FOLLOW UP:  Follow up on Wednesday, June 11th at 4:30 PM.  Contact the office if any issues arise before the next visit.        RX History ATARAX/VISTARIL (tachycardia), BUSPAR (panic attacks while taking), INTUNIV (fatigue, faint), PROPRANOLOL, VYVANSE, ZOLOFT   Current RX Discontinue INTUNIV  Pt was provided [employer] educational material 4/16/2025  Adjustments:  5/14/2025: Discontinued in interim  (could NOT tolerate)  4/16/2025: Start 1mg HS  Continue PROPRANOLOL  Adjustments:  12/11/2024: Start 10mg BID  Increase VYVANSE  Monitor BP  Considering adding CLONIDINE  Adjustments:  5/14/2025: Increase to 40mg qam  2/12/2025: Increase to 30mg qam  3/12/2025 ASRS 2/2 (11-24)  1/29/2025: Start 20mg qam  2/12/2025 ASRS 2/2 (10-23)  Prior to 1/29/2025 pt was taking ZOLOFT 75mg daily  1/29/2025 ASRS 4/3 (18-29)  Continue ZOLOFT  Adjustments:  1/29/2025: Reduce to 75mg daily  1/29/2025: Reports taking 75mg daily (100mg worsened anxiety  "and early AM awakenings)  12/11/2024: Increase to 100mg daily  10/30/2024: Increase to 75mg daily  9/24/2024: Continue ZOLOFT 50mg daily  Prior to 9/24/2024, pt had been taking ZOLOFT 50mg for 1 week      Other []SLEEP HYGIENE  []THERAPY  []CONTRACT 5/14/2025?  [] REVIEWED 5/14/2025?  []NRT  []     RETURN K: STANDARD PROTOCOL: RETURN IN 4 WEEKS (ONE MONTH)       Subjective    Available documentation has been reviewed, and pertinent elements of the chart have been incorporated into this note where appropriate. Last Epic encounter with writer was on 4/16/2025   Yovani Melvin, a 40 y.o. male, presenting for followup visit. This visit was done in person, IN CLINIC.    Ambient Data     History of Present Illness    HPI:  Patient presents for medication management of ADHD and to discuss discontinuation of guanfacine due to side effects. Patient reports discontinuing guanfacine due to significant side effects, including excessive sleepiness and increased dizziness upon standing quickly (orthostatic hypotension). While the medication helped reduce tic frequency, the patient felt the side effects outweighed the benefits. He expresses interest in increasing the Vyvanse dosage, stating it's "really on the right track" and believes "a little more would be better."    Patient discovered a correlation between chocolate consumption and elevated heart rate, noticing this pattern after eating Oreos and chocolate brownies with ice cream.    Recent labs from March showed elevated cholesterol levels (208) and low HDL (37). Patient acknowledges the need for dietary improvements but finds it challenging due to the cost of healthier food options. There is a family history of high cholesterol.    Patient reports no recent panic attacks and describes his social anxiety as "pretty good" and "way better than it used to be." He is not feeling depressed and reports sleeping "pretty good." Patient describes work as "pretty good" and home life " "as "all right" but busy.            Objective    Vitals:    05/14/25 0927   BP: 128/88   Pulse: 69   Weight: 74.2 kg (163 lb 9.3 oz)   Height: 5' 8" (1.727 m)     (Current body mass index is 24.87 kg/m².)    MSE/PE  1. Appearance: Dress is informal but appropriate. Motor activity normal.  2. Discourse: Clear speech with normal rate and volume. Associations intact. Orderly.  3. Emotional Expression: Somewhat anxious.  4. Perception and Thinking: No hallucinations. No suicidality, no homicidality, delusions, or paranoia.  5. Sensorium: Grossly intact. Able to focus for interview.  6. Memory and Fund of Knowledge: Intact for content of interview.  7. Insight and Judgment: Intact.       Measurement-Based Care (MBC):     Routine Instruments   PROMIS-ANXIETY Interpretation: 4 (4a raw score): T-SCORE 40.3; NONE TO SLIGHT using 55-60-70 cutoffs.   PROMIS-DEPRESSION Interpretation: 4 (4a raw score): T-SCORE 41.0; NONE TO SLIGHT using 55-60-70 cutoffs.   PSS4 Interpretation: 300: Low stress appraisal. 0 PH, 0 LSE.   Additional Instruments   MBC ANCHOR : about the same         Auto-populated Chart Data:     The chart was reviewed for recent diagnostic procedures and investigations, and pertinent results are noted below.   Encounter Medications  Encounter Medications[1]   Cardiometabolic  EKG Results  Results for orders placed or performed during the hospital encounter of 07/22/24   EKG 12-lead    Collection Time: 07/22/24  7:25 PM   Result Value Ref Range    QRS Duration 86 ms    [facility] QTC Calculation 414 ms    Narrative    Test Reason : R07.9,    Vent. Rate : 085 BPM     Atrial Rate : 085 BPM     P-R Int : 136 ms          QRS Dur : 086 ms      QT Int : 348 ms       P-R-T Axes : 027 050 019 degrees     QTc Int : 414 ms    Normal sinus rhythm  Normal ECG  When compared with ECG of 21-JUL-2024 03:41,  No significant change was found  Confirmed by Carmelina Ceron MD ([id number]) on 7/23/2024 4:51:45 PM    Referred By: [email or web address]   " "SELF           Confirmed By:Carmelina Ceron MD        Labs  Lab Results   Component Value Date    TSH 2.291 03/19/2025    GLU 99 03/19/2025    HGBA1C 5.6 05/16/2024    CHOL 208 (H) 03/19/2025    TRIG 108 03/19/2025    HDL 37 (L) 03/19/2025    LDLCALC 149.4 03/19/2025       BMI Trend - (Current body mass index is 24.87 kg/m².)  Wt Readings from Last 7 Encounters:   05/14/25 74.2 kg (163 lb 9.3 oz)   04/16/25 72.8 kg (160 lb 7.9 oz)   03/24/25 74.7 kg (164 lb 10.9 oz)   03/13/25 74.4 kg (164 lb)   03/12/25 73 kg (160 lb 15 oz)   02/12/25 74 kg (163 lb 2.3 oz)   01/29/25 74.7 kg (164 lb 10.9 oz)       BP/HR Trend   BP Readings from Last 3 Encounters:   05/14/25 128/88   04/16/25 120/84   03/24/25 116/84      Pulse Readings from Last 3 Encounters:   05/14/25 69   04/16/25 80   03/24/25 98       Endocrine Lab Results   Component Value Date    TSH 2.291 03/19/2025      Hematologic   Lab Results   Component Value Date    WBC 4.99 03/19/2025    RBC 5.03 03/19/2025    HGB 14.9 03/19/2025    HCT 44.3 03/19/2025    MCV 88 03/19/2025    MCH 29.6 03/19/2025    RDW 12.1 03/19/2025     03/19/2025    MPV 9.4 03/19/2025    GRAN 3.3 03/19/2025    GRAN 67.0 03/19/2025      Hepatorenal Lab Results   Component Value Date     03/19/2025    K 4.6 03/19/2025    CALCIUM 9.4 03/19/2025    PHOS 3.7 07/22/2024    MG 2.3 07/22/2024    CO2 28 03/19/2025    ANIONGAP 6 (L) 03/19/2025    CREATININE 1.13 03/19/2025    BUN 12 03/19/2025    EGFRNORACEVR >60 03/19/2025    SPECGRAV 1.020 07/21/2024    PROTEINUA 2+ (A) 07/21/2024    AST 23 03/19/2025    ALT 20 03/19/2025    BILITOT 0.5 03/19/2025    LABPROT 11.1 07/22/2024    ALBUMIN 4.5 03/19/2025    INR 1.0 07/22/2024      Medication Level No results found for: "LITHIUM", "VALPROATE", "CBMZ", "CARBAMAZ", "LAMOTRIGINE", "PHENYTOIN", "PHENOBARB", "CLOZAPINE", "NORCLOZAP", "CLOZNORCLOZ", "CLONAZEPAM", "MERCY", "VENLAFAXINE", "NORTRIP", "OXCARBAZ"   Other Labs Lab Results   Component Value " "Date    HEPCAB NON-REACTIVE 05/16/2024      Drug Screening   AMP * (*) METHAMP * (*)   CHANCE * (*) MORPH * (*)   BUP * (*) OXY * (*)   BENZO * (*) METHADONE * (*)   BULMARO * (*) THC * (*)   HX No results found for: "AMPHETAMINES", "PCDSOPHENCYN", "COCAINEMETAB", "POCCOC", "COCAINE", "BULMARO", "COCAINEURINE", "POCCOCDRUG", "PCDSUTRAMA", "PCDSOBENZOD", "BARBITURATES", "PCDSCOMETHA", "OPIATESCREEN", "PCDSUBUPRE", "PCDSUFENTANY", "PCDSUOXYCOD", "BUPRENORPH", "NORBUPRENOR", "MARIJUANATHC"  No results found for: "PCDSOALCOHOL", "ALCOHOLMEDIC", "THEYLGLUCU", "ETHYLSULF", "[id number]", "PETH"         Billing Documentation:     Method IN PERSON visit at the clinic, established   E/M 869[address]: FOLLOW UP VISIT, Rx mgmt, "Multiple STABLE chronic illnesses"   Additional 29260, with modifer 59: administered and scored more than one psychological or neuropsychological tests (see MBC above) (16+ mins)  , without modifiers -24,-25,-53: COMPLEXITY: Visit today included increased complexity associated with the care of the episodic problem(s) (multiple psychiatric disorders - see above) addressed and managing the longitudinal care of the patient due to the serious and/or complex managed problem(s) (multiple psychiatric disorders - see above).                Mumtaz Stein DO  Department of Psychiatry, [facility]             [1]   Outpatient Encounter Medications as of 5/14/2025   Medication Sig Dispense Refill    ketoconazole (NIZORAL) 2 % cream Apply topically once daily. 60 g 1    ketoconazole (NIZORAL) 2 % shampoo Apply topically twice a week. 120 mL 0    lisdexamfetamine (VYVANSE) 40 MG Cap Take 1 capsule (40 mg total) by mouth every morning. 30 capsule 0    propranoloL (INDERAL) 10 MG tablet Take 1 tablet (10 mg total) by mouth 2 (two) times daily. 60 tablet 1    sertraline (ZOLOFT) 25 MG tablet Take 3 tablets (75 mg total) by mouth once daily. 90 tablet 2    [DISCONTINUED] guanFACINE 1 mg Tb24 Take 1 tablet (1 mg total) by " mouth every evening. 30 tablet 1    [DISCONTINUED] lisdexamfetamine (VYVANSE) 30 MG capsule Take 1 capsule (30 mg total) by mouth once daily. 30 capsule 0    [DISCONTINUED] propranoloL (INDERAL) 10 MG tablet Take 1 tablet (10 mg total) by mouth 2 (two) times daily. 60 tablet 1     No facility-administered encounter medications on file as of 5/14/2025.

## 2025-05-14 NOTE — PATIENT INSTRUCTIONS
Discontinue GUANFACINE  Increase VYVANSE from 30mg daily to 40mg daily  Continue other medicine as prescribed

## 2025-05-21 ENCOUNTER — PATIENT MESSAGE (OUTPATIENT)
Dept: PSYCHIATRY | Facility: CLINIC | Age: 40
End: 2025-05-21
Payer: COMMERCIAL

## 2025-05-22 DIAGNOSIS — F32.4 MAJOR DEPRESSIVE DISORDER WITH SINGLE EPISODE, IN PARTIAL REMISSION: ICD-10-CM

## 2025-05-22 DIAGNOSIS — F41.1 GENERALIZED ANXIETY DISORDER WITH PANIC ATTACKS: ICD-10-CM

## 2025-05-22 DIAGNOSIS — F41.0 GENERALIZED ANXIETY DISORDER WITH PANIC ATTACKS: ICD-10-CM

## 2025-05-22 DIAGNOSIS — F40.10 SOCIAL ANXIETY DISORDER: ICD-10-CM

## 2025-05-22 RX ORDER — SERTRALINE HYDROCHLORIDE 25 MG/1
75 TABLET, FILM COATED ORAL
Qty: 90 TABLET | Refills: 0 | Status: SHIPPED | OUTPATIENT
Start: 2025-05-22

## 2025-06-11 ENCOUNTER — OFFICE VISIT (OUTPATIENT)
Dept: PSYCHIATRY | Facility: CLINIC | Age: 40
End: 2025-06-11
Payer: COMMERCIAL

## 2025-06-11 VITALS
BODY MASS INDEX: 24.46 KG/M2 | HEART RATE: 94 BPM | WEIGHT: 161.38 LBS | SYSTOLIC BLOOD PRESSURE: 131 MMHG | DIASTOLIC BLOOD PRESSURE: 83 MMHG | HEIGHT: 68 IN

## 2025-06-11 DIAGNOSIS — F40.10 SOCIAL ANXIETY DISORDER: ICD-10-CM

## 2025-06-11 DIAGNOSIS — F32.5 MAJOR DEPRESSIVE DISORDER WITH SINGLE EPISODE, IN FULL REMISSION: ICD-10-CM

## 2025-06-11 DIAGNOSIS — F41.1 GENERALIZED ANXIETY DISORDER WITH PANIC ATTACKS: ICD-10-CM

## 2025-06-11 DIAGNOSIS — F90.0 ADHD (ATTENTION DEFICIT HYPERACTIVITY DISORDER), INATTENTIVE TYPE: Primary | ICD-10-CM

## 2025-06-11 DIAGNOSIS — F41.0 GENERALIZED ANXIETY DISORDER WITH PANIC ATTACKS: ICD-10-CM

## 2025-06-11 DIAGNOSIS — F95.1 CHRONIC MOTOR TIC DISORDER: ICD-10-CM

## 2025-06-11 PROBLEM — F32.4 MAJOR DEPRESSIVE DISORDER WITH SINGLE EPISODE, IN PARTIAL REMISSION: Status: RESOLVED | Noted: 2024-09-24 | Resolved: 2025-06-11

## 2025-06-11 PROCEDURE — 1159F MED LIST DOCD IN RCRD: CPT | Mod: CPTII,S$GLB,, | Performed by: STUDENT IN AN ORGANIZED HEALTH CARE EDUCATION/TRAINING PROGRAM

## 2025-06-11 PROCEDURE — 3008F BODY MASS INDEX DOCD: CPT | Mod: CPTII,S$GLB,, | Performed by: STUDENT IN AN ORGANIZED HEALTH CARE EDUCATION/TRAINING PROGRAM

## 2025-06-11 PROCEDURE — 1160F RVW MEDS BY RX/DR IN RCRD: CPT | Mod: CPTII,S$GLB,, | Performed by: STUDENT IN AN ORGANIZED HEALTH CARE EDUCATION/TRAINING PROGRAM

## 2025-06-11 PROCEDURE — 3079F DIAST BP 80-89 MM HG: CPT | Mod: CPTII,S$GLB,, | Performed by: STUDENT IN AN ORGANIZED HEALTH CARE EDUCATION/TRAINING PROGRAM

## 2025-06-11 PROCEDURE — 96136 PSYCL/NRPSYC TST PHY/QHP 1ST: CPT | Mod: 59,S$GLB,, | Performed by: STUDENT IN AN ORGANIZED HEALTH CARE EDUCATION/TRAINING PROGRAM

## 2025-06-11 PROCEDURE — 99214 OFFICE O/P EST MOD 30 MIN: CPT | Mod: S$GLB,,, | Performed by: STUDENT IN AN ORGANIZED HEALTH CARE EDUCATION/TRAINING PROGRAM

## 2025-06-11 PROCEDURE — 99999 PR PBB SHADOW E&M-EST. PATIENT-LVL III: CPT | Mod: PBBFAC,,, | Performed by: STUDENT IN AN ORGANIZED HEALTH CARE EDUCATION/TRAINING PROGRAM

## 2025-06-11 PROCEDURE — 3075F SYST BP GE 130 - 139MM HG: CPT | Mod: CPTII,S$GLB,, | Performed by: STUDENT IN AN ORGANIZED HEALTH CARE EDUCATION/TRAINING PROGRAM

## 2025-06-11 PROCEDURE — G2211 COMPLEX E/M VISIT ADD ON: HCPCS | Mod: S$GLB,,, | Performed by: STUDENT IN AN ORGANIZED HEALTH CARE EDUCATION/TRAINING PROGRAM

## 2025-06-11 RX ORDER — PROPRANOLOL HYDROCHLORIDE 10 MG/1
10 TABLET ORAL 2 TIMES DAILY
Qty: 60 TABLET | Refills: 1 | Status: SHIPPED | OUTPATIENT
Start: 2025-06-11 | End: 2025-08-10

## 2025-06-11 RX ORDER — LISDEXAMFETAMINE DIMESYLATE 40 MG/1
40 CAPSULE ORAL EVERY MORNING
Qty: 30 CAPSULE | Refills: 0 | Status: SHIPPED | OUTPATIENT
Start: 2025-06-11

## 2025-06-11 RX ORDER — LISDEXAMFETAMINE DIMESYLATE 40 MG/1
40 CAPSULE ORAL EVERY MORNING
Qty: 30 CAPSULE | Refills: 0 | Status: SHIPPED | OUTPATIENT
Start: 2025-07-11

## 2025-06-11 RX ORDER — SERTRALINE HYDROCHLORIDE 25 MG/1
75 TABLET, FILM COATED ORAL DAILY
Qty: 90 TABLET | Refills: 2 | Status: SHIPPED | OUTPATIENT
Start: 2025-06-11

## 2025-06-11 NOTE — PROGRESS NOTES
Outpatient Psychiatry Followup Visit - IN PERSON  6/11/2025  Assessment & Plan    Impression     ICD-10-CM ICD-9-CM   1. ADHD (attention deficit hyperactivity disorder), inattentive type  F90.0 314.00   2. Major depressive disorder with single episode, in full remission  F32.5 296.26   3. Social anxiety disorder  F40.10 300.23   4. Generalized anxiety disorder with panic attacks  F41.1 300.02    F41.0 300.01   5. Chronic motor tic disorder  F95.1 307.22   6. BMI 24.0-24.9, adult  Z68.24 V85.1      Plan of Care & Medication Management    Chart was reviewed. The risks and benefits of medication were discussed with pt. The treatment plan and followup plan were reviewed with pt. Pt understands to contact clinic if symptoms worsen. Pt understands to call 911 or go to nearest ER for suicidal ideation, intent or plan. Unless otherwise specified, pt did NOT display signs of nor endorse symptoms of overt psychosis or acute mood disorder requiring hospitalization during the encounter; pt denied violent thoughts or suicidal or homicidal ideation, intent, or plan.   RX History ATARAX/VISTARIL (tachycardia), BUSPAR (panic attacks while taking), INTUNIV (fatigue, faint), PROPRANOLOL, VYVANSE, ZOLOFT   Current RX Continue PROPRANOLOL  Adjustments:  12/11/2024: Start 10mg BID  Continue VYVANSE  Monitor BP  Considering adding CLONIDINE  Adjustments:  5/14/2025: Increase to 40mg qam  6/11/2025 ASRS 2/2 (13-24)  2/12/2025: Increase to 30mg qam  3/12/2025 ASRS 2/2 (11-24)  1/29/2025: Start 20mg qam  2/12/2025 ASRS 2/2 (10-23)  Prior to 1/29/2025 pt was taking ZOLOFT 75mg daily  1/29/2025 ASRS 4/3 (18-29)  Continue ZOLOFT  Adjustments:  1/29/2025: Reduce to 75mg daily  1/29/2025: Reports taking 75mg daily (100mg worsened anxiety and early AM awakenings)  12/11/2024: Increase to 100mg daily  10/30/2024: Increase to 75mg daily  9/24/2024: Continue ZOLOFT 50mg daily  Prior to 9/24/2024, pt had been taking ZOLOFT 50mg for 1 week     "  Other []CONTRACT 6/11/2025?  [x] REVIEWED 6/11/2025?  []   RETURN R: STANDARD PROTOCOL: RETURN IN 8 WEEKS (TWO MONTHS)       Subjective    Available documentation has been reviewed, and pertinent elements of the chart have been incorporated into this note where appropriate. Last [facility] encounter with writer was on 5/14/2025   Yovani Melvin, a 40 y.o. male, presenting for followup visit. This visit was done in person, IN CLINIC.      Work is good  Home life is good    Tolerated VYVANSE increase  NO adverse effects  NO palpitations  Today's pulse 94, will continue to monitor    Mood is stable  Denies depressed mood  Anxiety is controlled  NO panic attacks in interim   Sleeping well    Fidgeting persists  Pt suspects plateau    Autism testing pending    Considering pulse, pending autism testing, etc., pt would like to continue treatment as planned for now  Reduce visit frequency to q2m        Objective    Vitals:    06/11/25 1612   BP: 131/83   Pulse: 94   Weight: 73.2 kg (161 lb 6 oz)   Height: 5' 8" (1.727 m)     (Current body mass index is 24.54 kg/m².)    MSE/PE  1. Appearance: Dress is informal but appropriate. Motor activity normal.  2. Discourse: Clear speech with normal rate and volume. Associations intact. Orderly.  3. Emotional Expression: Euthymic mood.  4. Perception and Thinking: No hallucinations. No suicidality, no homicidality, delusions, or paranoia.  5. Sensorium: Grossly intact. Able to focus for interview.  6. Memory and Fund of Knowledge: Intact for content of interview.  7. Insight and Judgment: Intact.       Measurement-Based Care (MBC):     Routine Instruments   PROMIS-ANXIETY Interpretation: 4 (4a raw score): T-SCORE 40.3; NONE TO SLIGHT using 55-60-70 cutoffs.   PROMIS-DEPRESSION Interpretation: 4 (4a raw score): T-SCORE 41.0; NONE TO SLIGHT using 55-60-70 cutoffs.   WHO-5: 18 raw: 72%   Additional Instruments   MBC ANCHOR : a little better   ASRS Interpretation: ASRS A: 13/24 (STRATA 2); " "ASRS B: 24/48 (STRATA 2). Compared to last time of 2/2, severity shows NO significant change.       Auto-populated chart data omitted from this note for brevity.      Billing Documentation:     Method IN PERSON visit at the clinic, established   E/M 97796: FOLLOW UP VISIT, Rx mgmt, "Multiple STABLE chronic illnesses"   Additional 95269, with modifer 59: administered and scored more than one psychological or neuropsychological tests (see MBC above) (16+ mins)  , without modifiers -24,-25,-53: COMPLEXITY: Visit today included increased complexity associated with the care of the episodic problem(s) (multiple psychiatric disorders - see above) addressed and managing the longitudinal care of the patient due to the serious and/or complex managed problem(s) (multiple psychiatric disorders - see above).                Mumtaz Stein,   Department of Psychiatry, [facility]      "

## 2025-06-25 ENCOUNTER — OFFICE VISIT (OUTPATIENT)
Dept: PSYCHIATRY | Facility: CLINIC | Age: 40
End: 2025-06-25
Payer: COMMERCIAL

## 2025-06-25 DIAGNOSIS — Z13.41 ENCOUNTER FOR AUTISM SCREENING: Primary | ICD-10-CM

## 2025-06-25 PROCEDURE — 99499 UNLISTED E&M SERVICE: CPT | Mod: S$GLB,,, | Performed by: PSYCHOLOGIST

## 2025-07-16 ENCOUNTER — OFFICE VISIT (OUTPATIENT)
Dept: PSYCHIATRY | Facility: CLINIC | Age: 40
End: 2025-07-16
Payer: COMMERCIAL

## 2025-07-16 DIAGNOSIS — Z13.41 ENCOUNTER FOR AUTISM SCREENING: ICD-10-CM

## 2025-07-16 DIAGNOSIS — F84.0 AUTISM SPECTRUM DISORDER REQUIRING SUPPORT (LEVEL 1): Primary | ICD-10-CM

## 2025-07-16 PROCEDURE — 96137 PSYCL/NRPSYC TST PHY/QHP EA: CPT | Mod: S$GLB,,, | Performed by: PSYCHOLOGIST

## 2025-07-16 PROCEDURE — 96136 PSYCL/NRPSYC TST PHY/QHP 1ST: CPT | Mod: S$GLB,,, | Performed by: PSYCHOLOGIST

## 2025-07-16 PROCEDURE — 90832 PSYTX W PT 30 MINUTES: CPT | Mod: S$GLB,,, | Performed by: PSYCHOLOGIST

## 2025-07-16 PROCEDURE — 96131 PSYCL TST EVAL PHYS/QHP EA: CPT | Mod: S$GLB,,, | Performed by: PSYCHOLOGIST

## 2025-07-16 PROCEDURE — 96130 PSYCL TST EVAL PHYS/QHP 1ST: CPT | Mod: S$GLB,,, | Performed by: PSYCHOLOGIST

## 2025-07-24 NOTE — PROGRESS NOTES
"Name: Yovani Melvin YOB: 1985     Age: 40   Dates of Assessment: 4/23/25; 6/25/25 Gender: Male   Date of Feedback: 07/16/2025         Examiner: Nathaniel Garces, PhD              Length of Session: 60 minutes     Individuals Present During Appointment:  Patient   CPT Code: CPT Requested and units:     68928  = 60 minutes , 51313 (3) = 180 minutes, 24935 = 30 minutes, 19091 (4)  = 120 minutes, 49010, 65129     Total Time: 360 minutes     Referral Reason: Mr. Melvin was evaluated due to concerns regarding Autism Spectrum Disorder.     Session Summary: An interactive feedback session was completed with Mr. Melvin. Diagnostic information based on assessment results was provided, along with discussion of recommendations for intervention and treatment planning. He was given the opportunity to ask questions and express concerns. He was in agreement with the assessment results and indicated that he plans to pursue the recommendations provided. The psychologist will remain available for further consultation as needed. This patient is discharged from testing. The full report is uploaded in pt's into pt's "Media" folder in his chart.     Evaluation Results:   - Assessment of Mr. Melvin' cognitive functioning on the Aranda Brief Intelligence Test, Second Edition, Revised (KBIT-2-R). His performance resulted in the following standard scores: Verbal SS=91, Nonverbal SS=92, and IQ Composite=90.     - His performance on the Autism Diagnostic Observation Schedule, 2nd Edition (ADOS-2), behavioral observations gathered from the testing session and interview with Mr. Melvin, as well as his spouse's qualitative report report of adaptive behavior and social behavior scales, indicated that Mr. Melvin meets criteria for Autism Spectrum Disorder.         Recommendations:  Discussed the following with Mr. Melvin: ASD work accommodations, psychotherapy, and medication management to support him with difficulties associated with " ASD.     Diagnostic Impression:  Based on the testing completed and background information provided, the current diagnostic impression is:       ICD-10-CM ICD-9-CM   1. Autism spectrum disorder without accompanying intellectual impairment, requiring support (level 1)  F84.0 299.00                          _____________________________________________________________________________  Nathaniel Garces, Ph.D.  Licensed Psychologist #[id number]  [facility]  Department of Psychiatry  [address] [place] [address] Appr.  [place] [address]  Phone: [phone], ext. [phone]

## 2025-07-28 PROBLEM — F84.0 AUTISM SPECTRUM DISORDER: Status: ACTIVE | Noted: 2025-07-28

## 2025-08-13 ENCOUNTER — OFFICE VISIT (OUTPATIENT)
Dept: PSYCHIATRY | Facility: CLINIC | Age: 40
End: 2025-08-13
Payer: COMMERCIAL

## 2025-08-13 VITALS
HEIGHT: 68 IN | DIASTOLIC BLOOD PRESSURE: 88 MMHG | BODY MASS INDEX: 23.45 KG/M2 | WEIGHT: 154.75 LBS | SYSTOLIC BLOOD PRESSURE: 127 MMHG | HEART RATE: 70 BPM

## 2025-08-13 DIAGNOSIS — F32.5 MAJOR DEPRESSIVE DISORDER WITH SINGLE EPISODE, IN FULL REMISSION: ICD-10-CM

## 2025-08-13 DIAGNOSIS — F84.0 AUTISM SPECTRUM DISORDER REQUIRING SUPPORT (LEVEL 1): ICD-10-CM

## 2025-08-13 DIAGNOSIS — F90.0 ADHD (ATTENTION DEFICIT HYPERACTIVITY DISORDER), INATTENTIVE TYPE: Primary | ICD-10-CM

## 2025-08-13 DIAGNOSIS — F41.1 GENERALIZED ANXIETY DISORDER WITH PANIC ATTACKS: ICD-10-CM

## 2025-08-13 DIAGNOSIS — F95.1 CHRONIC MOTOR TIC DISORDER: ICD-10-CM

## 2025-08-13 DIAGNOSIS — F40.10 SOCIAL ANXIETY DISORDER: ICD-10-CM

## 2025-08-13 DIAGNOSIS — F41.0 GENERALIZED ANXIETY DISORDER WITH PANIC ATTACKS: ICD-10-CM

## 2025-08-13 PROCEDURE — 99214 OFFICE O/P EST MOD 30 MIN: CPT | Mod: S$GLB,,, | Performed by: STUDENT IN AN ORGANIZED HEALTH CARE EDUCATION/TRAINING PROGRAM

## 2025-08-13 PROCEDURE — 99999 PR PBB SHADOW E&M-EST. PATIENT-LVL III: CPT | Mod: PBBFAC,,, | Performed by: STUDENT IN AN ORGANIZED HEALTH CARE EDUCATION/TRAINING PROGRAM

## 2025-08-13 PROCEDURE — 3079F DIAST BP 80-89 MM HG: CPT | Mod: CPTII,S$GLB,, | Performed by: STUDENT IN AN ORGANIZED HEALTH CARE EDUCATION/TRAINING PROGRAM

## 2025-08-13 PROCEDURE — 3008F BODY MASS INDEX DOCD: CPT | Mod: CPTII,S$GLB,, | Performed by: STUDENT IN AN ORGANIZED HEALTH CARE EDUCATION/TRAINING PROGRAM

## 2025-08-13 PROCEDURE — 1159F MED LIST DOCD IN RCRD: CPT | Mod: CPTII,S$GLB,, | Performed by: STUDENT IN AN ORGANIZED HEALTH CARE EDUCATION/TRAINING PROGRAM

## 2025-08-13 PROCEDURE — G2211 COMPLEX E/M VISIT ADD ON: HCPCS | Mod: S$GLB,,, | Performed by: STUDENT IN AN ORGANIZED HEALTH CARE EDUCATION/TRAINING PROGRAM

## 2025-08-13 PROCEDURE — 1160F RVW MEDS BY RX/DR IN RCRD: CPT | Mod: CPTII,S$GLB,, | Performed by: STUDENT IN AN ORGANIZED HEALTH CARE EDUCATION/TRAINING PROGRAM

## 2025-08-13 PROCEDURE — 3074F SYST BP LT 130 MM HG: CPT | Mod: CPTII,S$GLB,, | Performed by: STUDENT IN AN ORGANIZED HEALTH CARE EDUCATION/TRAINING PROGRAM

## 2025-08-13 PROCEDURE — 96136 PSYCL/NRPSYC TST PHY/QHP 1ST: CPT | Mod: 59,S$GLB,, | Performed by: STUDENT IN AN ORGANIZED HEALTH CARE EDUCATION/TRAINING PROGRAM

## 2025-08-13 RX ORDER — LISDEXAMFETAMINE DIMESYLATE 40 MG/1
40 CAPSULE ORAL EVERY MORNING
Qty: 30 CAPSULE | Refills: 0 | Status: SHIPPED | OUTPATIENT
Start: 2025-09-10

## 2025-08-13 RX ORDER — LISDEXAMFETAMINE DIMESYLATE 40 MG/1
40 CAPSULE ORAL EVERY MORNING
Qty: 30 CAPSULE | Refills: 0 | Status: SHIPPED | OUTPATIENT
Start: 2025-08-13

## 2025-08-13 RX ORDER — SERTRALINE HYDROCHLORIDE 25 MG/1
75 TABLET, FILM COATED ORAL DAILY
Qty: 90 TABLET | Refills: 2 | Status: SHIPPED | OUTPATIENT
Start: 2025-08-13

## 2025-08-13 RX ORDER — PROPRANOLOL HYDROCHLORIDE 20 MG/1
20 TABLET ORAL 2 TIMES DAILY
Qty: 60 TABLET | Refills: 2 | Status: SHIPPED | OUTPATIENT
Start: 2025-08-13

## (undated) DEVICE — FIBER QUANTA OPT SDT 272UM

## (undated) DEVICE — BOWL STERILE LARGE 32OZ

## (undated) DEVICE — LEGGING CLEAR POLY 2/PACK

## (undated) DEVICE — SCRUB DYNA-HEX LIQ 4% CHG 4OZ

## (undated) DEVICE — CATH URETERAL DUAL LUMEN 10FR

## (undated) DEVICE — EXTRACTOR STONE 4WR NIT 2.2F 1

## (undated) DEVICE — SPONGE BULKEE II ABSRB 6X6.75

## (undated) DEVICE — SOL NACL IRR 1000ML BTL

## (undated) DEVICE — SLEEVE SCD EXPRESS KNEE MEDIUM

## (undated) DEVICE — JELLY SURGILUBE LUBE TUBE 2OZ

## (undated) DEVICE — SOL NACL IRR 3000ML

## (undated) DEVICE — SYR ONLY LUER LOCK 20CC

## (undated) DEVICE — COVER TABLE 44X90 STERILE

## (undated) DEVICE — URETERO-RENOSCOPE FLEX-X POS

## (undated) DEVICE — CATH URTRL OPEN END STR TIP 5F

## (undated) DEVICE — DRAPE CYSTOSCOPY LARGE

## (undated) DEVICE — GLOVE SENSICARE PI ALOE 6.5

## (undated) DEVICE — CONTAINER SPECIMEN OR STER 4OZ

## (undated) DEVICE — GOWN POLY REINF BRTH SLV LG

## (undated) DEVICE — BAG LINGEMAN DRAIN UROLOGY

## (undated) DEVICE — SHEATH FLEXOR URETERAL 45CM

## (undated) DEVICE — GUIDE WIRE MOTION .035 X 150CM

## (undated) DEVICE — SET IRR URLGY 2LINE UNIV SPIKE